# Patient Record
Sex: FEMALE | Race: WHITE | NOT HISPANIC OR LATINO | Employment: FULL TIME | URBAN - METROPOLITAN AREA
[De-identification: names, ages, dates, MRNs, and addresses within clinical notes are randomized per-mention and may not be internally consistent; named-entity substitution may affect disease eponyms.]

---

## 2017-06-03 ENCOUNTER — OFFICE VISIT (OUTPATIENT)
Dept: URGENT CARE | Facility: URGENT CARE | Age: 32
End: 2017-06-03
Payer: COMMERCIAL

## 2017-06-03 VITALS
SYSTOLIC BLOOD PRESSURE: 158 MMHG | DIASTOLIC BLOOD PRESSURE: 103 MMHG | HEART RATE: 69 BPM | WEIGHT: 268 LBS | OXYGEN SATURATION: 96 % | TEMPERATURE: 97.4 F | BODY MASS INDEX: 42.61 KG/M2

## 2017-06-03 DIAGNOSIS — M79.605 LOW BACK PAIN RADIATING TO BOTH LEGS: Primary | ICD-10-CM

## 2017-06-03 DIAGNOSIS — M62.830 BACK MUSCLE SPASM: ICD-10-CM

## 2017-06-03 DIAGNOSIS — M79.604 LOW BACK PAIN RADIATING TO BOTH LEGS: Primary | ICD-10-CM

## 2017-06-03 DIAGNOSIS — M54.50 LOW BACK PAIN RADIATING TO BOTH LEGS: Primary | ICD-10-CM

## 2017-06-03 PROCEDURE — 99213 OFFICE O/P EST LOW 20 MIN: CPT | Performed by: NURSE PRACTITIONER

## 2017-06-03 RX ORDER — CYCLOBENZAPRINE HCL 10 MG
5-10 TABLET ORAL 3 TIMES DAILY PRN
Qty: 30 TABLET | Refills: 0 | Status: SHIPPED | OUTPATIENT
Start: 2017-06-03 | End: 2017-06-06

## 2017-06-03 RX ORDER — HYDROCODONE BITARTRATE AND ACETAMINOPHEN 5; 325 MG/1; MG/1
1 TABLET ORAL EVERY 6 HOURS PRN
Qty: 12 TABLET | Refills: 0 | Status: SHIPPED | OUTPATIENT
Start: 2017-06-03 | End: 2017-06-06

## 2017-06-03 NOTE — MR AVS SNAPSHOT
After Visit Summary   6/3/2017    Malika So    MRN: [id number]           Patient Information     Date Of Birth          1985        Visit Information        Provider Department      6/3/2017 10:00 AM Leana Dent NP [facility]        Today's Diagnoses     Low back pain radiating to both legs    -  1    Back muscle spasm          Care Instructions      Back Care Tips    Caring for your back  These are things you can do to prevent a recurrence of acute back pain and to reduce symptoms from chronic back pain:    Maintain a healthy weight. If you are overweight, losing weight will help most types of back pain.    Exercise is an important part of recovery from most types of back pain. The back is supported by the muscles behind and in front of the spine. This means both the back muscles and the abdominal muscles must be strengthened to provide better support for your spine.     Swimming and brisk walking are good overall exercises to improve your fitness level.    Practice safe lifting methods (below).    Practice good posture when sitting, standing and walking. Avoid prolonged sitting. This puts more stress on the lower back than standing or walking.    Wear quality shoes with sufficient arch support. Foot and ankle alignment can affect back symptoms. Women should avoid high heels.    Therapeutic massage can help  relax the back muscles without stretching them.    During the first 24 to 72 hours after an acute injury or flare-up of chronic back pain, apply an ice pack to the painful area for 20 minutes and then remove it for 20 minutes over a period of 60 to 90 minutes or several times a day. As a safety precaution, do not use a heating pad at bedtime. Sleeping on a heating pad can lead to skin burns or tissue damage.    Ice and heat therapies can be alternated.  Medications  Talk to your doctor before using medications, especially if you have other medical problems  or are taking other medicines.    You may use acetaminophen or ibuprofen to control pain, unless other pain medicine was prescribed. If you have chronic conditions like diabetes, liver or kidney disease, stomach ulcers or gastrointestinal bleeding, or are taking blood thinners, talk with your doctor before taking any meidcations.    Be careful if you are given prescription pain medicines, narcotics, or medication for muscle spasm. They can cause drowsiness, affect your coordination, reflexes and judgment. Do not drive or operate heavy machinery.  Lumbar stretch  Here is a simple stretching exercise that will help relax muscle spasm and keep your back more limber. If exercise makes your back pain worse, don t do it.    Lie on your back with your knees bent and both feet on the ground.    Slowly raise your left knee to your chest as you flatten your lower back against the floor. Hold for 5 seconds.    Relax and repeat the exercise with your right knee.    Do 10 of these exercises for each leg.  Safe lifting method    Don t bend over at the waist to lift an object off the floor.  Instead, bend your knees and hips in a squat.     Keep your back and head upright    Hold the object close to your body, directly in front of you.    Straighten your legs to lift the object.     Lower the object to the floor in the reverse fashion.    If you must slide something across the floor, push it.  Posture tips  Sitting  Sit in chairs with straight backs or low-back support. rKeep your k nees lower than your hip, with your feet flat on the floor.  When driving, sit up straight. Adjust the seat forward so you are not leaning toward the steering wheel.  A small pillow or rolled towel behind your lower back may help if you are driving long distances.   Standing  When standing for long periods, shift most of your weight to one leg at a time. Alternate legs every few minutes.   Sleeping  The best way to sleep is on your side with your knees  bent. Put a low pillow under your head to support your neck in a neutral spine position. Avoid thick pillows that bend your neck to one side. Put a pillow between your legs to further relax your lower back. If you sleep on your back, put pillows under your knees to support your legs in a slightly flexed position. Use a firm mattress. If your mattress sags, replace it, or use a 1/2-inch plywood board under the mattress to add support.  Follow-up care  Follow up with your health care provider or as directed by our staff.  If X-rays, a CT scan or an MRI scan were taken, they will be reviewed by a radiologist. You will be notified of any new findings that may affect your care.  Call 911  Seek emergency medical care if any of the following occur:    Trouble breathing    Confusion    Very drowsy or trouble breathing    Fainting or loss of consciousness    Rapid or very slow heart rate    Loss of  bwel or bladder control  When to seek medical care  Call your health care provider if any of the following occur:    Pain becomes worse or spreads to your arms or legs    Weakness or numbness in one or both arms or legs    Numbness in the groin area    1728-0785 [employer]. [address] [place] [address]. All rights reserved. This information is not intended as a substitute for professional medical care. Always follow your healthcare professional's instructions.                Follow-ups after your visit        Who to contact     If you have questions or need follow up information about today's clinic visit or your schedule please contact [facility] directly at [phone].  Normal or non-critical lab and imaging results will be communicated to you by MyChart, letter or phone within 4 business days after the clinic has received the results. If you do not hear from us within 7 days, please contact the clinic through MyChart or phone. If you have a critical or abnormal lab result, we  will notify you by phone as soon as possible.  Submit refill requests through [employer] or call your pharmacy and they will forward the refill request to us. Please allow 3 business days for your refill to be completed.          Additional Information About Your Visit        [employer] Information     [employer] gives you secure access to your electronic health record. If you see a primary care provider, you can also send messages to your care team and make appointments. If you have questions, please call your primary care clinic.  If you do not have a primary care provider, please call [phone] and they will assist you.        Care EveryWhere ID     This is your Care EveryWhere ID. This could be used by other organizations to access your [place] medical records  [id number]        Your Vitals Were     Pulse Temperature Pulse Oximetry BMI (Body Mass Index)          69 97.4  F (36.3  C) (Oral) 96% 42.61 kg/m2         Blood Pressure from Last 3 Encounters:   06/03/17 (!) 158/103   05/21/16 118/82   05/18/16 124/75    Weight from Last 3 Encounters:   06/03/17 268 lb (121.6 kg)   05/21/16 233 lb (105.7 kg)   05/18/16 233 lb 9.6 oz (106 kg)              Today, you had the following     No orders found for display         Today's Medication Changes          These changes are accurate as of: 6/3/17 11:18 AM.  If you have any questions, ask your nurse or doctor.               Start taking these medicines.        Dose/Directions    cyclobenzaprine 10 MG tablet   Commonly known as:  FLEXERIL   Used for:  Back muscle spasm   Started by:  Leana Dent NP        Dose:  5-10 mg   Take 0.5-1 tablets (5-10 mg) by mouth 3 times daily as needed for muscle spasms   Quantity:  30 tablet   Refills:  0       HYDROcodone-acetaminophen 5-325 MG per tablet   Commonly known as:  NORCO   Used for:  Low back pain radiating to both legs   Started by:  Leana Dent NP        Dose:  1 tablet   Take 1 tablet by mouth every 6 hours as needed for  moderate to severe pain or pain maximum 4 tablet(s) per day   Quantity:  12 tablet   Refills:  0            Where to get your medicines      These [place] [facility] [place] [address]  [address], [facility] [address]     Phone:  [phone]     cyclobenzaprine 10 MG tablet         Some of these will need a paper prescription and others can be bought over the counter.  Ask your nurse if you have questions.     Bring a paper prescription for each of these medications     HYDROcodone-acetaminophen 5-325 MG per tablet                Primary Care Provider Office Phone # Fax #    Kelli Osullivan [phone]       XXX RESIGNED  [address]  [facility] [address]        Thank you!     Thank you for choosing [facility]  for your care. Our goal is always to provide you with excellent care. Hearing back from our patients is one way we can continue to improve our services. Please take a few minutes to complete the written survey that you may receive in the mail after your visit with us. Thank you!             Your Updated Medication List - Protect others around you: Learn how to safely use, store and throw away your medicines at www.disposemymeds.org.          This list is accurate as of: 6/3/17 11:18 AM.  Always use your most recent med list.                   Brand Name Dispense Instructions for use    clonazePAM 1 MG tablet    klonoPIN    30 tablet    Take 1 tablet up to 2 times daily as needed for anxiety - use sparingly       cyclobenzaprine 10 MG tablet    FLEXERIL    30 tablet    Take 0.5-1 tablets (5-10 mg) by mouth 3 times daily as needed for muscle spasms       HYDROcodone-acetaminophen 5-325 MG per tablet    NORCO    12 tablet    Take 1 tablet by mouth every 6 hours as needed for moderate to severe pain or pain maximum 4 tablet(s) per day       levonorgestrel 20 MCG/24HR IUD    MIRENA     1 each by  Intrauterine route once       sertraline 100 MG tablet    ZOLOFT    180 tablet    Take 2 tablets (200 mg) by mouth daily

## 2017-06-03 NOTE — PATIENT INSTRUCTIONS
Back Care Tips    Caring for your back  These are things you can do to prevent a recurrence of acute back pain and to reduce symptoms from chronic back pain:    Maintain a healthy weight. If you are overweight, losing weight will help most types of back pain.    Exercise is an important part of recovery from most types of back pain. The back is supported by the muscles behind and in front of the spine. This means both the back muscles and the abdominal muscles must be strengthened to provide better support for your spine.     Swimming and brisk walking are good overall exercises to improve your fitness level.    Practice safe lifting methods (below).    Practice good posture when sitting, standing and walking. Avoid prolonged sitting. This puts more stress on the lower back than standing or walking.    Wear quality shoes with sufficient arch support. Foot and ankle alignment can affect back symptoms. Women should avoid high heels.    Therapeutic massage can help  relax the back muscles without stretching them.    During the first 24 to 72 hours after an acute injury or flare-up of chronic back pain, apply an ice pack to the painful area for 20 minutes and then remove it for 20 minutes over a period of 60 to 90 minutes or several times a day. As a safety precaution, do not use a heating pad at bedtime. Sleeping on a heating pad can lead to skin burns or tissue damage.    Ice and heat therapies can be alternated.  Medications  Talk to your doctor before using medications, especially if you have other medical problems or are taking other medicines.    You may use acetaminophen or ibuprofen to control pain, unless other pain medicine was prescribed. If you have chronic conditions like diabetes, liver or kidney disease, stomach ulcers or gastrointestinal bleeding, or are taking blood thinners, talk with your doctor before taking any meidcations.    Be careful if you are given prescription pain medicines, narcotics, or  medication for muscle spasm. They can cause drowsiness, affect your coordination, reflexes and judgment. Do not drive or operate heavy machinery.  Lumbar stretch  Here is a simple stretching exercise that will help relax muscle spasm and keep your back more limber. If exercise makes your back pain worse, don t do it.    Lie on your back with your knees bent and both feet on the ground.    Slowly raise your left knee to your chest as you flatten your lower back against the floor. Hold for 5 seconds.    Relax and repeat the exercise with your right knee.    Do 10 of these exercises for each leg.  Safe lifting method    Don t bend over at the waist to lift an object off the floor.  Instead, bend your knees and hips in a squat.     Keep your back and head upright    Hold the object close to your body, directly in front of you.    Straighten your legs to lift the object.     Lower the object to the floor in the reverse fashion.    If you must slide something across the floor, push it.  Posture tips  Sitting  Sit in chairs with straight backs or low-back support. rKeep your k nees lower than your hip, with your feet flat on the floor.  When driving, sit up straight. Adjust the seat forward so you are not leaning toward the steering wheel.  A small pillow or rolled towel behind your lower back may help if you are driving long distances.   Standing  When standing for long periods, shift most of your weight to one leg at a time. Alternate legs every few minutes.   Sleeping  The best way to sleep is on your side with your knees bent. Put a low pillow under your head to support your neck in a neutral spine position. Avoid thick pillows that bend your neck to one side. Put a pillow between your legs to further relax your lower back. If you sleep on your back, put pillows under your knees to support your legs in a slightly flexed position. Use a firm mattress. If your mattress sags, replace it, or use a 1/2-inch plywood board  under the mattress to add support.  Follow-up care  Follow up with your health care provider or as directed by our staff.  If X-rays, a CT scan or an MRI scan were taken, they will be reviewed by a radiologist. You will be notified of any new findings that may affect your care.  Call 911  Seek emergency medical care if any of the following occur:    Trouble breathing    Confusion    Very drowsy or trouble breathing    Fainting or loss of consciousness    Rapid or very slow heart rate    Loss of  bwel or bladder control  When to seek medical care  Call your health care provider if any of the following occur:    Pain becomes worse or spreads to your arms or legs    Weakness or numbness in one or both arms or legs    Numbness in the groin area    [id number] [employer]. [address] [place] [address], [place] [address]. All rights reserved. This information is not intended as a substitute for professional medical care. Always follow your healthcare professional's instructions.

## 2017-06-03 NOTE — PROGRESS NOTES
SUBJECTIVE:                                                    Malika So is a 31 year old female who presents to clinic today for the following health issues:      Back Pain      Duration: yesterday        Specific cause: lifting    Description:   Location of pain: low back bilateral  Character of pain: sharp, stabbing, constant and intermittent  Pain radiation:none and radiates to both thighs and makes legs numb  New numbness or weakness in legs, not attributed to pain:  YES    Intensity: Currently 6/10    History:   Pain interferes with job: YES,   History of back problems: previous herniated disc  Any previous MRI or X-rays: Yes- at [place].  Date last year  Sees a specialist for back pain:  No  Therapies tried without relief:  Physical Therapy    Alleviating factors:   Improved by: chiropractor and massage      Precipitating factors:  Worsened by: Lifting, Bending, Sitting and Lying Flat    Functional and Psychosocial Screen (José Miguel STarT Back):      Not performed today       Accompanying Signs & Symptoms:  Risk of Fracture:  None  Risk of Cauda Equina:  None  Risk of Infection:  None  Risk of Cancer:  None  Risk of Ankylosing Spondylitis:  Onset at age <35, male, AND morning back stiffness. no                      Allergies   Allergen Reactions     Adhesive Tape      Fenugreek [Trigonella Foenum-Graecum] Hives     Ragweeds Hives       Past Medical History:   Diagnosis Date     Anxiety      Hypertension     prior to weight loss     Mild major depression (H)     celexa 100 mg      TB (pulmonary tuberculosis) 2003    neg chest x-ray. treated with INH for 9 moniths         Current Outpatient Prescriptions on File Prior to Visit:  sertraline (ZOLOFT) 100 MG tablet Take 2 tablets (200 mg) by mouth daily   levonorgestrel (MIRENA) 20 MCG/24HR IUD 1 each by Intrauterine route once   clonazePAM (KLONOPIN) 1 MG tablet Take 1 tablet up to 2 times daily as needed for anxiety - use sparingly (Patient not  taking: Reported on 6/3/2017)     No current facility-administered medications on file prior to visit.     Past Surgical History:   Procedure Laterality Date      SECTION N/A 2016    Procedure:  SECTION;  Surgeon: Kelli Osullivan MD;  Location:  [facility]     CHOLECYSTECTOMY, LAPOROSCOPIC       LAP ADJUSTABLE GASTRIC BAND      [facility] lost over 150 lb      LASIK  2012     TONSILLECTOMY  age 10       Social History     Social History     Marital status:      Spouse name: N/A     Number of children: N/A     Years of education: N/A     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[employer]     Social History Main Topics     Smoking status: Never Smoker     Smokeless tobacco: Never Used     Alcohol use No     Drug use: No     Sexual activity: Yes     Partners: Male     Birth control/ protection: Inserts      Comment: nuvaring     Other Topics Concern      Service No     Blood Transfusions No     Caffeine Concern No     Occupational Exposure Yes     Hobby Hazards No     Sleep Concern Yes     takes trazadone for sleep     Stress Concern Yes     Weight Concern Yes     Back Care No     Exercise Yes     3 times per week     Bike Helmet Yes     Seat Belt Yes     Self-Exams Yes     Parent/Sibling W/ Cabg, Mi Or Angioplasty Before 65f 55m? No     Social History Narrative    Caffeine intake/servings daily - 0    Calcium intake/servings daily - 3    Exercise 5 times weekly - describe ; walks    Sunscreen used - Yes    Seatbelts used - Yes    Guns stored in the home - No    Self Breast Exam - Yes    Pap test up to date -  Yes    Eye exam up to date -  Yes    Dental exam up to date -  Yes    DEXA scan up to date -  No    Flex Sig/Colonoscopy up to date -  No    Mammography up to date -  No    Immunizations reviewed and up to date - Yes    Abuse: Current or Past (Physical, Sexual or Emotional) - No    Do you feel safe in your environment - Yes    Do you cope well with stress - No    Do you suffer from  insomnia - No    Last updated by: Cindy James  6/4/2015                   REVIEW OF SYSTEMS  General: negative for fever  Resp: negative for chest pain   CV: negative for chest pain  : negative for dysuria , incontinence, frequency  Musculoskeletal: as above  Neurologic: negative for ataxia, saddle anesthesia, fecal incontinence, one sided weakness,  paresthesias    Physical Exam:  Vitals: BP (!) 158/103 (BP Location: Left arm, Patient Position: Other (Comments), Cuff Size: Adult Regular)  Pulse 69  Temp 97.4  F (36.3  C) (Oral)  Wt 268 lb (121.6 kg)  SpO2 96%  BMI 42.61 kg/m2  BMI= Body mass index is 42.61 kg/(m^2).  Constitutional: healthy, alert and no acute distress   CARDIO: RRR, no MRG  RESP: lungs CTA, NAD  NEURO: Patellar reflexes intact and equal b/l  BACK:  Unable to do straight leg raise due to pain. tender  paraspinal muscle TTP, strength, sensation and pulses  intact and equal b/l lower extremities  GAIT: intact  Psychiatric: mentation appears normal and affect normal/bright      Impression: Back pain, uncomplicated.     ICD-10-CM    1. Low back pain radiating to both legs M54.5 HYDROcodone-acetaminophen (NORCO) 5-325 MG per tablet   2. Back muscle spasm M62.830 cyclobenzaprine (FLEXERIL) 10 MG tablet       Plan:  Advised follow up with primary care, she is worried of worsening the disc bulge.  Instructions for back care and return precautions discussed.        Leana Dent  FNP-BC  Family Nurse Practitoner

## 2017-06-03 NOTE — NURSING NOTE
"Chief Complaint   Patient presents with     Back Pain     yesterday but hx of back issues       Initial BP (!) 158/103 (BP Location: Left arm, Patient Position: Other (Comments), Cuff Size: Adult Regular)  Pulse 69  Temp 97.4  F (36.3  C) (Oral)  Wt 268 lb (121.6 kg)  SpO2 96%  BMI 42.61 kg/m2 Estimated body mass index is 42.61 kg/(m^2) as calculated from the following:    Height as of 5/21/16: 5' 6.5\" (1.689 m).    Weight as of this encounter: 268 lb (121.6 kg).  Medication Reconciliation: complete    "

## 2017-06-05 NOTE — PROGRESS NOTES
SUBJECTIVE:     CC: Malika So is an 31 year old morbidly obese woman  who presents for preventive health visit.     She requests refills of medications for depression and anxiety. She also presents for follow-up of acute recurrent low back pain.     Healthy Habits:    Do you get at least three servings of calcium containing foods daily (dairy, green leafy vegetables, etc.)? yes    Amount of exercise or daily activities, outside of work: no    Problems taking medications regularly No    Medication side effects: No    Have you had an eye exam in the past two years? no    Do you see a dentist twice per year? no    Do you have sleep apnea, excessive snoring or daytime drowsiness?no      Today's PHQ-2 Score:   PHQ-2 (  [employer]) 2016   Q1: Little interest or pleasure in doing things 2 0   Q2: Feeling down, depressed or hopeless 2 0   PHQ-2 Score 4 0       Abuse: Current or Past(Physical, Sexual or Emotional)- No  Do you feel safe in your environment - Yes    Social History   Substance Use Topics     Smoking status: Never Smoker     Smokeless tobacco: Never Used     Alcohol use No     The patient does not drink >3 drinks per day nor >7 drinks per week.    Recent Labs   Lab Test  09   1308  09   1434   CHOL  184  120   HDL  40*  35*   LDL  127  71   TRIG  89  73   CHOLHDLRATIO  4.7  3.5       Reviewed orders with patient.  Reviewed health maintenance and updated orders accordingly - Yes    Mammo Decision Support:  Mammogram not appropriate for this patient based on age.    Pertinent mammograms are reviewed under the imaging tab.  History of abnormal Pap smear: NO - age 30-65 PAP every 5 years with negative HPV co-testing recommended    Reviewed and updated as needed this visit by clinical staff  Tobacco  Allergies  Meds  Problems  Med Hx  Surg Hx  Fam Hx  Soc Hx          Reviewed and updated as needed this visit by Provider  Tobacco  Allergies  Meds  Problems   "Med Hx  Surg Hx  Fam Hx  Soc Hx         Past Medical History:   Diagnosis Date     Anxiety      GERD without esophagitis      Hypertension     prior to weight loss     Latent tuberculosis     neg chest x-ray. treated with INH for 9 moniths     Mild major depression (H)     celexa 100 mg      Morbid obesity (H)       Past Surgical History:   Procedure Laterality Date      SECTION N/A 2016    Procedure:  SECTION;  Surgeon: Kelli Osullivan MD;  Location:  L+D     CHOLECYSTECTOMY, LAPOROSCOPIC       LAP ADJUSTABLE GASTRIC BAND      [facility] lost over 150 lb      LASIK       TONSILLECTOMY         ROS:  CONSTITUTIONAL:POSITIVE  for weight gain  I: NEGATIVE for worrisome rashes, moles or lesions  E: NEGATIVE for vision changes or irritation  ENT: NEGATIVE for ear, mouth and throat problems  R: NEGATIVE for significant cough or SOB  B: NEGATIVE for masses, tenderness or discharge  CV: NEGATIVE for chest pain, palpitations or peripheral edema  GI: GERD; NEGATIVE for nausea, abdominal pain, or change in bowel habits   female: menses: persistent break through bleeding on Mirena IUD   MUSCULOSKELETAL: recurrent low back pain   N: NEGATIVE for weakness, dizziness or paresthesias  E: NEGATIVE for temperature intolerance, skin/hair changes  H: NEGATIVE for bleeding problems  P: NEGATIVE for changes in mood or affect    Problem list, Medication list, Allergies, and Medical/Social/Surgical histories reviewed in [facility] and updated as appropriate.  OBJECTIVE:     /80  Pulse 93  Temp 98.6  F (37  C)  Resp 16  Ht 5' 10\" (1.778 m)  Wt 275 lb (124.7 kg)  SpO2 100%  BMI 39.46 kg/m2  EXAM:  GENERAL: alert, no distress and obese  EYES: Eyes grossly normal to inspection, PERRL and conjunctivae and sclerae normal  HENT: ear canals and TM's normal, nose and mouth without ulcers or lesions  NECK: no adenopathy, no asymmetry, masses, or scars and thyroid normal to palpation  RESP: lungs " clear to auscultation - no rales, rhonchi or wheezes  BREAST: normal without masses, tenderness or nipple discharge and no palpable axillary masses or adenopathy  CV: regular rate and rhythm, normal S1 S2, no S3 or S4, no murmur, click or rub, no peripheral edema and peripheral pulses strong  ABDOMEN: soft, nontender, no hepatosplenomegaly, no masses and bowel sounds normal  MS: no gross musculoskeletal defects noted, no edema  SKIN: no suspicious lesions or rashes  NEURO: Normal strength and tone, mentation intact and speech normal  PSYCH: mentation appears normal, affect normal/bright    ASSESSMENT/PLAN:     (Z00.00) Routine history and physical examination of adult  (primary encounter diagnosis)    (F33.0) Major depressive disorder, recurrent episode, mild (H)  (F41.9) Anxiety  Comment: Well controlled with medications without side effects.   Plan: sertraline (ZOLOFT) 100 MG tablet, clonazePAM         (KLONOPIN) 1 MG tablet        Discussed risks and benefits of this medication. No further refills until follow-up appointment     (E66.01) Morbid obesity due to excess calories (H)  Plan: Lipid panel reflex to direct LDL, TSH with free        T4 reflex, BARIATRIC ADULT REFERRAL          (M54.5) Non-specific low back pain  Plan: HYDROcodone-acetaminophen (NORCO) 5-325 MG per         tablet, cyclobenzaprine (FLEXERIL) 10 MG tablet        Discussed risks and benefits of this medication. No further refills until follow-up appointment. Over the counter pain medication as needed, ice or heat as she finds helpful, avoidance of aggravating activities, and return for persistence for consideration of further imaging or offered physical therapy referral.     (K21.9) GERD without esophagitis  Plan: continue over-the-counter medication as needed     (Z30.9) Encounter for contraceptive management, unspecified contraceptive encounter type  Plan: OB/GYN REFERRAL            COUNSELING:   Reviewed preventive health counseling, as  "reflected in patient instructions  Special attention given to:        Regular exercise       Healthy diet/nutrition       Contraception       Osteoporosis Prevention/Bone Health       The ASCVD Risk score ([place] Jr, et al., 2013) failed to calculate for the following reasons:    The 2013 ASCVD risk score is only valid for ages 40 to 79    BP Screening:   Last 3 BP Readings:    BP Readings from Last 3 Encounters:   06/06/17 136/80   06/03/17 (!) 158/103   05/21/16 118/82       The following was recommended to the patient:  Re-screen BP within a year and recommended lifestyle modifications     reports that she has never smoked. She has never used smokeless tobacco.    Estimated body mass index is 39.46 kg/(m^2) as calculated from the following:    Height as of this encounter: 5' 10\" (1.778 m).    Weight as of this encounter: 275 lb (124.7 kg).   Weight management plan: Patient referred to endocrine and/or weight management specialty    Counseling Resources:  ATP IV Guidelines  Pooled Cohorts Equation Calculator  Breast Cancer Risk Calculator  FRAX Risk Assessment  ICSI Preventive Guidelines  Dietary Guidelines for Americans, 2010  USDA's MyPlate  ASA Prophylaxis  Lung CA Screening    Josefina Welch MD  [facility]  "

## 2017-06-05 NOTE — PATIENT INSTRUCTIONS
Preventive Health Recommendations  Female Ages 26 - 39  Yearly exam:   See your health care provider every year in order to    Review health changes.     Discuss preventive care.      Review your medicines if you your doctor has prescribed any.    Until age 30: Get a Pap test every three years (more often if you have had an abnormal result).    After age 30: Talk to your doctor about whether you should have a Pap test every 3 years or have a Pap test with HPV screening every 5 years.   You do not need a Pap test if your uterus was removed (hysterectomy) and you have not had cancer.  You should be tested each year for STDs (sexually transmitted diseases), if you're at risk.   Talk to your provider about how often to have your cholesterol checked.  If you are at risk for diabetes, you should have a diabetes test (fasting glucose).  Shots: Get a flu shot each year. Get a tetanus shot every 10 years.   Nutrition:     Eat at least 5 servings of fruits and vegetables each day.    Eat whole-grain bread, whole-wheat pasta and brown rice instead of white grains and rice.    Talk to your provider about Calcium and Vitamin D.     Lifestyle    Exercise at least 150 minutes a week (30 minutes a day, 5 days of the week). This will help you control your weight and prevent disease.    Limit alcohol to one drink per day.    No smoking.     Wear sunscreen to prevent skin cancer.    See your dentist every six months for an exam and cleaning.    [facility]    If you have any questions regarding to your visit please contact your care team:       Team Red:   Clinic Hours Telephone Number   Dr. Josefina Steel NP   7am-7pm  Monday - Thursday   7am-5pm  Fridays  [phone]  (Appointment scheduling available 24/7)    Questions about your visit?   Team Line  [phone]   Urgent Care - [place] - 11am-9pm Monday-Friday Saturday-Sunday-  9am-5pm   [place] - 5pm-9pm Monday-Friday Saturday-Sunday- 9am-5pm  [phone] - [place]   [phone] - [place]       What options do I have for visits at the clinic other than the traditional office visit?  To expand how we care for you, many of our providers are utilizing electronic visits (e-visits) and telephone visits, when medically appropriate, for interactions with their patients rather than a visit in the clinic.   We also offer nurse visits for many medical concerns. Just like any other service, we will bill your insurance company for this type of visit based on time spent on the phone with your provider. Not all insurance companies cover these visits. Please check with your medical insurance if this type of visit is covered. You will be responsible for any charges that are not paid by your insurance.      E-visits via [employer]:  generally incur a $35.00 fee.  Telephone visits:  Time spent on the phone: *charged based on time that is spent on the phone in increments of 10 minutes. Estimated cost:   5-10 mins $30.00   11-20 mins. $59.00   21-30 mins. $85.00     Use [employer] (secure email communication and access to your chart) to send your primary care provider a message or make an appointment. Ask someone on your Team how to sign up for [employer].  For a Price Quote for your services, please call our Consumer Price Line at [phone].      As always, Thank you for trusting us with your health care needs!    Discharged by Saritha Avalos MA.

## 2017-06-06 ENCOUNTER — OFFICE VISIT (OUTPATIENT)
Dept: FAMILY MEDICINE | Facility: CLINIC | Age: 32
End: 2017-06-06
Payer: COMMERCIAL

## 2017-06-06 VITALS
SYSTOLIC BLOOD PRESSURE: 136 MMHG | DIASTOLIC BLOOD PRESSURE: 80 MMHG | HEIGHT: 70 IN | WEIGHT: 275 LBS | HEART RATE: 93 BPM | TEMPERATURE: 98.6 F | BODY MASS INDEX: 39.37 KG/M2 | RESPIRATION RATE: 16 BRPM | OXYGEN SATURATION: 100 %

## 2017-06-06 DIAGNOSIS — F33.0 MAJOR DEPRESSIVE DISORDER, RECURRENT EPISODE, MILD (H): ICD-10-CM

## 2017-06-06 DIAGNOSIS — Z00.00 ROUTINE HISTORY AND PHYSICAL EXAMINATION OF ADULT: Primary | ICD-10-CM

## 2017-06-06 DIAGNOSIS — Z30.9 ENCOUNTER FOR CONTRACEPTIVE MANAGEMENT, UNSPECIFIED CONTRACEPTIVE ENCOUNTER TYPE: ICD-10-CM

## 2017-06-06 DIAGNOSIS — F41.9 ANXIETY: ICD-10-CM

## 2017-06-06 DIAGNOSIS — E66.01 MORBID OBESITY DUE TO EXCESS CALORIES (H): ICD-10-CM

## 2017-06-06 DIAGNOSIS — K21.9 GERD WITHOUT ESOPHAGITIS: ICD-10-CM

## 2017-06-06 DIAGNOSIS — M54.50 NON-SPECIFIC LOW BACK PAIN: ICD-10-CM

## 2017-06-06 PROBLEM — Z98.84 BARIATRIC SURGERY STATUS: Status: ACTIVE | Noted: 2017-06-06

## 2017-06-06 PROCEDURE — 99395 PREV VISIT EST AGE 18-39: CPT | Performed by: FAMILY MEDICINE

## 2017-06-06 RX ORDER — CYCLOBENZAPRINE HCL 10 MG
5-10 TABLET ORAL 3 TIMES DAILY PRN
Qty: 30 TABLET | Refills: 5 | Status: SHIPPED | OUTPATIENT
Start: 2017-06-06 | End: 2018-03-23

## 2017-06-06 RX ORDER — HYDROCODONE BITARTRATE AND ACETAMINOPHEN 5; 325 MG/1; MG/1
1 TABLET ORAL EVERY 6 HOURS PRN
Qty: 12 TABLET | Refills: 0 | Status: SHIPPED | OUTPATIENT
Start: 2017-06-06 | End: 2018-03-23

## 2017-06-06 RX ORDER — CLONAZEPAM 1 MG/1
TABLET ORAL
Qty: 30 TABLET | Refills: 1 | Status: SHIPPED | OUTPATIENT
Start: 2017-06-06 | End: 2018-03-23

## 2017-06-06 RX ORDER — SERTRALINE HYDROCHLORIDE 100 MG/1
200 TABLET, FILM COATED ORAL DAILY
Qty: 180 TABLET | Refills: 1 | Status: SHIPPED | OUTPATIENT
Start: 2017-06-06 | End: 2018-05-23

## 2017-06-06 ASSESSMENT — ANXIETY QUESTIONNAIRES
5. BEING SO RESTLESS THAT IT IS HARD TO SIT STILL: NOT AT ALL
GAD7 TOTAL SCORE: 12
7. FEELING AFRAID AS IF SOMETHING AWFUL MIGHT HAPPEN: SEVERAL DAYS
6. BECOMING EASILY ANNOYED OR IRRITABLE: NEARLY EVERY DAY
3. WORRYING TOO MUCH ABOUT DIFFERENT THINGS: MORE THAN HALF THE DAYS
1. FEELING NERVOUS, ANXIOUS, OR ON EDGE: MORE THAN HALF THE DAYS
IF YOU CHECKED OFF ANY PROBLEMS ON THIS QUESTIONNAIRE, HOW DIFFICULT HAVE THESE PROBLEMS MADE IT FOR YOU TO DO YOUR WORK, TAKE CARE OF THINGS AT HOME, OR GET ALONG WITH OTHER PEOPLE: SOMEWHAT DIFFICULT
2. NOT BEING ABLE TO STOP OR CONTROL WORRYING: MORE THAN HALF THE DAYS

## 2017-06-06 ASSESSMENT — PATIENT HEALTH QUESTIONNAIRE - PHQ9: 5. POOR APPETITE OR OVEREATING: MORE THAN HALF THE DAYS

## 2017-06-06 NOTE — NURSING NOTE
"Chief Complaint   Patient presents with     Physical       Initial /80  Pulse 93  Temp 98.6  F (37  C)  Resp 16  Ht 5' 10\" (1.778 m)  Wt 275 lb (124.7 kg)  SpO2 100%  BMI 39.46 kg/m2 Estimated body mass index is 39.46 kg/(m^2) as calculated from the following:    Height as of this encounter: 5' 10\" (1.778 m).    Weight as of this encounter: 275 lb (124.7 kg).  Medication Reconciliation: complete     Estrada Leon. MA      "

## 2017-06-06 NOTE — LETTER
My Depression Action Plan  Name: Malika So   Date of Birth 1985  Date: 6/6/2017    My doctor: Josefina Welch   My clinic: [address]  [place] [address]  [phone]          GREEN    ZONE   Good Control    What it looks like:     Things are going generally well. You have normal up s and down s. You may even feel depressed from time to time, but bad moods usually last less than a day.   What you need to do:  1. Continue to care for yourself (see self care plan)  2. Check your depression survival kit and update it as needed  3. Follow your physician s recommendations including any medication.  4. Do not stop taking medication unless you consult with your physician first.           YELLOW         ZONE Getting Worse    What it looks like:     Depression is starting to interfere with your life.     It may be hard to get out of bed; you may be starting to isolate yourself from others.    Symptoms of depression are starting to last most all day and this has happened for several days.     You may have suicidal thoughts but they are not constant.   What you need to do:     1. Call your care team, your response to treatment will improve if you keep your care team informed of your progress. Yellow periods are signs an adjustment may need to be made.     2. Continue your self-care, even if you have to fake it!    3. Talk to someone in your support network    4. Open up your depression survival kit           RED    ZONE Medical Alert - Get Help    What it looks like:     Depression is seriously interfering with your life.     You may experience these or other symptoms: You can t get out of bed most days, can t work or engage in other necessary activities, you have trouble taking care of basic hygiene, or basic responsibilities, thoughts of suicide or death that will not go away, self-injurious behavior.     What you need to do:  1. Call your care team  and request a same-day appointment. If they are not available (weekends or after hours) call your local crisis line, emergency room or 911.      Electronically signed by: Josefina Welch, June 6, 2017    Depression Self Care Plan / Survival Kit    Self-Care for Depression  Here s the deal. Your body and mind are really not as separate as most people think.  What you do and think affects how you feel and how you feel influences what you do and think. This means if you do things that people who feel good do, it will help you feel better.  Sometimes this is all it takes.  There is also a place for medication and therapy depending on how severe your depression is, so be sure to consult with your medical provider and/ or Behavioral Health Consultant if your symptoms are worsening or not improving.     In order to better manage my stress, I will:    Exercise  Get some form of exercise, every day. This will help reduce pain and release endorphins, the  feel good  chemicals in your brain. This is almost as good as taking antidepressants!  This is not the same as joining a gym and then never going! (they count on that by the way ) It can be as simple as just going for a walk or doing some gardening, anything that will get you moving.      Hygiene   Maintain good hygiene (Get out of bed in the morning, Make your bed, Brush your teeth, Take a shower, and Get dressed like you were going to work, even if you are unemployed).  If your clothes don't fit try to get ones that do.    Diet  I will strive to eat foods that are good for me, drink plenty of water, and avoid excessive sugar, caffeine, alcohol, and other mood-altering substances.  Some foods that are helpful in depression are: complex carbohydrates, B vitamins, flaxseed, fish or fish oil, fresh fruits and vegetables.    Psychotherapy  I agree to participate in Individual Therapy (if recommended).    Medication  If prescribed medications, I agree to take them.  Missing doses  can result in serious side effects.  I understand that drinking alcohol, or other illicit drug use, may cause potential side effects.  I will not stop my medication abruptly without first discussing it with my provider.    Staying Connected With Others  I will stay in touch with my friends, family members, and my primary care provider/team.    Use your imagination  Be creative.  We all have a creative side; it doesn t matter if it s oil painting, sand castles, or mud pies! This will also kick up the endorphins.    Witness Beauty  (AKA stop and smell the roses) Take a look outside, even in mid-winter. Notice colors, textures. Watch the squirrels and birds.     Service to others  Be of service to others.  There is always someone else in need.  By helping others we can  get out of ourselves  and remember the really important things.  This also provides opportunities for practicing all the other parts of the program.    Humor  Laugh and be silly!  Adjust your TV habits for less news and crime-drama and more comedy.    Control your stress  Try breathing deep, massage therapy, biofeedback, and meditation. Find time to relax each day.     My support system    Clinic Contact:  Phone number:    Contact 1:  Phone number:    Contact 2:  Phone number:    [employer]/:  Phone number:    Therapist:  Phone number:    Local crisis center:    Phone number:    Other community support:  Phone number:

## 2017-06-06 NOTE — MR AVS SNAPSHOT
After Visit Summary   6/6/2017    Malika So    MRN: [id number]           Patient Information     Date Of Birth          1985        Visit Information        Provider Department      6/6/2017 6:40 PM Josefina Welch MD [facility]        Today's Diagnoses     Routine history and physical examination of adult    -  1    Major depressive disorder, recurrent episode, mild (H)        Anxiety        Morbid obesity due to excess calories (H)        Non-specific low back pain        GERD without esophagitis        Encounter for contraceptive management, unspecified contraceptive encounter type          Care Instructions    Preventive Health Recommendations  Female Ages 26 - 39  Yearly exam:   See your health care provider every year in order to    Review health changes.     Discuss preventive care.      Review your medicines if you your doctor has prescribed any.    Until age 30: Get a Pap test every three years (more often if you have had an abnormal result).    After age 30: Talk to your doctor about whether you should have a Pap test every 3 years or have a Pap test with HPV screening every 5 years.   You do not need a Pap test if your uterus was removed (hysterectomy) and you have not had cancer.  You should be tested each year for STDs (sexually transmitted diseases), if you're at risk.   Talk to your provider about how often to have your cholesterol checked.  If you are at risk for diabetes, you should have a diabetes test (fasting glucose).  Shots: Get a flu shot each year. Get a tetanus shot every 10 years.   Nutrition:     Eat at least 5 servings of fruits and vegetables each day.    Eat whole-grain bread, whole-wheat pasta and brown rice instead of white grains and rice.    Talk to your provider about Calcium and Vitamin D.     Lifestyle    Exercise at least 150 minutes a week (30 minutes a day, 5 days of the week). This will help you control your weight and prevent  disease.    Limit alcohol to one drink per day.    No smoking.     Wear sunscreen to prevent skin cancer.    See your dentist every six months for an exam and cleaning.    [facility]    If you have any questions regarding to your visit please contact your care team:       Team Red:   Clinic Hours Telephone Number   Dr. Josefina Steel, NP   7am-7pm  Monday - Thursday   7am-5pm  Fridays  [phone]  (Appointment scheduling available 24/7)    Questions about your visit?   Team Line  [phone]   Urgent Care - [place] - 11am-9pm Monday-Friday Saturday-Sunday- 9am-5pm   [place] - 5pm-9pm Monday-Friday Saturday-Sunday- 9am-5pm  [phone] - [place]   [phone] - [place]       What options do I have for visits at the clinic other than the traditional office visit?  To expand how we care for you, many of our providers are utilizing electronic visits (e-visits) and telephone visits, when medically appropriate, for interactions with their patients rather than a visit in the clinic.   We also offer nurse visits for many medical concerns. Just like any other service, we will bill your insurance company for this type of visit based on time spent on the phone with your provider. Not all insurance companies cover these visits. Please check with your medical insurance if this type of visit is covered. You will be responsible for any charges that are not paid by your insurance.      E-visits via [employer]:  generally incur a $35.00 fee.  Telephone visits:  Time spent on the phone: *charged based on time that is spent on the phone in increments of 10 minutes. Estimated cost:   5-10 mins $30.00   11-20 mins. $59.00   21-30 mins. $85.00     Use [employer] (secure email communication and access to your chart) to send your primary care provider a message or make an appointment. Ask someone on your Team how to sign up for  Trevon.  For a Price Quote for your services, please call our Consumer Price Line at [phone].      As always, Thank you for trusting us with your health care needs!    Discharged by Saritha Avalos MA.            Follow-ups after your visit        Additional Services     BARIATRIC ADULT REFERRAL       Your provider has referred you to: [facility]: [facility] [phone]   [email or web address]    Please be aware that coverage of these services is subject to the terms and limitations of your health insurance plan.  Call member services at your health plan with any benefit or coverage questions.      Please bring the following with you to your appointment:      (1) List of current medications   (2) This referral request   (3) Any documents/labs given to you for this referral            OB/GYN REFERRAL       Your provider has referred you to:  [facility]: [facility] [phone]   [email or web address]    Please be aware that coverage of these services is subject to the terms and limitations of your health insurance plan.  Call member services at your health plan with any benefit or coverage questions.      Please bring the following with you to your appointment:    (1) Any X-Rays, CTs or MRIs which have been performed.  Contact the facility where they were done to arrange for  prior to your scheduled appointment.   (2) List of current medications   (3) This referral request   (4) Any documents/labs given to you for this referral                  Follow-up notes from your care team     Return in about 1 year (around 6/6/2018) for physical.      Future tests that were ordered for you today     Open Future Orders        Priority Expected Expires Ordered    Lipid panel reflex to direct LDL Routine 7/18/2017 6/6/2018 6/6/2017    TSH with free T4 reflex Routine  6/6/2018 6/6/2017  "           Who to contact     If you have questions or need follow up information about today's clinic visit or your schedule please contact [facility] directly at [phone].  Normal or non-critical lab and imaging results will be communicated to you by MyChart, letter or phone within 4 business days after the clinic has received the results. If you do not hear from us within 7 days, please contact the clinic through [employer] or phone. If you have a critical or abnormal lab result, we will notify you by phone as soon as possible.  Submit refill requests through [employer] or call your pharmacy and they will forward the refill request to us. Please allow 3 business days for your refill to be completed.          Additional Information About Your Visit        [employer] Information     [employer] gives you secure access to your electronic health record. If you see a primary care provider, you can also send messages to your care team and make appointments. If you have questions, please call your primary care clinic.  If you do not have a primary care provider, please call [phone] and they will assist you.        Care EveryWhere ID     This is your Care EveryWhere ID. This could be used by other organizations to access your [place] medical records  [id number]        Your Vitals Were     Pulse Temperature Respirations Height Pulse Oximetry BMI (Body Mass Index)    93 98.6  F (37  C) 16 5' 10\" (1.778 m) 100% 39.46 kg/m2       Blood Pressure from Last 3 Encounters:   06/06/17 136/80   06/03/17 (!) 158/103   05/21/16 118/82    Weight from Last 3 Encounters:   06/06/17 275 lb (124.7 kg)   06/03/17 268 lb (121.6 kg)   05/21/16 233 lb (105.7 kg)              We Performed the Following     BARIATRIC ADULT REFERRAL     DEPRESSION ACTION PLAN (DAP)     OB/GYN REFERRAL          Today's Medication Changes          These changes are accurate as of: 6/6/17  7:21 PM.  If you have any questions, ask your nurse or doctor. "               These medicines have changed or have updated prescriptions.        Dose/Directions    HYDROcodone-acetaminophen 5-325 MG per tablet   Commonly known as:  NORCO   This may have changed:  additional instructions   Used for:  Non-specific low back pain   Changed by:  Josefina Welch MD        Dose:  1 tablet   Take 1 tablet by mouth every 6 hours as needed for moderate to severe pain or pain . No further refills until follow-up appointment   Quantity:  12 tablet   Refills:  0            Where to get your medicines      These medications were sent to [facility] - [address] [facility] [place]  [address] [facility] [place] [address]     Phone:  [phone]     cyclobenzaprine 10 MG tablet    sertraline 100 MG tablet         Some of these will need a paper prescription and others can be bought over the counter.  Ask your nurse if you have questions.     Bring a paper prescription for each of these medications     clonazePAM 1 MG tablet    HYDROcodone-acetaminophen 5-325 MG per tablet                Primary Care Provider Office Phone # Fax #    Josefina Welch -[phone]       [address] [address]        Thank you!     Thank you for choosing [facility]  for your care. Our goal is always to provide you with excellent care. Hearing back from our patients is one way we can continue to improve our services. Please take a few minutes to complete the written survey that you may receive in the mail after your visit with us. Thank you!             Your Updated Medication List - Protect others around you: Learn how to safely use, store and throw away your medicines at www.disposemymeds.org.          This list is accurate as of: 6/6/17  7:21 PM.  Always use your most recent med list.                   Brand Name Dispense Instructions for use    clonazePAM 1 MG tablet    klonoPIN    30 tablet    Take 1 tablet up to 2  times daily as needed for anxiety - use sparingly       cyclobenzaprine 10 MG tablet    FLEXERIL    30 tablet    Take 0.5-1 tablets (5-10 mg) by mouth 3 times daily as needed for muscle spasms       HYDROcodone-acetaminophen 5-325 MG per tablet    NORCO    12 tablet    Take 1 tablet by mouth every 6 hours as needed for moderate to severe pain or pain . No further refills until follow-up appointment       levonorgestrel 20 MCG/24HR IUD    MIRENA     1 each by Intrauterine route once       ranitidine 150 MG tablet    ZANTAC     Take 1 tablet (150 mg) by mouth At Bedtime       sertraline 100 MG tablet    ZOLOFT    180 tablet    Take 2 tablets (200 mg) by mouth daily

## 2017-06-07 ASSESSMENT — PATIENT HEALTH QUESTIONNAIRE - PHQ9: SUM OF ALL RESPONSES TO PHQ QUESTIONS 1-9: 19

## 2017-06-07 ASSESSMENT — ANXIETY QUESTIONNAIRES: GAD7 TOTAL SCORE: 12

## 2017-11-20 ENCOUNTER — VIRTUAL VISIT (OUTPATIENT)
Dept: FAMILY MEDICINE | Facility: OTHER | Age: 32
End: 2017-11-20

## 2017-11-20 NOTE — PROGRESS NOTES
"Date:   Clinician: Danya Pak  Clinician NPI: [id number]  Patient: Malika So  Patient : 1985  Patient Address: [address] [place] [address]  Patient Phone: [phone]  Visit Protocol: Eye conditions  Patient Summary:  Malika is a 32 year old (: 1985 ) female who initiated a Visit for conjunctivitis.  When asked the question \"Please sign me up to receive news, health information and promotions from [employer].\", Malika responded \"Yes\".   A synchronous visit is necessary because the patient reported the following abnormal symptoms:   Sensitivity to light (requires clarification)   Images of her eye condition were uploaded.   Her symptoms started 1-2 days ago and affect both eyes. The symptoms consist of light sensitivity, itchy eye(s), eyelid swelling, eye pain, drainage coming from the eye(s), and eye redness. Additionally, her vision has become more blurry since her symptoms started, but it's possible the blurry vision is caused by the eyelid swelling and/or drainage coming from the eye(s).   Symptom details     Drainage: The color of the drainage coming out of her eye(s) is green. The drainage is thick and causes her eyelids to be stuck shut in the morning.    Itchiness: Malika does not have seasonal allergies or hay fever.    Eye pain: She is experiencing mild eye pain. She has not taken over-the-counter medication for the pain.     Denied symptoms include bumps on the eyelid. Malika does not have subconjunctival hemorrhage. She does not feel feverish.   In the past 2 weeks, she has had a cold or an ear infection.   Malika has not had a recent diagnosis of conjunctivitis. She also has not had a recent eye injury, foreign body in the eye(s), and eye surgery. It is not known if Malika has recently been exposed to someone with a red eye or an eye infection. She does not wear contact lenses. Malika has not ever been diagnosed with glaucoma.   Malika is not taking medication " to treat her current symptoms.   Malika does not require proof of evaluation of her eye condition before returning to school, work, or .   Malika does not smoke or use smokeless tobacco.   She denies pregnancy and denies breastfeeding. She is currently menstruating.   MEDICATIONS:  No current medications  , ALLERGIES:  NKDA   Clinician Response:  Dear Malika,  I am sorry you are not feeling well. Your health is our priority. To determine the most appropriate care for you, I would like you to be seen in person to further discuss your health history and symptoms.  You will not be charged for this Visit. Thank you for trusting us with your care.   Diagnosis: Refer for additional evaluation  Diagnosis ICD: R69  Triage Notes: spoke with pt. She spelled her last name and verified . She has left eye pain and mod light sensitivity. She needs clinical eye exam. On photos there may be some circumcorneal injection and she needs exam to R/O iritis or other eye problem.  Synchronous Triage: phone, status: completed, duration: 154 seconds

## 2017-11-21 ENCOUNTER — OFFICE VISIT (OUTPATIENT)
Dept: FAMILY MEDICINE | Facility: CLINIC | Age: 32
End: 2017-11-21
Payer: COMMERCIAL

## 2017-11-21 VITALS
DIASTOLIC BLOOD PRESSURE: 83 MMHG | TEMPERATURE: 96 F | OXYGEN SATURATION: 100 % | HEART RATE: 74 BPM | WEIGHT: 284.4 LBS | SYSTOLIC BLOOD PRESSURE: 131 MMHG | BODY MASS INDEX: 40.81 KG/M2

## 2017-11-21 DIAGNOSIS — H10.9 BACTERIAL CONJUNCTIVITIS OF LEFT EYE: ICD-10-CM

## 2017-11-21 DIAGNOSIS — L03.213 PRESEPTAL CELLULITIS OF LEFT EYE: Primary | ICD-10-CM

## 2017-11-21 PROCEDURE — 99213 OFFICE O/P EST LOW 20 MIN: CPT | Performed by: NURSE PRACTITIONER

## 2017-11-21 RX ORDER — POLYMYXIN B SULFATE AND TRIMETHOPRIM 1; 10000 MG/ML; [USP'U]/ML
1 SOLUTION OPHTHALMIC 4 TIMES DAILY
Qty: 2 ML | Refills: 0 | Status: SHIPPED | OUTPATIENT
Start: 2017-11-21 | End: 2017-11-28

## 2017-11-21 RX ORDER — CEPHALEXIN 500 MG/1
500 CAPSULE ORAL 2 TIMES DAILY
Qty: 20 CAPSULE | Refills: 0 | Status: SHIPPED | OUTPATIENT
Start: 2017-11-21 | End: 2018-03-23

## 2017-11-21 NOTE — NURSING NOTE
"Chief Complaint   Patient presents with     Conjunctivitis       Initial /83 (BP Location: Left arm, Patient Position: Sitting, Cuff Size: Adult Large)  Pulse 74  Temp 96  F (35.6  C) (Tympanic)  Wt 284 lb 6.4 oz (129 kg)  SpO2 100%  Breastfeeding? No  BMI 40.81 kg/m2 Estimated body mass index is 40.81 kg/(m^2) as calculated from the following:    Height as of 6/6/17: 5' 10\" (1.778 m).    Weight as of this encounter: 284 lb 6.4 oz (129 kg).  Medication Reconciliation: complete   Kang GARZA      "

## 2017-11-21 NOTE — MR AVS SNAPSHOT
After Visit Summary   11/21/2017    Malika So    MRN: [id number]           Patient Information     Date Of Birth          1985        Visit Information        Provider Department      11/21/2017 9:00 AM Odessa Martinez APRN CNP [facility]        Today's Diagnoses     Allergic conjunctivitis, left    -  1      Care Instructions    At [facility], we strive to deliver an exceptional experience to you, every time we see you.  If you receive a survey in the mail, please send us back your thoughts. We really do value your feedback.    Based on your medical history, these are the current health maintenance/preventive care services that you are due for (some may have been done at this visit.)  Health Maintenance Due   Topic Date Due     LIPID MONITORING Q5 YEARS  11/18/2014     INFLUENZA VACCINE (SYSTEM ASSIGNED)  09/01/2017     PHQ-9 Q6 MONTHS  12/06/2017         Suggested websites for health information:  Www.[employer] : Up to date and easily searchable information on multiple topics.  Www.medlineplus.gov : medication info, interactive tutorials, watch real surgeries online  Www.familydoctor.org : good info from the Academy of Family Physicians  Www.cdc.gov : public health info, travel advisories, epidemics (H1N1)  Www.aap.org : children's health info, normal development, vaccinations  Www.health.state.[place].us : [place] dept of health, public health issues in [place], N1N1    Your care team:                            Family Medicine Internal Medicine   MD Keyon Wolfe MD Shantel Branch-Fleming, MD Katya Georgiev PA-C Nam Ho, MD Pediatrics   ANA Gonzalez CNP Amelia Massimini APRN CNP Shaista Malik, MD Bethany Templen, MD Deborah Mielke, MD Kim Thein, APRN CNP      Clinic hours: Monday - Thursday 7 am-7 pm; Fridays 7 am-5 pm.   Urgent care: Monday - Friday 11 am-9 pm; Saturday and Sunday 9 am-5  "pm.  Pharmacy : Monday -Thursday 8 am-8 pm; Friday 8 am-6 pm; Saturday and  9 am-5 pm.     Clinic: [phone]   Pharmacy: [phone]            Follow-ups after your visit        Who to contact     If you have questions or need follow up information about today's clinic visit or your schedule please contact [facility] directly at [phone].  Normal or non-critical lab and imaging results will be communicated to you by [employer], letter or phone within 4 business days after the clinic has received the results. If you do not hear from us within 7 days, please contact the clinic through [employer] or phone. If you have a critical or abnormal lab result, we will notify you by phone as soon as possible.  Submit refill requests through [employer] or call your pharmacy and they will forward the refill request to us. Please allow 3 business days for your refill to be completed.          Additional Information About Your Visit        [employer] Information     [employer]per lets you send messages to your doctor, view your test results, renew your prescriptions, schedule appointments and more. To sign up, go to [email or web address] . Click on \"Log in\" on the left side of the screen, which will take you to the Welcome page. Then click on \"Sign up Now\" on the right side of the page.     You will be asked to enter the access code listed below, as well as some personal information. Please follow the directions to create your username and password.     Your access code is: [email or web address]  Expires: 2018  9:14 AM     Your access code will  in 90 days. If you need help or a new code, please call your [place] clinic or [phone].        Care EveryWhere ID     This is your Care EveryWhere ID. This could be used by other organizations to access your [place] medical records  [id number]        Your Vitals Were     Pulse Temperature Pulse Oximetry Breastfeeding? BMI (Body Mass Index)       74 96  F " (35.6  C) (Tympanic) 100% No 40.81 kg/m2        Blood Pressure from Last 3 Encounters:   11/21/17 131/83   06/06/17 136/80   06/03/17 (!) 158/103    Weight from Last 3 Encounters:   11/21/17 284 lb 6.4 oz (129 kg)   06/06/17 275 lb (124.7 kg)   06/03/17 268 lb (121.6 kg)              Today, you had the following     No orders found for display         Today's Medication Changes          These changes are accurate as of: 11/21/17  9:14 AM.  If you have any questions, ask your nurse or doctor.               Start taking these medicines.        Dose/Directions    cephALEXin 500 MG capsule   Commonly known as:  KEFLEX   Used for:  Allergic conjunctivitis, left   Started by:  Odessa Martinez APRN CNP        Dose:  500 mg   Take 1 capsule (500 mg) by mouth 2 times daily   Quantity:  20 capsule   Refills:  0       trimethoprim-polymyxin b ophthalmic solution   Commonly known as:  POLYTRIM   Used for:  Allergic conjunctivitis, left   Started by:  Odessa Martinez APRN CNP        Dose:  1 drop   Apply 1 drop to eye 4 times daily for 7 days   Quantity:  2 mL   Refills:  0            Where to get your medicines      These medications were sent to [facility] - [place], [place] - [address]  [address], [facility] [address]     Phone:  [phone]     cephALEXin 500 MG capsule    trimethoprim-polymyxin b ophthalmic solution                Primary Care Provider Office Phone # Fax #    Josefina Welch [phone]       [address] [place] [address]  [facility] [address]        Equal Access to Services     [facility] AH: Hadii lida rivero Soalyssa, waaxda luqadaha, qaybta kaalmada isabel, brigitte preston. So [facility] [phone].    ATENCIÓN: Si habla español, tiene a rowe disposición servicios gratuitos de asistencia lingüística. Cara gary [phone].    We comply with applicable federal civil rights laws and [place] laws. We do not  discriminate on the basis of race, color, national origin, age, disability, sex, sexual orientation, or gender identity.            Thank you!     Thank you for choosing [facility]  for your care. Our goal is always to provide you with excellent care. Hearing back from our patients is one way we can continue to improve our services. Please take a few minutes to complete the written survey that you may receive in the mail after your visit with us. Thank you!             Your Updated Medication List - Protect others around you: Learn how to safely use, store and throw away your medicines at www.disposemymeds.org.          This list is accurate as of: 11/21/17  9:14 AM.  Always use your most recent med list.                   Brand Name Dispense Instructions for use Diagnosis    cephALEXin 500 MG capsule    KEFLEX    20 capsule    Take 1 capsule (500 mg) by mouth 2 times daily    Allergic conjunctivitis, left       clonazePAM 1 MG tablet    klonoPIN    30 tablet    Take 1 tablet up to 2 times daily as needed for anxiety - use sparingly    Anxiety       cyclobenzaprine 10 MG tablet    FLEXERIL    30 tablet    Take 0.5-1 tablets (5-10 mg) by mouth 3 times daily as needed for muscle spasms    Non-specific low back pain       HYDROcodone-acetaminophen 5-325 MG per tablet    NORCO    12 tablet    Take 1 tablet by mouth every 6 hours as needed for moderate to severe pain or pain . No further refills until follow-up appointment    Non-specific low back pain       levonorgestrel 20 MCG/24HR IUD    MIRENA     1 each by Intrauterine route once        ranitidine 150 MG tablet    ZANTAC     Take 1 tablet (150 mg) by mouth At Bedtime        sertraline 100 MG tablet    ZOLOFT    180 tablet    Take 2 tablets (200 mg) by mouth daily    Anxiety       trimethoprim-polymyxin b ophthalmic solution    POLYTRIM    2 mL    Apply 1 drop to eye 4 times daily for 7 days    Allergic conjunctivitis, left

## 2017-11-21 NOTE — PROGRESS NOTES
SUBJECTIVE:   Malika So is a 32 year old female who presents to clinic today for the following health issues:        Eye(s) Problem      Duration:     Description:  Location: left  Pain: YES  Redness: YES  Discharge: YES    Accompanying signs and symptoms: None    History (Trauma, foreign body exposure,): None    Precipitating or alleviating factors (contact use): None    Therapies tried and outcome: None  Photosensitive, some pain with movement of eye but hurts more on eyelid, denies deep pain in eye.  Denies vision changes once mattering is cleared she sees well.  Did have a viral upper respiratory tract infection last week and kids have all had colds.  No one else with pink eye.  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  Additional history: as documented    Patient Active Problem List   Diagnosis     Mild major depression (H)     Anxiety     Non-specific low back pain     Morbid obesity (H)     Bariatric surgery status     GERD without esophagitis     Past Surgical History:   Procedure Laterality Date      SECTION N/A 2016    Procedure:  SECTION;  Surgeon: Kelli Osullivan MD;  Location: [facility] L[facility]     CHOLECYSTECTOMY, LAPOROSCOPIC       LAP ADJUSTABLE GASTRIC BAND      [facility] lost over 150 lb      LASIK       TONSILLECTOMY         Social History   Substance Use Topics     Smoking status: Never Smoker     Smokeless tobacco: Never Used     Alcohol use No     Family History   Problem Relation Age of Onset     C.A.D. Maternal Grandfather      DIABETES Maternal Grandfather      Heart Failure Paternal Grandmother      CHF     Anemia Paternal Grandmother      Obesity Other      Had GI surgery     Hypertension Other      mother's side of family     Arthritis Other      both sides of family         Current Outpatient Prescriptions   Medication Sig Dispense Refill     cephALEXin (KEFLEX) 500 MG capsule Take 1  capsule (500 mg) by mouth 2 times daily 20 capsule 0     trimethoprim-polymyxin b (POLYTRIM) ophthalmic solution Apply 1 drop to eye 4 times daily for 7 days 2 mL 0     HYDROcodone-acetaminophen (NORCO) 5-325 MG per tablet Take 1 tablet by mouth every 6 hours as needed for moderate to severe pain or pain . No further refills until follow-up appointment 12 tablet 0     cyclobenzaprine (FLEXERIL) 10 MG tablet Take 0.5-1 tablets (5-10 mg) by mouth 3 times daily as needed for muscle spasms 30 tablet 5     sertraline (ZOLOFT) 100 MG tablet Take 2 tablets (200 mg) by mouth daily 180 tablet 1     clonazePAM (KLONOPIN) 1 MG tablet Take 1 tablet up to 2 times daily as needed for anxiety - use sparingly 30 tablet 1     ranitidine (ZANTAC) 150 MG tablet Take 1 tablet (150 mg) by mouth At Bedtime       levonorgestrel (MIRENA) 20 MCG/24HR IUD 1 each by Intrauterine route once       Allergies   Allergen Reactions     Adhesive Tape      Fenugreek [Trigonella Foenum-Graecum] Hives     Ragweeds Hives         Reviewed and updated as needed this visit by clinical staff     Reviewed and updated as needed this visit by Provider         ROS:  C: NEGATIVE for fever, chills, change in weight  INTEGUMENTARY/SKIN: NEGATIVE for worrisome rashes, moles or lesions  EYES: POSITIVE for discharge left, eye pain left, mattering left, photophobia, redness left and tearing left and NEGATIVE for blurred vision   E/M: NEGATIVE for ear, mouth and throat problems  R: NEGATIVE for significant cough or SOB  CV: NEGATIVE for chest pain, palpitations or peripheral edema  GI: NEGATIVE for nausea, abdominal pain, heartburn, or change in bowel habits    OBJECTIVE:     /83 (BP Location: Left arm, Patient Position: Sitting, Cuff Size: Adult Large)  Pulse 74  Temp 96  F (35.6  C) (Tympanic)  Wt 284 lb 6.4 oz (129 kg)  SpO2 100%  Breastfeeding? No  BMI 40.81 kg/m2  Body mass index is 40.81 kg/(m^2).  GENERAL: healthy, alert and no distress  EYES: PERRL,  EOMI, visual fields normal, eyelids- left: swelling throughout, redness, conjunctiva/corneas- conjunctival injection left eye and pupils- equal and reactive to light  HENT: ear canals and TM's normal, nose and mouth without ulcers or lesions  NECK: no adenopathy, no asymmetry, masses, or scars and thyroid normal to palpation  RESP: lungs clear to auscultation - no rales, rhonchi or wheezes  CV: regular rate and rhythm, normal S1 S2, no S3 or S4, no murmur, click or rub, no peripheral edema and peripheral pulses strong  MS: no gross musculoskeletal defects noted, no edema    Diagnostic Test Results:  none     ASSESSMENT/PLAN:     1. Preseptal cellulitis of left eye  Will treat with keflex x 10 days.  No suspicion for bony involvement.  Warning signs and return precautions given.    2. Bacterial conjunctivitis of left eye  Polytrim prescribed.        Patient Instructions     At [facility], we strive to deliver an exceptional experience to you, every time we see you.  If you receive a survey in the mail, please send us back your thoughts. We really do value your feedback.    Based on your medical history, these are the current health maintenance/preventive care services that you are due for (some may have been done at this visit.)  Health Maintenance Due   Topic Date Due     LIPID MONITORING Q5 YEARS  11/18/2014     INFLUENZA VACCINE (SYSTEM ASSIGNED)  09/01/2017     PHQ-9 Q6 MONTHS  12/06/2017         Suggested websites for health information:  Www.[employer] : Up to date and easily searchable information on multiple topics.  Www.medlineplus.gov : medication info, interactive tutorials, watch real surgeries online  Www.familydoctor.org : good info from the Academy of Family Physicians  Www.cdc.gov : public health info, travel advisories, epidemics (H1N1)  Www.aap.org : children's health info, normal development, vaccinations  Www.health.[facility].[place].us : [place] dept of health, public health issues in  [place], N1N1    Your care team:                            Family Medicine Internal Medicine   MD Keyon Wolfe MD Shantel Branch-Fleming, MD Katya Georgiev PA-C Nam Ho, MD Pediatrics   ANA Gonzalez, MD Karina Pang CNP, MD Deborah Mielke, MD Kim Thein, APRN CNP      Clinic hours: Monday - Thursday 7 am-7 pm; Fridays 7 am-5 pm.   Urgent care: Monday - Friday 11 am-9 pm; Saturday and Sunday 9 am-5 pm.  Pharmacy : Monday -Thursday 8 am-8 pm; Friday 8 am-6 pm; Saturday and Sunday 9 am-5 pm.     Clinic: [phone]   Pharmacy: [phone]        MATT Gonsales CNP  [facility]

## 2017-11-21 NOTE — PATIENT INSTRUCTIONS
At [facility], we strive to deliver an exceptional experience to you, every time we see you.  If you receive a survey in the mail, please send us back your thoughts. We really do value your feedback.    Based on your medical history, these are the current health maintenance/preventive care services that you are due for (some may have been done at this visit.)  Health Maintenance Due   Topic Date Due     LIPID MONITORING Q5 YEARS  11/18/2014     INFLUENZA VACCINE (SYSTEM ASSIGNED)  09/01/2017     PHQ-9 Q6 MONTHS  12/06/2017         Suggested websites for health information:  Www.[employer] : Up to date and easily searchable information on multiple topics.  Www.[employer].com.gov : medication info, interactive tutorials, watch real surgeries online  Www.familydoctor.org : good info from the Academy of Family Physicians  Www.cdc.gov : public health info, travel advisories, epidemics (H1N1)  Www.aap.org : children's health info, normal development, vaccinations  Www.health.[facility].[place].us : [place] dept of health, public health issues in [place], N1N1    Your care team:                            Family Medicine Internal Medicine   MD Keyon Wolfe MD Shantel Branch-Fleming, MD Katya Georgiev PA-C Nam Ho, MD Pediatrics   ANA Gonzalez, MD Karina Pang CNP, MD Deborah Mielke, MD Kim Thein, APRN CNP      Clinic hours: Monday - Thursday 7 am-7 pm; Fridays 7 am-5 pm.   Urgent care: Monday - Friday 11 am-9 pm; Saturday and Sunday 9 am-5 pm.  Pharmacy : Monday -Thursday 8 am-8 pm; Friday 8 am-6 pm; Saturday and Sunday 9 am-5 pm.     Clinic: [phone]   Pharmacy: [phone]

## 2017-11-24 ENCOUNTER — TELEPHONE (OUTPATIENT)
Dept: OTHER | Facility: CLINIC | Age: 32
End: 2017-11-24

## 2017-11-24 DIAGNOSIS — L03.213 PRESEPTAL CELLULITIS OF LEFT EYE: Primary | ICD-10-CM

## 2017-11-24 RX ORDER — SULFAMETHOXAZOLE/TRIMETHOPRIM 800-160 MG
1 TABLET ORAL 2 TIMES DAILY
Qty: 20 TABLET | Refills: 0 | Status: SHIPPED | OUTPATIENT
Start: 2017-11-24 | End: 2017-11-28

## 2017-11-28 DIAGNOSIS — B37.31 CANDIDIASIS OF VAGINA: Primary | ICD-10-CM

## 2017-11-28 RX ORDER — FLUCONAZOLE 150 MG/1
150 TABLET ORAL ONCE
Qty: 3 TABLET | Refills: 0 | Status: SHIPPED | OUTPATIENT
Start: 2017-11-28 | End: 2017-11-28

## 2017-11-30 ENCOUNTER — TELEPHONE (OUTPATIENT)
Dept: FAMILY MEDICINE | Facility: CLINIC | Age: 32
End: 2017-11-30

## 2018-03-08 ENCOUNTER — CARE COORDINATION (OUTPATIENT)
Dept: SURGERY | Facility: CLINIC | Age: 33
End: 2018-03-08

## 2018-03-08 DIAGNOSIS — Z98.84 H/O BARIATRIC SURGERY: Primary | ICD-10-CM

## 2018-03-16 ENCOUNTER — ALLIED HEALTH/NURSE VISIT (OUTPATIENT)
Dept: SURGERY | Facility: CLINIC | Age: 33
End: 2018-03-16
Payer: COMMERCIAL

## 2018-03-16 VITALS — WEIGHT: 251.3 LBS | BODY MASS INDEX: 36.06 KG/M2

## 2018-03-16 DIAGNOSIS — E66.01 MORBID OBESITY DUE TO EXCESS CALORIES (H): ICD-10-CM

## 2018-03-16 DIAGNOSIS — Z98.84 H/O BARIATRIC SURGERY: ICD-10-CM

## 2018-03-16 LAB
ALBUMIN SERPL-MCNC: 4.1 G/DL (ref 3.4–5)
ALP SERPL-CCNC: 105 U/L (ref 40–150)
ALT SERPL W P-5'-P-CCNC: 71 U/L (ref 0–50)
ANION GAP SERPL CALCULATED.3IONS-SCNC: 7 MMOL/L (ref 3–14)
AST SERPL W P-5'-P-CCNC: 38 U/L (ref 0–45)
BILIRUB SERPL-MCNC: 0.4 MG/DL (ref 0.2–1.3)
BUN SERPL-MCNC: 16 MG/DL (ref 7–30)
CALCIUM SERPL-MCNC: 9.3 MG/DL (ref 8.5–10.1)
CHLORIDE SERPL-SCNC: 107 MMOL/L (ref 94–109)
CO2 SERPL-SCNC: 26 MMOL/L (ref 20–32)
CREAT SERPL-MCNC: 0.68 MG/DL (ref 0.52–1.04)
DEPRECATED CALCIDIOL+CALCIFEROL SERPL-MC: 41 UG/L (ref 20–75)
ERYTHROCYTE [DISTWIDTH] IN BLOOD BY AUTOMATED COUNT: 14.2 % (ref 10–15)
FERRITIN SERPL-MCNC: 74 NG/ML (ref 12–150)
GFR SERPL CREATININE-BSD FRML MDRD: >90 ML/MIN/1.7M2
GLUCOSE SERPL-MCNC: 82 MG/DL (ref 70–99)
HCT VFR BLD AUTO: 44 % (ref 35–47)
HGB BLD-MCNC: 14.6 G/DL (ref 11.7–15.7)
MCH RBC QN AUTO: 29.3 PG (ref 26.5–33)
MCHC RBC AUTO-ENTMCNC: 33.2 G/DL (ref 31.5–36.5)
MCV RBC AUTO: 88 FL (ref 78–100)
PLATELET # BLD AUTO: 261 10E9/L (ref 150–450)
POTASSIUM SERPL-SCNC: 4.2 MMOL/L (ref 3.4–5.3)
PROT SERPL-MCNC: 8 G/DL (ref 6.8–8.8)
PTH-INTACT SERPL-MCNC: 57 PG/ML (ref 18–80)
RBC # BLD AUTO: 4.98 10E12/L (ref 3.8–5.2)
SODIUM SERPL-SCNC: 140 MMOL/L (ref 133–144)
TSH SERPL DL<=0.005 MIU/L-ACNC: 1.29 MU/L (ref 0.4–4)
WBC # BLD AUTO: 10.8 10E9/L (ref 4–11)

## 2018-03-16 NOTE — PATIENT INSTRUCTIONS
"GOALS:  Relating To Eating:  - Eat slowly (20-30 minutes per meal), chewing foods well (25 chews per bite/applesauce consistency).  - 9\" Plate method (1/2 plate non-starchy vegetables/fruit, 1/4 plate lean protein, 1/4 plate whole grain starch - no more than 1/2 cup carb/meal).  - Eat 3 meals per day.  - Consume 60-90 g protein per day.  - Continue Weight Watcher Program.    Relating to beverages:  -Reduce caffeine/carbonation/calorie containing beverages.  -Separate fluids from meals by 30 minutes before, during, and after eating.  -Drink 48-64 ounces of fluid per day.    Relating to dietary supplements:  -Take the following after a Laparoscopic Adjustable Band Surgery:    Multivitamin/minerals: adult dose once daily (containing 18 mg of iron)    Calcium containing vitamin D: 500 mg 3 times daily or 600 mg 2 times daily    B-50 Complex once daily    Pham Osborne RD, LD  Voicemail: [phone]  Appointments: [phone]    "

## 2018-03-16 NOTE — PROGRESS NOTES
"New Re-establish Bariatric Nutrition Consultation Note    Reason For Visit: Nutrition Assessment    Malika So is a 32 year old presenting today for new re-establish bariatric nutrition consult. Pt had a LAGB placed in  with Dr. Tinajero. Pt referred by Rubi Morris PA-C.     ANTHROPOMETRICS:  Initial Estimated body mass index is 36.06 kg/(m^2) as calculated from the following:    Height as of 17: 1.778 m (5' 10\").    Initial Weight as of this encounter: 251.3 lbs    Highest weight before surgery: 290 lbs  Lowest weight since surgery: 130 lbs    *Weight regain began when pt was recommended to gain weight to help become pregnant.     SUPPLEMENT INFORMATION:  Multivitamin from [employer]    NUTRITION HISTORY:    Pt was 287 lbs in 2018 and started Weight Watchers. She has since lost 35.7 lbs.     Pt feels her weight has graduly increased since before her first child was born when she weighed 130 lbs but was told she needed to gain weight to get pregnant. She then gained to 170lbs and was able to get pregnant but she had a difficult pregnancy and only tolerated milk shakes. By the second pregnancy (child now 2 years old) she was 220 lbs and was told not to gain any more weight.     *Since she had a  she has had intermittent port site pain. The pain is severe and only happens 1x/month and lasts 2-3 minutes.    NUTRITION DIAGNOSIS:  Obesity r/t long history of self-monitoring deficit and excessive energy intake aeb BMI >30.    INTERVENTION:  Intervention Provided/Education Provided: Praised pt for weight loss. Discussed maintenance diet guidelines after weight loss surgery, vitamin/mineral recommendations after LAGB, protein intake, mindful eating, and exercise. Discussed swimming or increasing steps as a possible way to increase exercise. Gave encouragement and support. Provided pt with \"Maintenance Diet Guidelines after Bariatric Surgery\" handout, \"After Gastric Band: Vitamin and Mineral " "Recommendations\" handout, \"Sources of Protein\" handout, \"The Plate Method\" handout, list of goals, and RD contact information.     Patient Understanding: Good  Expected Compliance: Good    GOALS:  Relating To Eating:  - Eat slowly (20-30 minutes per meal), chewing foods well (25 chews per bite/applesauce consistency).  - 9\" Plate method (1/2 plate non-starchy vegetables/fruit, 1/4 plate lean protein, 1/4 plate whole grain starch - no more than 1/2 cup carb/meal).  - Eat 3 meals per day.  - Consume 60-90 g protein per day.  - Continue Weight Watcher Program.    Relating to beverages:  -Reduce caffeine/carbonation/calorie containing beverages.  -Separate fluids from meals by 30 minutes before, during, and after eating.  -Drink 48-64 ounces of fluid per day.    Relating to dietary supplements:  -Take the following after a Laparoscopic Adjustable Band Surgery:    Multivitamin/minerals: adult dose once daily (containing 18 mg of iron)    Calcium containing vitamin D: 500 mg 3 times daily or 600 mg 2 times daily    B-50 Complex once daily    Follow-Up:   1 month or PRN    Time spent with patient: 60 minutes    Steffany Osborne RD, LD  Pager: [phone]        "

## 2018-03-16 NOTE — MR AVS SNAPSHOT
MRN:[id number]                      After Visit Summary   3/16/2018    Malika So    MRN: [id number]           Visit Information        Provider Department      3/16/2018 9:00 AM Steffany Osborne RD [facility] Surgical Weight Management        Your next 10 appointments already scheduled     Mar 19, 2018  2:00 PM CDT   XR UPPER GI with UCXR2, UC BRODIEU RAD   [facility] Center Xray ([facility])    [address]  1st Floor  [place] [id number] [phone]           Please bring a list of your current medicines to your exam. (Include vitamins, minerals and over-the-counter medicines.) Leave your valuables at home.  Tell the doctor if there is a chance you could be pregnant.  If you had a barium enema within two days of the exam, you will need to take 3 bisacodyl (Dulcolax) tablets the day before your exam.  Do not eat, chew gum, or smoke for 8 hours before your exam. Keep drinking clear liquids until 2 hours before the exam. Clear liquids include water, clear juice, black coffee or clear tea without milk, Gatorade, or clear soda.  Take your usual medicines unless your doctor tells you not to. Take them with small sips of water.  Please call the Imaging Department at your exam site with any questions.            Mar 23, 2018  8:00 AM CDT   (Arrive by 7:45 AM)   New Bariatric Revision with ANA Wall [facility] Surgical Weight Management ([facility])    [address]  4th Floor  [place] [address] [phone]           Do not wear perfume.            Apr 18, 2018 11:00 AM CDT   New Patient Visit with Celeste Mai MD   [facility] ([facility])    [facility] [place] [address] [facility] [address]  [address]  [address]  [place] [id number] [phone]              Care Instructions    GOALS:  Relating To Eating:  - Eat slowly (20-30 minutes per meal),  "chewing foods well (25 chews per bite/applesauce consistency).  - 9\" Plate method (1/2 plate non-starchy vegetables/fruit, 1/4 plate lean protein, 1/4 plate whole grain starch - no more than 1/2 cup carb/meal).  - Eat 3 meals per day.  - Consume 60-90 g protein per day.  - Continue Weight Watcher Program.    Relating to beverages:  -Reduce caffeine/carbonation/calorie containing beverages.  -Separate fluids from meals by 30 minutes before, during, and after eating.  -Drink 48-64 ounces of fluid per day.    Relating to dietary supplements:  -Take the following after a Laparoscopic Adjustable Band Surgery:    Multivitamin/minerals: adult dose once daily (containing 18 mg of iron)    Calcium containing vitamin D: 500 mg 3 times daily or 600 mg 2 times daily    B-50 Complex once daily    Pham Osborne RD, LD  Voicemail: [phone]  Appointments: [phone]           MyChart Information     [employer] is an electronic gateway that provides easy, online access to your medical records. With [employer], you can request a clinic appointment, read your test results, renew a prescription or communicate with your care team.     To sign up for [employer] visit the website at [email or web address]   You will be asked to enter the access code listed below, as well as some personal information. Please follow the directions to create your username and password.     Your access code is: [email or web address]  Expires: 2018  7:30 AM     Your access code will  in 90 days. If you need help or a new code, please contact your [facility] or call [phone] for assistance.        Care EveryWhere ID     This is your Care EveryWhere ID. This could be used by other organizations to access your [place] medical records  [id number]        Equal Access to Services     FABIO HARDY AH: Aly Claudio, arnoldo worthy, brigitte lobato. So corry " [phone].    ATENCIÓN: Si habla español, tiene a rowe disposición servicios gratuitos de asistencia lingüística. Llame al [phone].    We comply with applicable federal civil rights laws and [place] laws. We do not discriminate on the basis of race, color, national origin, age, disability, sex, sexual orientation, or gender identity.

## 2018-03-19 ENCOUNTER — RADIANT APPOINTMENT (OUTPATIENT)
Dept: GENERAL RADIOLOGY | Facility: CLINIC | Age: 33
End: 2018-03-19
Attending: PHYSICIAN ASSISTANT
Payer: COMMERCIAL

## 2018-03-19 DIAGNOSIS — Z98.84 H/O BARIATRIC SURGERY: ICD-10-CM

## 2018-03-23 ENCOUNTER — OFFICE VISIT (OUTPATIENT)
Dept: SURGERY | Facility: CLINIC | Age: 33
End: 2018-03-23
Payer: COMMERCIAL

## 2018-03-23 VITALS
OXYGEN SATURATION: 100 % | WEIGHT: 246.1 LBS | HEIGHT: 70 IN | SYSTOLIC BLOOD PRESSURE: 124 MMHG | DIASTOLIC BLOOD PRESSURE: 77 MMHG | HEART RATE: 76 BPM | BODY MASS INDEX: 35.23 KG/M2

## 2018-03-23 DIAGNOSIS — E66.812 CLASS 2 OBESITY WITH BODY MASS INDEX (BMI) OF 36.0 TO 36.9 IN ADULT, UNSPECIFIED OBESITY TYPE, UNSPECIFIED WHETHER SERIOUS COMORBIDITY PRESENT: ICD-10-CM

## 2018-03-23 DIAGNOSIS — Z98.84 STATUS POST GASTRIC BANDING: Primary | ICD-10-CM

## 2018-03-23 RX ORDER — PHENTERMINE HYDROCHLORIDE 15 MG/1
15 CAPSULE ORAL EVERY MORNING
Qty: 30 CAPSULE | Refills: 3 | Status: SHIPPED | OUTPATIENT
Start: 2018-03-23 | End: 2019-04-24

## 2018-03-23 RX ORDER — TOPIRAMATE 25 MG/1
TABLET, FILM COATED ORAL
Qty: 90 TABLET | Refills: 3 | Status: SHIPPED | OUTPATIENT
Start: 2018-03-23 | End: 2019-04-24

## 2018-03-23 ASSESSMENT — PAIN SCALES - GENERAL: PAINLEVEL: NO PAIN (0)

## 2018-03-23 NOTE — MR AVS SNAPSHOT
After Visit Summary   3/23/2018    Malika So    MRN: [id number]           Patient Information     Date Of Birth          1985        Visit Information        Provider Department      3/23/2018 8:00 AM Rubi Morris PA-C [facility] Surgical Weight Management        Today's Diagnoses     Status post gastric banding    -  1    Class 2 obesity with body mass index (BMI) of 36.0 to 36.9 in adult, unspecified obesity type, unspecified whether serious comorbidity present          Care Instructions    See Rubi in 1 month RBS    See dietitian in 1 month     Upper GI in 1 month    Start topiramate ramp tp 50mg    Start phentermine 15mg    MEDICATION STARTED AT THIS APPOINTMENT    We are starting Phentermine. Take one tablet in the morning.  Call the nurse at [phone] if you have any questions or concerns. (Do not stop taking it if you don't think it's working. For some people it works without them knowing it.)    Phentermine is being prescribed because you identified hunger as one of the main causes for your extra weight.      Our patients on Phentermine find that they:    >feel less hunger    >find it easier to push the plate away   >have an easier time eating less    For some of our patients, these feelings are very real and immediate. For other patients, the feelings are less obvious. They don't feel much of a change but find they've lost weight. Like all weight loss medications, Phentermine  works best when you help it work. This means:  1. Having less tempting high calorie (fattening) food around the house or office. (For people with strong cravings this is very important.)   2. Staying away from situations or people that may trigger your cravings .   3. Eating out only one time or less each week.  4. Eating your meals at a table with the TV or computer off.    Side-effects. Phentermine is generally well tolerated. The main side-effects we see are feelings of racing pulse  or rapid heart beat. Some people can get an elevated blood pressure. Because of this we may have you come back within a week or so of starting the medication for a blood pressure check.         In order to get refills of this or any medication we prescribe you must be seen in the medical weight mgmt clinic every 2-3 months. Please have your pharmacy fax a refill request to [phone].      MEDICATION STARTED AT THIS APPOINTMENT  We are starting topiramate at bedtime.  Start one tab, 25 mg, for a week. Go up to 50 mg (2 tabs) for the next week. Stay at 2 tabs until you are seen again. Call the nurse at [phone] if you have any questions or concerns. (Do not stop taking it if you don't think it's working. For some people it works even though they do not feel much different.)    Topiramate (Topamax) is a medication that is used most often to treat migraine headaches or for seizures. It has also been found to help with weight loss. Although it's not currently FDA approved for weight loss, it has been used safely for a number of years to help people who are carrying extra weight.     Just how topiramate helps with weight loss has not been exactly determined. However it seems to work on areas of the brain to quiet down signals related to eating.      Topiramate may make you:    >feel less interest in eating in between meals   >think less about food and eating   >find it easier to push the plate away   >find giving up pop easier    >have an easier time eating less    For some of our patients, the pills work right away. They feel and think quite differently about food. Other patients don't feel much of a change but find in fact they have lost weight! Like all weight loss medications, topiramate works best when you help it work.  This means:    1) Have less tempting high calorie (fattening) food around the house or office    2) Have lower calorie food (fruits, vegetables,low fat meats and dairy) for snacks    3) Eat  out only one time or less each week.   4) Eat your meals at a table with the TV or computer off.    Side-effects. Topiramate is generally well tolerated. The main side-effects we see are:   Tingling in hands,feet, or face (usually not very troublesome)   Mental confusion and word finding trouble (about 10% of patients have this.)     Feeling sleepy or a bit dopey- this goes away very soon after starting.    One of the dangers of topiramate is the possibility of birth defects--if you get pregnant when you are on it, there is the risk that your baby will be born with a cleft lip or palate.  If you are on topiramate and of child bearing age, you need to be on a reliable form of birth control or refrain from sexual intercourse.     Please refer to the pharmacy insert for more information on side-effects. Since many pharmacists are not familiar with the use of topiramate in weight loss, calling the clinic will get you the most accurate information on the use of this medication for weight loss.     In order to get refills of this or any medication we prescribe you must be seen in the medical weight mgmt clinic every 2-3 months. Please have your pharmacy fax a refill request to [phone].            Follow-ups after your visit        Follow-up notes from your care team     Return in 3 months (on 6/23/2018).      Your next 10 appointments already scheduled     Apr 18, 2018 11:00 AM CDT   New Patient Visit with Celeste Mai MD   [facility] ([facility])    [place] [facility] [address]  [address] [facility] [id number] [phone]              Future tests that were ordered for you today     Open Future Orders        Priority Expected Expires Ordered    X-ray UGI with Adjustable Band Protocol Routine 3/23/2018 3/23/2019 3/23/2018            Who to contact     Please call your clinic at [phone] to:    Ask questions about your health    Make or  "cancel appointments    Discuss your medicines    Learn about your test results    Speak to your doctor            Additional Information About Your Visit        MyChart Information     [employer] is an electronic gateway that provides easy, online access to your medical records. With [employer], you can request a clinic appointment, read your test results, renew a prescription or communicate with your care team.     To sign up for [employer] visit the website at [email or web address]   You will be asked to enter the access code listed below, as well as some personal information. Please follow the directions to create your username and password.     Your access code is: [email or web address]  Expires: 2018  7:30 AM     Your access code will  in 90 days. If you need help or a new code, please contact your [facility] or call [phone] for assistance.        Care EveryWhere ID     This is your Care EveryWhere ID. This could be used by other organizations to access your [place] medical records  [id number]        Your Vitals Were     Pulse Height Pulse Oximetry BMI (Body Mass Index)          76 5' 10\" 100% 35.31 kg/m2         Blood Pressure from Last 3 Encounters:   18 124/77   17 131/83   17 136/80    Weight from Last 3 Encounters:   18 246 lb 1.6 oz   18 251 lb 4.8 oz   17 284 lb 6.4 oz                 Today's Medication Changes          These changes are accurate as of 3/23/18  8:51 AM.  If you have any questions, ask your nurse or doctor.               Start taking these medicines.        Dose/Directions    phentermine 15 MG capsule   Used for:  Class 2 obesity with body mass index (BMI) of 36.0 to 36.9 in adult, unspecified obesity type, unspecified whether serious comorbidity present   Started by:  Rubi Morris PA-C        Dose:  15 mg   Take 1 capsule (15 mg) by mouth every morning   Quantity:  30 capsule   Refills:  3       " topiramate 25 MG tablet   Commonly known as:  TOPAMAX   Used for:  Class 2 obesity with body mass index (BMI) of 36.0 to 36.9 in adult, unspecified obesity type, unspecified whether serious comorbidity present   Started by:  Rubi Morris PA-C        25mg at bedtime for week 1, 50mg at bedtime for week 2 and thereafter   Quantity:  90 tablet   Refills:  3         Stop taking these medicines if you haven't already. Please contact your care team if you have questions.     cephALEXin 500 MG capsule   Commonly known as:  KEFLEX   Stopped by:  Rubi Morris PA-C           clonazePAM 1 MG tablet   Commonly known as:  klonoPIN   Stopped by:  Rubi Morris PA-C           cyclobenzaprine 10 MG tablet   Commonly known as:  FLEXERIL   Stopped by:  Rubi Morris PA-C           HYDROcodone-acetaminophen 5-325 MG per tablet   Commonly known as:  NORCO   Stopped by:  Rubi Morris PA-C                Where to get your medicines      These medications were sent to [facility] - [address] [facility]  [address] [facility] [place] [address]     Phone:  [phone]     topiramate 25 MG tablet         Some of these will need a paper prescription and others can be bought over the counter.  Ask your nurse if you have questions.     Bring a paper prescription for each of these medications     phentermine 15 MG capsule                Primary Care Provider Office Phone # Fax #    Josefina Welch [phone]       [address] [place] [address]  [facility] [address]        Equal Access to Services     [facility] AH: Hadii aad ku hadasho Soomaali, waaxda luqadaha, qaybta kaalmada adeegyada, brigitte avery hayalena preston. So [facility] [phone].    ATENCIÓN: Si habla español, tiene a rowe disposición servicios gratuitos de asistencia lingüística. Llame al [phone].    We comply with applicable federal civil rights laws and [place] laws. We  do not discriminate on the basis of race, color, national origin, age, disability, sex, sexual orientation, or gender identity.            Thank you!     Thank you for choosing [facility]NAGEMENT  for your care. Our goal is always to provide you with excellent care. Hearing back from our patients is one way we can continue to improve our services. Please take a few minutes to complete the written survey that you may receive in the mail after your visit with us. Thank you!             Your Updated Medication List - Protect others around you: Learn how to safely use, store and throw away your medicines at www.disposemymeds.org.          This list is accurate as of 3/23/18  8:51 AM.  Always use your most recent med list.                   Brand Name Dispense Instructions for use Diagnosis    levonorgestrel 20 MCG/24HR IUD    MIRENA     1 each by Intrauterine route once        phentermine 15 MG capsule     30 capsule    Take 1 capsule (15 mg) by mouth every morning    Class 2 obesity with body mass index (BMI) of 36.0 to 36.9 in adult, unspecified obesity type, unspecified whether serious comorbidity present       ranitidine 150 MG tablet    ZANTAC     Take 1 tablet (150 mg) by mouth At Bedtime        sertraline 100 MG tablet    ZOLOFT    180 tablet    Take 2 tablets (200 mg) by mouth daily    Anxiety       topiramate 25 MG tablet    TOPAMAX    90 tablet    25mg at bedtime for week 1, 50mg at bedtime for week 2 and thereafter    Class 2 obesity with body mass index (BMI) of 36.0 to 36.9 in adult, unspecified obesity type, unspecified whether serious comorbidity present

## 2018-03-23 NOTE — LETTER
"3/23/2018       RE: Malika So  [address] [place] [address]     Dear Colleague,    Thank you for referring your patient, Malika So, to the [facility] at [facility]. Please see a copy of my visit note below.        New Re-establish Bariatric Surgery Consultation Note    RE: Malika So  MR#: [id number]  : 1985      Referring provider:     Chief Complaint/Reason for visit: evaluation for possible weight loss surgery    Dear Josefina Welch MD (General),    I had the pleasure of seeing your patient, Malika So, to evaluate her obesity and consider her for possible weight loss surgery. As you know, Malika So is 32 year old.  She has a height of 5' 10\", a weight of 246 lbs 1.6 oz, and calculated Body mass index is 35.31 kg/(m^2).    HISTORY OF PRESENT ILLNESS:  Weight Loss History Reviewed with Patient 3/23/2018   How long have you been overweight? Since early childhood   What is the most that you have ever weighed? 295   What is the most weight you have lost? 150   I have tried the following methods to lose weight Watching portions or calories, Exercise, Weight Watchers, Atkins type diet (low carb/high protein), Weight Loss Surgery   I have tried the following weight loss medications? (Check all that apply) None   Have you ever had weight loss surgery? Yes   Please select the type of weight loss surgery you had (select all that apply): adjustable gastric band / LapBand or Realize Band       Lap band     Last clinic visit also 2009 lap gasper with Dr Tinajero    Unsure how much fluid in band    UGI today shows very tight band    Starting weight 295 lbs    Lowest after the band 130 lbs     she was having difficulty getting pregnant at 130 lbs so she tried to gain weight and gained to 170 lbs.    2nd pregnancy 2016 she weight 220 lbs and then 230-240 lbs after her " 2nd pregnancy.    For the last couple years she has been pushing food through her band with fluid because food is getting stuck.    +GERD if she eats in the evening    Occasional vomiting    She tolerated chicken and vegetables.  She can't tolerate eating bread.      She has lost 42 lbs since January on Weight Watchers    CO-MORBIDITIES OF OBESITY INCLUDE:     3/23/2018   I have the following co-morbidities associated with obesity: High Blood Pressure, GERD (Reflux), Weight Bearing Joint Pain, Stress Incontinence   Are you taking daily medication for heartburn, acid reflux, or GERD (acid reflux disease)? Yes       PAST MEDICAL HISTORY:  Past Medical History:   Diagnosis Date     Anxiety      GERD without esophagitis      Hypertension     prior to weight loss     Latent tuberculosis     neg chest x-ray. treated with INH for 9 moniths     Mild major depression (H)     celexa 100 mg      Morbid obesity (H)        PAST SURGICAL HISTORY:  Past Surgical History:   Procedure Laterality Date      SECTION N/A 2016    Procedure:  SECTION;  Surgeon: Kelli Osullivan MD;  Location: [facility]     CHOLECYSTECTOMY, LAPOROSCOPIC       LAP ADJUSTABLE GASTRIC BAND      [facility] lost over 150 lb      LASIK       TONSILLECTOMY         FAMILY HISTORY:   Family History   Problem Relation Age of Onset     C.A.D. 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 Yes   Who do you have in your support  network that can be available to help you for the first 2 weeks after surgery? ,parents,brother   Who can you count on for support throughout your weight loss surgery journey? family n friends   Can you afford 3 meals a day?  Yes   Can you afford 50-60 dollars a month for vitamins? Yes       HABITS:     3/23/2018   How often do you drink alcohol? Monthly or less   If you do drink alcohol, how many drinks might you have in a day? (one drink = 5 oz. wine, 1 can/bottle of beer, 1 shot liquor) 1 or 2   Have you ever used any of the following nicotine products? No   Have you or are you currently using street drugs or prescription strength medication for which you do not have a prescription for? No   Do you have a history of chemical dependency (alcohol or drug abuse)? No       PSYCHOLOGICAL HISTORY:   Psychological History Reviewed With Patient 3/23/2018   Have you ever attempted suicide? Never.   Have you had thoughts of suicide in the past year? No   Have you ever been hospitalized for mental illness or a suicide attempt? Never.   Do you have a history of chronic pain? No   Have you ever been diagnosed with fibromyalgia? No   Are you currently being treated for any of the following? (select all that apply) Depression, Anxiety   Are you currently seeing a therapist or counselor?  No   Are you currently seeing a psychiatrist? No       ROS:     3/23/2018   Skin:  Skin fold rashes (groin or other folds), Leg swelling, Varicose veins   HEENT: Headaches   Musculoskeletal: Joint Pain, Back pain, Limited mobility, Swelling of legs   Cardiovascular: Shortness of breath with activity   Pulmonary: Shortness of breath with activity, Snoring   Gastrointestinal: Heartburn, Reflux   Genitourinary: Stress incontinence (losing urine when coughing, sneezing, etc.)   Hematological: None of the above   Neurological: None of the above   Female only: Excessive menstrual bleeding, Irregular menstrual cycles, Birth control       EATING  "BEHAVIORS:     3/23/2018   Have you or anyone else thought that you had an eating disorder? No   Do you currently binge eat (eat a large amount of food in a short time)? No   Are you an emotional eater? Yes   Do you get up to eat after falling asleep? No       EXERCISE:     3/23/2018   How often do you exercise? Never   What keeps you from being more active?  Pain, Lack of Time       MEDICATIONS:  Current Outpatient Prescriptions   Medication     sertraline (ZOLOFT) 100 MG tablet     ranitidine (ZANTAC) 150 MG tablet     levonorgestrel (MIRENA) 20 MCG/24HR IUD     No current facility-administered medications for this visit.        ALLERGIES:  Allergies   Allergen Reactions     Adhesive Tape      Fenugreek [Trigonella Foenum-Graecum] Hives     Ragweeds Hives       LABS/IMAGING/MEDICAL RECORDS REVIEW:     Impression:   During 5 minutes of observation, two swallows of thin liquid barium  did not traverse the gastric lap band and remained within the proximal  gastric pouch. This suggests a gastric lap band which is too tight.  A  stomal diameter could not be measured as there was no transit through  the lap band.    PHYSICAL EXAM:  /77  Pulse 76  Ht 5' 10\"  Wt 246 lb 1.6 oz  SpO2 100%  BMI 35.31 kg/m2  General: No apparent distress  Neuro: A & O x 3  Head: Atraumatic, normocephalic  Eyes: PERRL, EOMI  Skin: warm and dry, no rashes on exposed skin  Respiratory: respirations unlabored  Abdomen: soft NT ND  Extremities: No LE swelling    A/P:     32 y.o. Female with hx of laparoscopic gastric band in 2009.  Recent UGI shows band that is too tight and also correlates with her symptoms.  Plan is to remove all fluid today from her band.  We also discussed anti obesity medications and agreed to start phentermine and topiramate.  I will see her again in 1 month to follow up on lap band and if UGI looks good we can start to add fluid back to her band and I will also f/u with her medications.     Start phentermine 15 " mg    Start topiramate ramp to 50 mg     Removal all fluid from band due to tight band and concentric dilatation.    See Rubi Morris in 1 months    Upper GI in 4 weeks    See YOLI in 1 month      Procedure details: Patient was positioned in the supine position on the procedure table and port was palpated under the skin. Skin was prepped with an alcohol pad and a 20 gauge non coring del toro needle was used to access port.  5cc was removed from the band and needle was removed. Patient was able to drink a glass of water after the adjustment without difficulties.        MEDICATION STARTED AT THIS APPOINTMENT    We are starting Phentermine. Take one tablet in the morning.  Call the nurse at [phone] if you have any questions or concerns. (Do not stop taking it if you don't think it's working. For some people it works without them knowing it.)    Phentermine is being prescribed because you identified hunger as one of the main causes for your extra weight.      Our patients on Phentermine find that they:    >feel less hunger    >find it easier to push the plate away   >have an easier time eating less    For some of our patients, these feelings are very real and immediate. For other patients, the feelings are less obvious. They don't feel much of a change but find they've lost weight. Like all weight loss medications, Phentermine  works best when you help it work. This means:  1. Having less tempting high calorie (fattening) food around the house or office. (For people with strong cravings this is very important.)   2. Staying away from situations or people that may trigger your cravings .   3. Eating out only one time or less each week.  4. Eating your meals at a table with the TV or computer off.    Side-effects. Phentermine is generally well tolerated. The main side-effects we see are feelings of racing pulse or rapid heart beat. Some people can get an elevated blood pressure. Because of this we may have you come back  within a week or so of starting the medication for a blood pressure check.         In order to get refills of this or any medication we prescribe you must be seen in the medical weight mgmt clinic every 2-3 months. Please have your pharmacy fax a refill request to [phone].      MEDICATION STARTED AT THIS APPOINTMENT  We are starting topiramate at bedtime.  Start one tab, 25 mg, for a week. Go up to 50 mg (2 tabs) for the next week. At the third week, take   3 tabs (75 mg).  Stay at 3 tabs until you are seen again. Call the nurse at [phone] if you have any questions or concerns. (Do not stop taking it if you don't think it's working. For some people it works even though they do not feel much different.)    Topiramate (Topamax) is a medication that is used most often to treat migraine headaches or for seizures. It has also been found to help with weight loss. Although it's not currently FDA approved for weight loss, it has been used safely for a number of years to help people who are carrying extra weight.     Just how topiramate helps with weight loss has not been exactly determined. However it seems to work on areas of the brain to quiet down signals related to eating.      Topiramate may make you:    >feel less interest in eating in between meals   >think less about food and eating   >find it easier to push the plate away   >find giving up pop easier    >have an easier time eating less    For some of our patients, the pills work right away. They feel and think quite differently about food. Other patients don't feel much of a change but find in fact they have lost weight! Like all weight loss medications, topiramate works best when you help it work.  This means:    1) Have less tempting high calorie (fattening) food around the house or office    2) Have lower calorie food (fruits, vegetables,low fat meats and dairy) for snacks    3) Eat out only one time or less each week.   4) Eat your meals at a table  with the TV or computer off.    Side-effects. Topiramate is generally well tolerated. The main side-effects we see are:   Tingling in hands,feet, or face (usually not very troublesome)   Mental confusion and word finding trouble (about 10% of patients have this.)     Feeling sleepy or a bit dopey- this goes away very soon after starting.    One of the dangers of topiramate is the possibility of birth defects--if you get pregnant when you are on it, there is the risk that your baby will be born with a cleft lip or palate.  If you are on topiramate and of child bearing age, you need to be on a reliable form of birth control or refrain from sexual intercourse.     Please refer to the pharmacy insert for more information on side-effects. Since many pharmacists are not familiar with the use of topiramate in weight loss, calling the clinic will get you the most accurate information on the use of this medication for weight loss.     In order to get refills of this or any medication we prescribe you must be seen in the medical weight mgmt clinic every 2-3 months. Please have your pharmacy fax a refill request to [phone].    I spent a total of 40 minutes face to face with Malika during today's office visit. Over 50% of this time was spent counseling the patient and/or coordinating care.    Sincerely,    Rubi Morris PA-C

## 2018-03-23 NOTE — NURSING NOTE
"(   Chief Complaint   Patient presents with     Consult     Needs band adjustment     )    ( Weight: 246 lb 1.6 oz )  ( Height: 5' 10\" )  ( BMI (Calculated): 35.39 )  (   )  (   )  (   )  (   )  (   )  (   )    ( BP: 124/77 )  (   )  (   )  (   )  ( Pulse: 76 )  (   )  ( SpO2: 100 % )    (   Patient Active Problem List   Diagnosis     Mild major depression (H)     Anxiety     Non-specific low back pain     Morbid obesity (H)     Bariatric surgery status     GERD without esophagitis    )  (   Current Outpatient Prescriptions   Medication Sig Dispense Refill     cephALEXin (KEFLEX) 500 MG capsule Take 1 capsule (500 mg) by mouth 2 times daily 20 capsule 0     HYDROcodone-acetaminophen (NORCO) 5-325 MG per tablet Take 1 tablet by mouth every 6 hours as needed for moderate to severe pain or pain . No further refills until follow-up appointment 12 tablet 0     cyclobenzaprine (FLEXERIL) 10 MG tablet Take 0.5-1 tablets (5-10 mg) by mouth 3 times daily as needed for muscle spasms 30 tablet 5     sertraline (ZOLOFT) 100 MG tablet Take 2 tablets (200 mg) by mouth daily 180 tablet 1     clonazePAM (KLONOPIN) 1 MG tablet Take 1 tablet up to 2 times daily as needed for anxiety - use sparingly 30 tablet 1     ranitidine (ZANTAC) 150 MG tablet Take 1 tablet (150 mg) by mouth At Bedtime       levonorgestrel (MIRENA) 20 MCG/24HR IUD 1 each by Intrauterine route once      )  ( Diabetes Eval:    )    ( Pain Eval:  No Pain (0) )    ( Wound Eval:       )    (   History   Smoking Status     Never Smoker   Smokeless Tobacco     Never Used    )    ( Signed By:  Brandon Ortiz; March 23, 2018; 8:20 AM )                    "

## 2018-03-23 NOTE — PATIENT INSTRUCTIONS
See Rubi in 1 month [facility]    See dietitian in 1 month     Upper GI in 1 month    Start topiramate ramp tp 50mg    Start phentermine 15mg    MEDICATION STARTED AT THIS APPOINTMENT    We are starting Phentermine. Take one tablet in the morning.  Call the nurse at [phone] if you have any questions or concerns. (Do not stop taking it if you don't think it's working. For some people it works without them knowing it.)    Phentermine is being prescribed because you identified hunger as one of the main causes for your extra weight.      Our patients on Phentermine find that they:    >feel less hunger    >find it easier to push the plate away   >have an easier time eating less    For some of our patients, these feelings are very real and immediate. For other patients, the feelings are less obvious. They don't feel much of a change but find they've lost weight. Like all weight loss medications, Phentermine  works best when you help it work. This means:  1. Having less tempting high calorie (fattening) food around the house or office. (For people with strong cravings this is very important.)   2. Staying away from situations or people that may trigger your cravings .   3. Eating out only one time or less each week.  4. Eating your meals at a table with the TV or computer off.    Side-effects. Phentermine is generally well tolerated. The main side-effects we see are feelings of racing pulse or rapid heart beat. Some people can get an elevated blood pressure. Because of this we may have you come back within a week or so of starting the medication for a blood pressure check.         In order to get refills of this or any medication we prescribe you must be seen in the medical weight mgmt clinic every 2-3 months. Please have your pharmacy fax a refill request to [phone].      MEDICATION STARTED AT THIS APPOINTMENT  We are starting topiramate at bedtime.  Start one tab, 25 mg, for a week. Go up to 50 mg (2 tabs) for the  next week. Stay at 2 tabs until you are seen again. Call the nurse at [phone] if you have any questions or concerns. (Do not stop taking it if you don't think it's working. For some people it works even though they do not feel much different.)    Topiramate (Topamax) is a medication that is used most often to treat migraine headaches or for seizures. It has also been found to help with weight loss. Although it's not currently FDA approved for weight loss, it has been used safely for a number of years to help people who are carrying extra weight.     Just how topiramate helps with weight loss has not been exactly determined. However it seems to work on areas of the brain to quiet down signals related to eating.      Topiramate may make you:    >feel less interest in eating in between meals   >think less about food and eating   >find it easier to push the plate away   >find giving up pop easier    >have an easier time eating less    For some of our patients, the pills work right away. They feel and think quite differently about food. Other patients don't feel much of a change but find in fact they have lost weight! Like all weight loss medications, topiramate works best when you help it work.  This means:    1) Have less tempting high calorie (fattening) food around the house or office    2) Have lower calorie food (fruits, vegetables,low fat meats and dairy) for snacks    3) Eat out only one time or less each week.   4) Eat your meals at a table with the TV or computer off.    Side-effects. Topiramate is generally well tolerated. The main side-effects we see are:   Tingling in hands,feet, or face (usually not very troublesome)   Mental confusion and word finding trouble (about 10% of patients have this.)     Feeling sleepy or a bit dopey- this goes away very soon after starting.    One of the dangers of topiramate is the possibility of birth defects--if you get pregnant when you are on it, there is the risk that  your baby will be born with a cleft lip or palate.  If you are on topiramate and of child bearing age, you need to be on a reliable form of birth control or refrain from sexual intercourse.     Please refer to the pharmacy insert for more information on side-effects. Since many pharmacists are not familiar with the use of topiramate in weight loss, calling the clinic will get you the most accurate information on the use of this medication for weight loss.     In order to get refills of this or any medication we prescribe you must be seen in the medical weight mgmt clinic every 2-3 months. Please have your pharmacy fax a refill request to [phone].

## 2018-03-23 NOTE — PROGRESS NOTES
"    New Re-establish Bariatric Surgery Consultation Note    RE: Malika So  MR#: [id number]  : 1985      Referring provider:     Chief Complaint/Reason for visit: evaluation for possible weight loss surgery    Dear Josefina Welch MD (General),    I had the pleasure of seeing your patient, Malika So, to evaluate her obesity and consider her for possible weight loss surgery. As you know, Malika So is 32 year old.  She has a height of 5' 10\", a weight of 246 lbs 1.6 oz, and calculated Body mass index is 35.31 kg/(m^2).    HISTORY OF PRESENT ILLNESS:  Weight Loss History Reviewed with Patient 3/23/2018   How long have you been overweight? Since early childhood   What is the most that you have ever weighed? 295   What is the most weight you have lost? 150   I have tried the following methods to lose weight Watching portions or calories, Exercise, Weight Watchers, Atkins type diet (low carb/high protein), Weight Loss Surgery   I have tried the following weight loss medications? (Check all that apply) None   Have you ever had weight loss surgery? Yes   Please select the type of weight loss surgery you had (select all that apply): adjustable gastric band / LapBand or Realize Band       Lap band     Last clinic visit also 2009 lap gasper with Dr Tinajero    Unsure how much fluid in band    UGI today shows very tight band    Starting weight 295 lbs    Lowest after the band 130 lbs     she was having difficulty getting pregnant at 130 lbs so she tried to gain weight and gained to 170 lbs.    2nd pregnancy  she weight 220 lbs and then 230-240 lbs after her 2nd pregnancy.    For the last couple years she has been pushing food through her band with fluid because food is getting stuck.    +GERD if she eats in the evening    Occasional vomiting    She tolerated chicken and vegetables.  She can't tolerate eating bread.      She has lost 42 lbs since January on Weight " Watchers    CO-MORBIDITIES OF OBESITY INCLUDE:     3/23/2018   I have the following co-morbidities associated with obesity: High Blood Pressure, GERD (Reflux), Weight Bearing Joint Pain, Stress Incontinence   Are you taking daily medication for heartburn, acid reflux, or GERD (acid reflux disease)? Yes       PAST MEDICAL HISTORY:  Past Medical History:   Diagnosis Date     Anxiety      GERD without esophagitis      Hypertension     prior to weight loss     Latent tuberculosis     neg chest x-ray. treated with INH for 9 moniths     Mild major depression (H)     celexa 100 mg      Morbid obesity (H)        PAST SURGICAL HISTORY:  Past Surgical History:   Procedure Laterality Date      SECTION N/A 2016    Procedure:  SECTION;  Surgeon: Kelli Osullivan MD;  Location: [facility]     CHOLECYSTECTOMY, LAPOROSCOPIC       LAP ADJUSTABLE GASTRIC BAND      [facility] lost over 150 lb      LASIK       TONSILLECTOMY         FAMILY HISTORY:   Family History   Problem Relation Age of Onset     C.A.D. 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 Yes   Who do you have in your support network that can be available to help you for the first 2 weeks after surgery? ,parents,brother   Who can you count on for support throughout your weight loss surgery journey? family n friends   Can you afford 3 meals a day?  Yes   Can you afford 50-60 dollars a month for vitamins? Yes       HABITS:      3/23/2018   How often do you drink alcohol? Monthly or less   If you do drink alcohol, how many drinks might you have in a day? (one drink = 5 oz. wine, 1 can/bottle of beer, 1 shot liquor) 1 or 2   Have you ever used any of the following nicotine products? No   Have you or are you currently using street drugs or prescription strength medication for which you do not have a prescription for? No   Do you have a history of chemical dependency (alcohol or drug abuse)? No       PSYCHOLOGICAL HISTORY:   Psychological History Reviewed With Patient 3/23/2018   Have you ever attempted suicide? Never.   Have you had thoughts of suicide in the past year? No   Have you ever been hospitalized for mental illness or a suicide attempt? Never.   Do you have a history of chronic pain? No   Have you ever been diagnosed with fibromyalgia? No   Are you currently being treated for any of the following? (select all that apply) Depression, Anxiety   Are you currently seeing a therapist or counselor?  No   Are you currently seeing a psychiatrist? No       ROS:     3/23/2018   Skin:  Skin fold rashes (groin or other folds), Leg swelling, Varicose veins   HEENT: Headaches   Musculoskeletal: Joint Pain, Back pain, Limited mobility, Swelling of legs   Cardiovascular: Shortness of breath with activity   Pulmonary: Shortness of breath with activity, Snoring   Gastrointestinal: Heartburn, Reflux   Genitourinary: Stress incontinence (losing urine when coughing, sneezing, etc.)   Hematological: None of the above   Neurological: None of the above   Female only: Excessive menstrual bleeding, Irregular menstrual cycles, Birth control       EATING BEHAVIORS:     3/23/2018   Have you or anyone else thought that you had an eating disorder? No   Do you currently binge eat (eat a large amount of food in a short time)? No   Are you an emotional eater? Yes   Do you get up to eat after falling asleep? No       EXERCISE:     3/23/2018   How often do you  "exercise? Never   What keeps you from being more active?  Pain, Lack of Time       MEDICATIONS:  Current Outpatient Prescriptions   Medication     sertraline (ZOLOFT) 100 MG tablet     ranitidine (ZANTAC) 150 MG tablet     levonorgestrel (MIRENA) 20 MCG/24HR IUD     No current facility-administered medications for this visit.        ALLERGIES:  Allergies   Allergen Reactions     Adhesive Tape      Fenugreek [Trigonella Foenum-Graecum] Hives     Ragweeds Hives       LABS/IMAGING/MEDICAL RECORDS REVIEW:     Impression:   During 5 minutes of observation, two swallows of thin liquid barium  did not traverse the gastric lap band and remained within the proximal  gastric pouch. This suggests a gastric lap band which is too tight.  A  stomal diameter could not be measured as there was no transit through  the lap band.    PHYSICAL EXAM:  /77  Pulse 76  Ht 5' 10\"  Wt 246 lb 1.6 oz  SpO2 100%  BMI 35.31 kg/m2  General: No apparent distress  Neuro: A & O x 3  Head: Atraumatic, normocephalic  Eyes: PERRL, EOMI  Skin: warm and dry, no rashes on exposed skin  Respiratory: respirations unlabored  Abdomen: soft NT ND  Extremities: No LE swelling    A/P:     32 y.o. Female with hx of laparoscopic gastric band in 2009.  Recent UGI shows band that is too tight and also correlates with her symptoms.  Plan is to remove all fluid today from her band.  We also discussed anti obesity medications and agreed to start phentermine and topiramate.  I will see her again in 1 month to follow up on lap band and if UGI looks good we can start to add fluid back to her band and I will also f/u with her medications.     Start phentermine 15 mg    Start topiramate ramp to 50 mg     Removal all fluid from band due to tight band and concentric dilatation.    See Rubi Morris in 1 months    Upper GI in 4 weeks    See RD in 1 month      Procedure details: Patient was positioned in the supine position on the procedure table and port was palpated " under the skin. Skin was prepped with an alcohol pad and a 20 gauge non coring del toro needle was used to access port.  5cc was removed from the band and needle was removed. Patient was able to drink a glass of water after the adjustment without difficulties.        MEDICATION STARTED AT THIS APPOINTMENT    We are starting Phentermine. Take one tablet in the morning.  Call the nurse at [phone] if you have any questions or concerns. (Do not stop taking it if you don't think it's working. For some people it works without them knowing it.)    Phentermine is being prescribed because you identified hunger as one of the main causes for your extra weight.      Our patients on Phentermine find that they:    >feel less hunger    >find it easier to push the plate away   >have an easier time eating less    For some of our patients, these feelings are very real and immediate. For other patients, the feelings are less obvious. They don't feel much of a change but find they've lost weight. Like all weight loss medications, Phentermine  works best when you help it work. This means:  1. Having less tempting high calorie (fattening) food around the house or office. (For people with strong cravings this is very important.)   2. Staying away from situations or people that may trigger your cravings .   3. Eating out only one time or less each week.  4. Eating your meals at a table with the TV or computer off.    Side-effects. Phentermine is generally well tolerated. The main side-effects we see are feelings of racing pulse or rapid heart beat. Some people can get an elevated blood pressure. Because of this we may have you come back within a week or so of starting the medication for a blood pressure check.         In order to get refills of this or any medication we prescribe you must be seen in the medical weight mgmt clinic every 2-3 months. Please have your pharmacy fax a refill request to [phone].      MEDICATION STARTED AT  THIS APPOINTMENT  We are starting topiramate at bedtime.  Start one tab, 25 mg, for a week. Go up to 50 mg (2 tabs) for the next week. At the third week, take   3 tabs (75 mg).  Stay at 3 tabs until you are seen again. Call the nurse at [phone] if you have any questions or concerns. (Do not stop taking it if you don't think it's working. For some people it works even though they do not feel much different.)    Topiramate (Topamax) is a medication that is used most often to treat migraine headaches or for seizures. It has also been found to help with weight loss. Although it's not currently FDA approved for weight loss, it has been used safely for a number of years to help people who are carrying extra weight.     Just how topiramate helps with weight loss has not been exactly determined. However it seems to work on areas of the brain to quiet down signals related to eating.      Topiramate may make you:    >feel less interest in eating in between meals   >think less about food and eating   >find it easier to push the plate away   >find giving up pop easier    >have an easier time eating less    For some of our patients, the pills work right away. They feel and think quite differently about food. Other patients don't feel much of a change but find in fact they have lost weight! Like all weight loss medications, topiramate works best when you help it work.  This means:    1) Have less tempting high calorie (fattening) food around the house or office    2) Have lower calorie food (fruits, vegetables,low fat meats and dairy) for snacks    3) Eat out only one time or less each week.   4) Eat your meals at a table with the TV or computer off.    Side-effects. Topiramate is generally well tolerated. The main side-effects we see are:   Tingling in hands,feet, or face (usually not very troublesome)   Mental confusion and word finding trouble (about 10% of patients have this.)     Feeling sleepy or a bit dopey- this goes  away very soon after starting.    One of the dangers of topiramate is the possibility of birth defects--if you get pregnant when you are on it, there is the risk that your baby will be born with a cleft lip or palate.  If you are on topiramate and of child bearing age, you need to be on a reliable form of birth control or refrain from sexual intercourse.     Please refer to the pharmacy insert for more information on side-effects. Since many pharmacists are not familiar with the use of topiramate in weight loss, calling the clinic will get you the most accurate information on the use of this medication for weight loss.     In order to get refills of this or any medication we prescribe you must be seen in the medical weight mgmt clinic every 2-3 months. Please have your pharmacy fax a refill request to [phone].          Sincerely,    Rubi Morris PA-C    I spent a total of 40 minutes face to face with Malika during today's office visit. Over 50% of this time was spent counseling the patient and/or coordinating care.

## 2018-03-26 ENCOUNTER — DOCUMENTATION ONLY (OUTPATIENT)
Dept: ENDOCRINOLOGY | Facility: CLINIC | Age: 33
End: 2018-03-26

## 2018-03-26 NOTE — PROGRESS NOTES
Received fax from [employer] stating that PA for Phentermine is APPROVED from 3/23/18-9/19/18.

## 2018-04-16 ENCOUNTER — TELEPHONE (OUTPATIENT)
Dept: ENDOCRINOLOGY | Facility: CLINIC | Age: 33
End: 2018-04-16

## 2018-04-16 ENCOUNTER — TELEPHONE (OUTPATIENT)
Dept: SURGERY | Facility: CLINIC | Age: 33
End: 2018-04-16

## 2018-04-16 NOTE — TELEPHONE ENCOUNTER
I called pt back.  Orders have been in EMR since clinic appointment. Has number to call to schedule appt.

## 2018-04-16 NOTE — TELEPHONE ENCOUNTER
Health Call Center    Phone Message    May a detailed message be left on voicemail: yes    Reason for Call: Medication Refill Request    Has the patient contacted the pharmacy for the refill? Yes   Name of medication being requested: Both phentemine and topiramate  Provider who prescribed the medication: Rubi Morris  Pharmacy: [facility], [id number] [place] [address] Phone: [phone] Fax: [phone]  Date medication is needed: Before end of week         Action Taken: Message routed to:  [facility] - Bariatric Surgery

## 2018-04-16 NOTE — TELEPHONE ENCOUNTER
"[facility] Call Center    Phone Message    May a detailed message be left on voicemail: yes    Reason for Call: Order(s): Other:   Reason for requested: Pt called in to request to schedule an \"upper GI endoscopy\". She states her provider in bariatrics, Dr. Morris, had requested this. Spoke with endoscopy scheduling, they are needing orders for the procedure. Please advise when available; pt requests a call when orders are placed.  Date needed: ASAP  Provider name: Dr. Rubi Morris      Action Taken: Message routed to:  [facility]    "

## 2018-04-17 ENCOUNTER — RADIANT APPOINTMENT (OUTPATIENT)
Dept: GENERAL RADIOLOGY | Facility: CLINIC | Age: 33
End: 2018-04-17
Attending: PHYSICIAN ASSISTANT
Payer: COMMERCIAL

## 2018-04-17 DIAGNOSIS — Z98.84 STATUS POST GASTRIC BANDING: ICD-10-CM

## 2018-04-17 NOTE — TELEPHONE ENCOUNTER
Left message for patient to inform her that she has refills available on her Topiramate and Phentermine. Called pharmacy to verify this. She can  Phentermine after 4/19.

## 2018-04-18 ENCOUNTER — OFFICE VISIT (OUTPATIENT)
Dept: OBGYN | Facility: CLINIC | Age: 33
End: 2018-04-18
Attending: OBSTETRICS & GYNECOLOGY
Payer: COMMERCIAL

## 2018-04-18 VITALS
HEART RATE: 77 BPM | WEIGHT: 236 LBS | SYSTOLIC BLOOD PRESSURE: 123 MMHG | DIASTOLIC BLOOD PRESSURE: 80 MMHG | BODY MASS INDEX: 33.79 KG/M2 | HEIGHT: 70 IN

## 2018-04-18 DIAGNOSIS — Z30.2 ENCOUNTER FOR STERILIZATION: Primary | ICD-10-CM

## 2018-04-18 PROCEDURE — G0463 HOSPITAL OUTPT CLINIC VISIT: HCPCS | Mod: ZF

## 2018-04-18 NOTE — MR AVS SNAPSHOT
After Visit Summary   4/18/2018    Malika So    MRN: [id number]           Patient Information     Date Of Birth          1985        Visit Information        Provider Department      4/18/2018 11:00 AM Celeste Mai MD [facility]        Today's Diagnoses     Encounter for sterilization    -  1       Follow-ups after your visit        Your next 10 appointments already scheduled     Apr 26, 2018  2:15 PM CDT   (Arrive by 2:00 PM)   RETURN BARIATRIC SURGERY with Rubi Morris PA-C   [facility] ([facility])    [address] [place] [id number] [phone]              Who to contact     Please call your clinic at [phone] to:    Ask questions about your health    Make or cancel appointments    Discuss your medicines    Learn about your test results    Speak to your doctor            Additional Information About Your Visit        [employer] Information     [employer] gives you secure access to your electronic health record. If you see a primary care provider, you can also send messages to your care team and make appointments. If you have questions, please call your primary care clinic.  If you do not have a primary care provider, please call [phone] and they will assist you.      [employer] is an electronic gateway that provides easy, online access to your medical records. With [employer], you can request a clinic appointment, read your test results, renew a prescription or communicate with your care team.     To access your existing account, please contact your [facility] or call [phone] for assistance.        Care EveryWhere ID     This is your Care EveryWhere ID. This could be used by other organizations to access your [facility] medical records  [id number]        Your Vitals Were     Pulse Height Breastfeeding? BMI (Body Mass Index)  "         77 1.778 m (5' 10\") No 33.86 kg/m2         Blood Pressure from Last 3 Encounters:   04/18/18 123/80   03/23/18 124/77   11/21/17 131/83    Weight from Last 3 Encounters:   04/18/18 107 kg (236 lb)   03/23/18 111.6 kg (246 lb 1.6 oz)   03/16/18 114 kg (251 lb 4.8 oz)              We Performed the Following     Amelia-Operative Worksheet (Laparoscopic Tubal Ligation)        Primary Care Provider Office Phone # Fax #    Josefina Welch [phone]       [address] [facility] [address]        Equal Access to Services     [facility]: Hadii lida pleitezo Soalyssa, waaxda luqadaha, qaybta kaalmada adeegyada, brigitte morton . So [facility] [phone].    ATENCIÓN: Si habla español, tiene a rowe disposición servicios gratuitos de asistencia lingüística. Llame al [phone].    We comply with applicable federal civil rights laws and [place] laws. We do not discriminate on the basis of race, color, national origin, age, disability, sex, sexual orientation, or gender identity.            Thank you!     Thank you for choosing [facility]  for your care. Our goal is always to provide you with excellent care. Hearing back from our patients is one way we can continue to improve our services. Please take a few minutes to complete the written survey that you may receive in the mail after your visit with us. Thank you!             Your Updated Medication List - Protect others around you: Learn how to safely use, store and throw away your medicines at www.disposemymeds.org.          This list is accurate as of 4/18/18  4:07 PM.  Always use your most recent med list.                   Brand Name Dispense Instructions for use Diagnosis    levonorgestrel 20 MCG/24HR IUD    MIRENA     1 each by Intrauterine route once        phentermine 15 MG capsule     30 capsule    Take 1 capsule (15 mg) by mouth every morning    Class 2 obesity with body mass index (BMI) " of 36.0 to 36.9 in adult, unspecified obesity type, unspecified whether serious comorbidity present       ranitidine 150 MG tablet    ZANTAC     Take 1 tablet (150 mg) by mouth At Bedtime        sertraline 100 MG tablet    ZOLOFT    180 tablet    Take 2 tablets (200 mg) by mouth daily    Anxiety       topiramate 25 MG tablet    TOPAMAX    90 tablet    25mg at bedtime for week 1, 50mg at bedtime for week 2 and thereafter    Class 2 obesity with body mass index (BMI) of 36.0 to 36.9 in adult, unspecified obesity type, unspecified whether serious comorbidity present

## 2018-04-18 NOTE — LETTER
"2018       RE: Malika Duarte  [address]  [place] [address]     Dear Colleague,    Thank you for referring your patient, Malika Duarte, to the [facility] at [facility]. Please see a copy of my visit note below.    L&D History and Physical   2018  Malika Duarte  [id number]      HPI: Malika Duarte is a 32 year old  who presents today to discuss permanent sterilization, specifically with bilateral salpingectomy.    Very sure that she wants a salpingectomy/sterilization. 1st pregnancy early, 2nd pregnancy \"from hell\" that included a low transverse c/s and a regaining of the weight she had lost after her lap-band procedure in . Wants no chance of accidental pregnancy. Doesn't like Mirena- gets abdominal pains occasionally, irregular/unpredictable periods, spotting in between periods. Has heard stories of friends with unplanned pregnancies and does not want one.     Menarche at 10 y/o. Periods were regular, \"heavy\" (3-4 tampons/day). Took OCP's from age [age] and had lighter periods. Nuvaring from [age] y/o worked well, light regular periods. Had Mirena since .     Denies: fever, chills, SOB, chest pain, palpitations, N/V, headache, vision changes.    OBHX:   Obstetric History       T1      L2     SAB0   TAB0   Ectopic0   Multiple0   Live Births2       # Outcome Date GA Lbr Zafar/2nd Weight Sex Delivery Anes PTL Lv   2 Term 16 39w0d  3.09 kg (6 lb 13 oz) F CS-LTranv Spinal N TIM      Apgar1:  9                Apgar5: 9   1  13 36w1d 08:30 / 01:13 2.977 kg (6 lb 9 oz) M Vag-Spont EPI N TIM      Name: JAILYN DUARTE      Apgar1:  9                Apgar5: 9          MedicalHX:   Past Medical History:   Diagnosis Date     Anxiety      GERD without esophagitis      Hypertension     prior to weight loss     Latent tuberculosis     neg chest x-ray. " treated with INH for 9 moniths     Mild major depression (H)     celexa 100 mg      Morbid obesity (H)        SurgicalHX:   Past Surgical History:   Procedure Laterality Date      SECTION N/A 2016    Procedure:  SECTION;  Surgeon: Kelli Osullivan MD;  Location:  L+D     CHOLECYSTECTOMY, LAPOROSCOPIC       LAP ADJUSTABLE GASTRIC BAND      [facility] lost over 150 lb      LASIK       TONSILLECTOMY         Medications:     Current Outpatient Prescriptions on File Prior to Visit:  levonorgestrel (MIRENA) 20 MCG/24HR IUD 1 each by Intrauterine route once   phentermine 15 MG capsule Take 1 capsule (15 mg) by mouth every morning   ranitidine (ZANTAC) 150 MG tablet Take 1 tablet (150 mg) by mouth At Bedtime   sertraline (ZOLOFT) 100 MG tablet Take 2 tablets (200 mg) by mouth daily   topiramate (TOPAMAX) 25 MG tablet 25mg at bedtime for week 1, 50mg at bedtime for week 2 and thereafter     Current Facility-Administered Medications on File Prior to Visit:  [COMPLETED] barium sulfate (EZ PAQUE) oral suspension 96% 25 mL     Daily Multivitamin    Allergies:  Allergies   Allergen Reactions     Adhesive Tape      Fenugreek [Trigonella Foenum-Graecum] Hives     Ragweeds Hives       FamilyHX:  Family History   Problem Relation Age of Onset     C.A.D. 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[employer]     Social History Main Topics     Smoking status: Never Smoker     Smokeless tobacco: Never Used     Alcohol use No     Drug use: No     Sexual activity: Yes     Partners: Male  "    Birth control/ protection: IUD      Comment: Mirena     Other Topics Concern      Service No     Blood Transfusions No     Caffeine Concern No     Occupational Exposure Yes     Hobby Hazards No     Sleep Concern Yes     takes trazadone for sleep     Stress Concern Yes     Weight Concern Yes     Back Care No     Exercise Yes     3 times per week     Bike Helmet Yes     Seat Belt Yes     Self-Exams Yes     Parent/Sibling W/ Cabg, Mi Or Angioplasty Before 65f 55m? No     Social History Narrative    Caffeine intake/servings daily - 0    Calcium intake/servings daily - 3    Exercise 5 times weekly - describe ; walks    Sunscreen used - Yes    Seatbelts used - Yes    Guns stored in the home - No    Self Breast Exam - Yes    Pap test up to date -  Yes    Eye exam up to date -  Yes    Dental exam up to date -  Yes    DEXA scan up to date -  No    Flex Sig/Colonoscopy up to date -  No    Mammography up to date -  No    Immunizations reviewed and up to date - Yes    Abuse: Current or Past (Physical, Sexual or Emotional) - No    Do you feel safe in your environment - Yes    Do you cope well with stress - No    Do you suffer from insomnia - No    Last updated by: Cindy James  6/4/2015                 ROS:   10-point ROS negative except as per HPI above.       Discussed bilateral salpingectomy vs. Hysterectomy, including complications, recovery time, incision sites, and risks/benefits considering hx of morbid obesity and lab-band procedure. Also congratulated her on her weight loss achievements (~150lb in past year). Discussed leaving Mirena in for menstrual bleeding control and endometrial protection as well.      Physical Exam:  Patient Vitals for the past 24 hrs:   BP Pulse Height Weight   04/18/18 1102 123/80 77 1.778 m (5' 10\") 107 kg (236 lb)     General: NAD, appears generally well  Abdomen: soft, obese, good mobility, non-tender, small light scar on LLQ, some light striae bilaterally on lateral " abdomen      Labs:     Lab Results   Component Value Date    PAP NIL 2016       Assessment: 32 year old  at here for discussing strong desire for bilateral salpingectomy for sterilization. Currently on Mirena which works well as a contraceptive but does cause some irregular uterine bleeding. Wants to have the procedure done as soon as provider is available.     Plan:  # Desire for permanent contraception  - Pt to talk with  re: bilateral salpingectomy in the next few months  - Keep Mirena IUD for now    Pt was seen and evaluated with Dr. Celeste Mai.    Pruvi Kulkarni, MS3- who acted as medical scribe  2018  11:13 AM      I agree with the PFSH and ROS as completed by the MS, except for changes made by me. The remainder of the encounter was performed by me and scribed by the MS. The scribed note accurately reflects my personal services and the decisions made by me.      Again, thank you for allowing me to participate in the care of your patient.      Sincerely,    Celeste Mai MD

## 2018-04-18 NOTE — PROGRESS NOTES
"L&D History and Physical   2018  Malika Duarte  [id number]      HPI: Malika Duarte is a 32 year old  who presents today to discuss permanent sterilization, specifically with bilateral salpingectomy.    Very sure that she wants a salpingectomy/sterilization. 1st pregnancy early, 2nd pregnancy \"from hell\" that included a low transverse c/s and a regaining of the weight she had lost after her lap-band procedure in . Wants no chance of accidental pregnancy. Doesn't like Mirena- gets abdominal pains occasionally, irregular/unpredictable periods, spotting in between periods. Has heard stories of friends with unplanned pregnancies and does not want one.     Menarche at 10 y/o. Periods were regular, \"heavy\" (3-4 tampons/day). Took OCP's from age [age] and had lighter periods. Nuvaring from [age] y/o worked well, light regular periods. Had Mirena since .     Denies: fever, chills, SOB, chest pain, palpitations, N/V, headache, vision changes.    OBHX:   Obstetric History       T1      L2     SAB0   TAB0   Ectopic0   Multiple0   Live Births2       # Outcome Date GA Lbr Zafar/2nd Weight Sex Delivery Anes PTL Lv   2 Term 16 39w0d  3.09 kg (6 lb 13 oz) F CS-LTranv Spinal N TIM      Apgar1:  9                Apgar5: 9   1  14/ 36w1d 08:30 / 01:13 2.977 kg (6 lb 9 oz) M Vag-Spont EPI N TIM      Name: JAILYN DUARTE      Apgar1:  9                Apgar5: 9          MedicalHX:   Past Medical History:   Diagnosis Date     Anxiety      GERD without esophagitis      Hypertension     prior to weight loss     Latent tuberculosis     neg chest x-ray. treated with INH for 9 moniths     Mild major depression (H)     celexa 100 mg      Morbid obesity (H)        SurgicalHX:   Past Surgical History:   Procedure Laterality Date      SECTION N/A 2016    Procedure:  SECTION;  Surgeon: Kelli Osullivan MD;  Location: [facility] L+D     " CHOLECYSTECTOMY, LAPOROSCOPIC  2010     LAP ADJUSTABLE GASTRIC BAND  2009    [facility] lost over 150 lb      LASIK  2012     TONSILLECTOMY  1995       Medications:     Current Outpatient Prescriptions on File Prior to Visit:  levonorgestrel (MIRENA) 20 MCG/24HR IUD 1 each by Intrauterine route once   phentermine 15 MG capsule Take 1 capsule (15 mg) by mouth every morning   ranitidine (ZANTAC) 150 MG tablet Take 1 tablet (150 mg) by mouth At Bedtime   sertraline (ZOLOFT) 100 MG tablet Take 2 tablets (200 mg) by mouth daily   topiramate (TOPAMAX) 25 MG tablet 25mg at bedtime for week 1, 50mg at bedtime for week 2 and thereafter     Current Facility-Administered Medications on File Prior to Visit:  [COMPLETED] barium sulfate (EZ PAQUE) oral suspension 96% 25 mL     Daily Multivitamin    Allergies:  Allergies   Allergen Reactions     Adhesive Tape      Fenugreek [Trigonella Foenum-Graecum] Hives     Ragweeds Hives       FamilyHX:  Family History   Problem Relation Age of Onset     C.A.D. Maternal Grandfather      DIABETES Maternal Grandfather      Heart Failure Paternal Grandmother      CHF     Anemia Paternal Grandmother      Obesity Other      Had GI surgery     Hypertension Other      mother's side of family     Arthritis Other      both sides of family       SocialHX:   Social History     Social History     Marital status:      Spouse name: N/A     Number of children: 2     Years of education: N/A     Occupational History     [employer]     Social History Main Topics     Smoking status: Never Smoker     Smokeless tobacco: Never Used     Alcohol use No     Drug use: No     Sexual activity: Yes     Partners: Male     Birth control/ protection: IUD      Comment: Mirena     Other Topics Concern      Service No     Blood Transfusions No     Caffeine Concern No     Occupational Exposure Yes     Hobby Hazards No     Sleep Concern Yes     takes trazadone for sleep     Stress Concern Yes     Weight  "Concern Yes     Back Care No     Exercise Yes     3 times per week     Bike Helmet Yes     Seat Belt Yes     Self-Exams Yes     Parent/Sibling W/ Cabg, Mi Or Angioplasty Before 65f 55m? No     Social History Narrative    Caffeine intake/servings daily - 0    Calcium intake/servings daily - 3    Exercise 5 times weekly - describe ; walks    Sunscreen used - Yes    Seatbelts used - Yes    Guns stored in the home - No    Self Breast Exam - Yes    Pap test up to date -  Yes    Eye exam up to date -  Yes    Dental exam up to date -  Yes    DEXA scan up to date -  No    Flex Sig/Colonoscopy up to date -  No    Mammography up to date -  No    Immunizations reviewed and up to date - Yes    Abuse: Current or Past (Physical, Sexual or Emotional) - No    Do you feel safe in your environment - Yes    Do you cope well with stress - No    Do you suffer from insomnia - No    Last updated by: Cindy James  2015                 ROS:   10-point ROS negative except as per HPI above.       Discussed bilateral salpingectomy vs. Hysterectomy, including complications, recovery time, incision sites, and risks/benefits considering hx of morbid obesity and lab-band procedure. Also congratulated her on her weight loss achievements (~150lb in past year). Discussed leaving Mirena in for menstrual bleeding control and endometrial protection as well.      Physical Exam:  Patient Vitals for the past 24 hrs:   BP Pulse Height Weight   18 1102 123/80 77 1.778 m (5' 10\") 107 kg (236 lb)     General: NAD, appears generally well  Abdomen: soft, obese, good mobility, non-tender, small light scar on LLQ, some light striae bilaterally on lateral abdomen      Labs:     Lab Results   Component Value Date    PAP NIL 2016       Assessment: 32 year old  at here for discussing strong desire for bilateral salpingectomy for sterilization. Currently on Mirena which works well as a contraceptive but does cause some irregular " uterine bleeding. Wants to have the procedure done as soon as provider is available.     Plan:  # Desire for permanent contraception  - Pt to talk with  re: bilateral salpingectomy in the next few months  - Keep Mirena IUD for now    Pt was seen and evaluated with Dr. Celeste aMi.    Purvi Kulkarni, MS3- who acted as medical scribe  4/18/2018  11:13 AM      I agree with the PFSH and ROS as completed by the MS, except for changes made by me. The remainder of the encounter was performed by me and scribed by the MS. The scribed note accurately reflects my personal services and the decisions made by me.    Celeste Mai MD, FACOG  [facility]  OB/GYN    4/18/2018  4:03 PM

## 2018-04-18 NOTE — NURSING NOTE
Chief Complaint   Patient presents with     Consult     Would like to discuss permanent birth control       See JOSE DANIEL Salinas 4/18/2018

## 2018-04-19 ENCOUNTER — TELEPHONE (OUTPATIENT)
Dept: OBGYN | Facility: CLINIC | Age: 33
End: 2018-04-19

## 2018-04-26 ENCOUNTER — OFFICE VISIT (OUTPATIENT)
Dept: SURGERY | Facility: CLINIC | Age: 33
End: 2018-04-26
Payer: COMMERCIAL

## 2018-04-26 VITALS
BODY MASS INDEX: 33.53 KG/M2 | SYSTOLIC BLOOD PRESSURE: 125 MMHG | HEIGHT: 70 IN | OXYGEN SATURATION: 97 % | TEMPERATURE: 98.4 F | HEART RATE: 77 BPM | DIASTOLIC BLOOD PRESSURE: 77 MMHG | WEIGHT: 234.2 LBS

## 2018-04-26 DIAGNOSIS — Z98.84 STATUS POST GASTRIC BANDING: Primary | ICD-10-CM

## 2018-04-26 NOTE — NURSING NOTE
"(   Chief Complaint   Patient presents with     RECHECK     RBS, discuss Upper GI results    )    ( Weight: 234 lb 3.2 oz )  ( Height: 5' 10\" )  ( BMI (Calculated): 33.67 )  ( Initial Weight: 246 lb 1.6 oz )  ( Cumulative weight loss (lbs): 11.9 )  ( Last Visits Weight: 246 lb 1.6 oz )  ( Wt change since last visit (lbs): -11.9 )  (   )  (   )    ( BP: 125/77 )  (   )  ( Temp: 98.4  F (36.9  C) )  ( Temp src: Oral )  ( Pulse: 77 )  (   )  ( SpO2: 97 % )    (   Patient Active Problem List   Diagnosis     Mild major depression (H)     Anxiety     Non-specific low back pain     Morbid obesity (H)     Bariatric surgery status     GERD without esophagitis    )  (   Current Outpatient Prescriptions   Medication Sig Dispense Refill     levonorgestrel (MIRENA) 20 MCG/24HR IUD 1 each by Intrauterine route once       phentermine 15 MG capsule Take 1 capsule (15 mg) by mouth every morning 30 capsule 3     ranitidine (ZANTAC) 150 MG tablet Take 1 tablet (150 mg) by mouth At Bedtime       sertraline (ZOLOFT) 100 MG tablet Take 2 tablets (200 mg) by mouth daily 180 tablet 1     topiramate (TOPAMAX) 25 MG tablet 25mg at bedtime for week 1, 50mg at bedtime for week 2 and thereafter 90 tablet 3    )  ( Diabetes Eval:    )    ( Pain Eval:  Data Unavailable )    ( Wound Eval:       )    (   History   Smoking Status     Never Smoker   Smokeless Tobacco     Never Used    )    ( Signed By:  Ben Clarke; April 26, 2018; 2:14 PM )    "

## 2018-04-26 NOTE — PROGRESS NOTES
Return Bariatric Surgery Note    RE: Malika So  MR#: [id number]  : 1985  VISIT DATE: 2018    Dear Josefina Welch,    I had the pleasure of seeing your patient, Malika So, in my post-bariatric surgery assessment clinic.    CHIEF COMPLAINT: Post-bariatric surgery follow-up. Lap band follow up.     HISTORY OF PRESENT ILLNESS:  Questions Regarding Prior Weight Loss Surgery Reviewed With Patient 2018   I had the following weight loss procedure: Laparoscopic Adjustable Gastric Band   What year was your surgery? 2009   How has your weight changed since your last visit? I have lost weight   Are you currently taking any weight loss medications? Yes   Do you currently have any of the following: None of the above   Have you been to the Emergency room since your last visit with us? No   Were you in the hospital since your last visit with us? No   Do you have any concerns today? no     Last visit I removed all fluid from her band due to symptoms of tight band and tight band seen on UGI. (5cc removed)  She had repeat UGI 18  She also started phentermine and topiramate 3/23/18 with me.   She has lost 12 lbs since last visit  She is nervous about adding fluid back into her band.     Weight History:     2018   What is your highest lifetime weight? 298   What is your lowest weight since surgery? (In pounds) 130     Initial Weight: 246 lb 1.6 oz  Current Weight: Weight: 234 lb 3.2 oz  Cumulative weight loss (lbs): 11.9  Last Visits Weight: 246 lb 1.6 oz    Questions Regarding Co-Morbidities and Health Concerns Reviewed With Patient 2018   Pre-diabetes: Never   Diabetes II: Never   High Blood Pressure: Stayed the same   High cholesterol: Never   Heartburn/Reflux: Gone away   Are you taking daily medication for heartburn, acid reflux, or GERD (acid reflux disease)? -   Sleep apnea: Never   PCOS: Never   Back pain: Improved   Joint pain: Improved   Lower leg swelling: Improved  "      Eating Habits 4/26/2018   How many meals do you eat per day? 3   Do you snack between meals? Sometimes   How much food are you eating at each meal? 1/2 cup to 1 cup   Are you able to separate your meals and liquids by at least 30 minutes? Yes   Are you able to avoid liquid calories? Yes       Exercise Questions Reviewed With Patient 4/26/2018   How often do you exercise? Never   What keeps you from being more active?  Pain, Lack of Time, Too tired       Social History:      4/26/2018   Are you smoking? No   Are you drinking alcohol? No       Medications:  Current Outpatient Prescriptions   Medication     levonorgestrel (MIRENA) 20 MCG/24HR IUD     phentermine 15 MG capsule     ranitidine (ZANTAC) 150 MG tablet     sertraline (ZOLOFT) 100 MG tablet     topiramate (TOPAMAX) 25 MG tablet     No current facility-administered medications for this visit.          4/26/2018   Do you avoid NSAIDs such as (Ibuprofen, Aleve, Naproxen, Advil)?   Yes       ROS:  GI:      4/26/2018   Vomiting: No   Diarrhea: Yes   Constipation: Yes   Swallowing trouble: No   Abdominal pain: Yes   Heartburn: No   Rash in skin folds: Yes   Depression: Yes   Stress urinary incontinence Yes     Skin:   BAR RBS ROS - SKIN 4/26/2018   Rash in skin folds: Yes     Psych:      4/26/2018   Depression: Yes   Anxiety: Yes     Female Only:   BAR RBS ROS - FEMALE ONLY 4/26/2018   Female only: Irregular menstrual cycles, Birth control       LABS/IMAGING/MEDICAL RECORDS REVIEW:     UGI 4/17/18    Impression: Compared with 3/19/2018 there is improved passage through  the gastric band. Upstream distal esophageal dilatation has resolved.     PHYSICAL EXAMINATION:  /77  Pulse 77  Temp 98.4  F (36.9  C) (Oral)  Ht 5' 10\"  Wt 234 lb 3.2 oz  SpO2 97%  BMI 33.6 kg/m2   General: No apparent distress  Neuro: A & O x 3  Head: Atraumatic, normocephalic  Eyes: PERRL, EOMI  Skin: warm and dry, no rashes on exposed skin  Respiratory: respirations " unlabored  Abdomen: soft NT ND  Extremities: No LE swelling      ASSESSMENT AND PLAN:      1. 9 years status laparoscopic adjustable gastric band here for MWM follow up. She has no fluid in her band.  UGI repeat looks good.  Doing well and has lost 12 more lbs on topiramate and phentermine.  She would like a small amount of fluid added to her band.  2. Morbid Obesity current BMI: Body mass index is 33.6 kg/(m^2).  3. Post surgical malabsorption:   Labs ordered per protocol.   Follow food plan per dietitian recommendations.   Continue taking recommended post-op vitamins.  4. Return to clinic in July with Rubi Morris   5. Continue phentermine and topiramate  6. Stop phentermine 2 days before tubal ligation surgery  7. Add 2 cc today to band.      Procedure details: Patient was positioned in the supine position on the procedure table and port was palpated under the skin. Skin was prepped with an alcohol pad and a 20 gauge non coring del toro needle was used to access port.  2cc was added to band and needle was removed. Patient was able to drink a glass of water after the adjustment without difficulties.    Sincerely,    Rubi Morris PA-C    I spent a total of 15 minutes face to face with Malika during today's office visit. Over 50% of this time was spent counseling the patient and/or coordinating care.

## 2018-04-26 NOTE — MR AVS SNAPSHOT
"              After Visit Summary   4/26/2018    Malika So    MRN: [id number]           Patient Information     Date Of Birth          1985        Visit Information        Provider Department      4/26/2018 2:15 PM Rubi Morris PA-C [facility] Surgical [facility]        Today's Diagnoses     Status post gastric banding    -  1       Follow-ups after your visit        Who to contact     Please call your clinic at [phone] to:    Ask questions about your health    Make or cancel appointments    Discuss your medicines    Learn about your test results    Speak to your doctor            Additional Information About Your Visit        MyChart Information     [facility] [employer] gives you secure access to your electronic health record. If you see a primary care provider, you can also send messages to your care team and make appointments. If you have questions, please call your primary care clinic.  If you do not have a primary care provider, please call [phone] and they will assist you.      [facility] [employer] is an electronic gateway that provides easy, online access to your medical records. With [facility], you can request a clinic appointment, read your test results, renew a prescription or communicate with your care team.     To access your existing account, please contact your [facility] or call [phone] for assistance.        Care EveryWhere ID     This is your Care EveryWhere ID. This could be used by other organizations to access your [facility] medical records  [id number]        Your Vitals Were     Pulse Temperature Height Pulse Oximetry BMI (Body Mass Index)       77 98.4  F (36.9  C) (Oral) 5' 10\" 97% 33.6 kg/m2        Blood Pressure from Last 3 Encounters:   04/26/18 125/77   04/18/18 123/80   03/23/18 124/77    Weight from Last 3 Encounters:   04/26/18 234 lb 3.2 oz   04/18/18 236 lb   03/23/18 246 lb 1.6 oz              Today, you had the following     No " orders found for display       Primary Care Provider Office Phone # Fax #    Josefina Welch [phone]       [address] [facility]  [facility] [address]        Equal Access to Services     FABIO HARDY : Hadronald lida tenorio pricillao Sosylvieali, waaxda luqadaha, qaybta kaalmada adeegyada, brigitte rodriguez laWillaalena preston. So [facility] [phone].    ATENCIÓN: Si habla español, tiene a rowe disposición servicios gratuitos de asistencia lingüística. Llame al [phone].    We comply with applicable federal civil rights laws and [place] laws. We do not discriminate on the basis of race, color, national origin, age, disability, sex, sexual orientation, or gender identity.            Thank you!     Thank you for choosing [facility] SURGICAL WE[facility] MANAGEMENT  for your care. Our goal is always to provide you with excellent care. Hearing back from our patients is one way we can continue to improve our services. Please take a few minutes to complete the written survey that you may receive in the mail after your visit with us. Thank you!             Your Updated Medication List - Protect others around you: Learn how to safely use, store and throw away your medicines at www.disposemymeds.org.          This list is accurate as of 4/26/18  2:38 PM.  Always use your most recent med list.                   Brand Name Dispense Instructions for use Diagnosis    levonorgestrel 20 MCG/24HR IUD    MIRENA     1 each by Intrauterine route once        phentermine 15 MG capsule     30 capsule    Take 1 capsule (15 mg) by mouth every morning    Class 2 obesity with body mass index (BMI) of 36.0 to 36.9 in adult, unspecified obesity type, unspecified whether serious comorbidity present       ranitidine 150 MG tablet    ZANTAC     Take 1 tablet (150 mg) by mouth At Bedtime        sertraline 100 MG tablet    ZOLOFT    180 tablet    Take 2 tablets (200 mg) by mouth daily    Anxiety       topiramate 25 MG tablet    TOPAMAX    90  tablet    25mg at bedtime for week 1, 50mg at bedtime for week 2 and thereafter    Class 2 obesity with body mass index (BMI) of 36.0 to 36.9 in adult, unspecified obesity type, unspecified whether serious comorbidity present

## 2018-04-27 ENCOUNTER — TELEPHONE (OUTPATIENT)
Dept: OBGYN | Facility: CLINIC | Age: 33
End: 2018-04-27

## 2018-04-27 NOTE — TELEPHONE ENCOUNTER
Confirmed surgery date with patient 5/9/18 with arrival time at 6:00a.m with nothing to eat eight hours before scheduled surgery time and clear liquids up to two hours before scheduled surgery time, informed patient that she will need to sched a pre op physical and that a map and letter will be mailed out.     to complete the following fields:            CHECKLIST     Google Calendar : Yes     Resident notified: Not Applicable     Clinic schedule blocked:  Not Applicable    Patient notified:Yes      Pre op information sent: Yes     Given to patient over the phone.Yes    Comments:

## 2018-05-01 NOTE — PROGRESS NOTES
[facility]  [address] [place] [id number]  [phone]  Dept: [phone]    PRE-OP EVALUATION:  Today's date: 2018    Malika So (: 1985) presents for pre-operative evaluation assessment as requested by Dr. Mai.  She requires evaluation and anesthesia risk assessment prior to undergoing surgery/procedure for treatment of Bilateral Laparoscopic Salpingectomy  .    Fax number for surgical facility: [facility] FAX# :[phone]     Primary Physician: Josefina Welch      Patient has a Health Care Directive or Living Will:  NO    Preop Questions 2018   Who is doing your surgery? Dr. Mai   What are you having done? Fallopean tubes removed   Date of Surgery/Procedure: 18   Facility or Hospital where procedure/surgery will be performed: [facility]   1.  Do you have a history of Heart attack, stroke, stent, coronary bypass surgery, or other heart surgery? No   2.  Do you ever have any pain or discomfort in your chest? No   3.  Do you have a history of  Heart Failure? No   4.   Are you troubled by shortness of breath when:  walking on a level surface, or up a slight hill, or at night? No   5.  Do you currently have a cold, bronchitis or other respiratory infection? No   6.  Do you have a cough, shortness of breath, or wheezing? No   7.  Do you sometimes get pains in the calves of your legs when you walk? No   8. Do you or anyone in your family have previous history of blood clots? No   9.  Do you or does anyone in your family have a serious bleeding problem such as prolonged bleeding following surgeries or cuts? No   10. Have you ever had problems with anemia or been told to take iron pills? No   11. Have you had any abnormal blood loss such as black, tarry or bloody stools, or abnormal vaginal bleeding? No   12. Have you ever had a blood transfusion? No   13. Have you or any of your relatives ever had problems with  anesthesia? No   14. Do you have sleep apnea, excessive snoring or daytime drowsiness? No   15. Do you have any prosthetic heart valves? No   16. Do you have prosthetic joints? No   17. Is there any chance that you may be pregnant? No         HPI:     HPI related to upcoming procedure: Pt scheduled for above surgery for Permanent contraception.  Pt is           MEDICAL HISTORY:     Patient Active Problem List    Diagnosis Date Noted     Bariatric surgery status 2017     Priority: Medium     Morbid obesity (H)      Priority: Medium     GERD without esophagitis      Priority: Medium     Non-specific low back pain 2015     Priority: Medium     Mild major depression (H) 2011     Priority: Medium     Anxiety 2011     Priority: Medium      Past Medical History:   Diagnosis Date     Anxiety      Depressive disorder      Esophageal reflux      GERD without esophagitis      Hypertension     prior to weight loss     Latent tuberculosis     neg chest x-ray. treated with INH for 9 moniths     Mild major depression (H)     celexa 100 mg      Morbid obesity (H)      Urinary incontinence      Past Surgical History:   Procedure Laterality Date     C EACH ADD TOOTH EXTRACTION      multiple tooth extractions      SECTION N/A 2016    Procedure:  SECTION;  Surgeon: Kelli Osullivan MD;  Location: [facility] L+D     CHOLECYSTECTOMY, LAPOROSCOPIC       LAP ADJUSTABLE GASTRIC BAND      [facility] lost over 150 lb      LASIK       TONSILLECTOMY       Current Outpatient Prescriptions   Medication Sig Dispense Refill     levonorgestrel (MIRENA) 20 MCG/24HR IUD 1 each by Intrauterine route once       phentermine 15 MG capsule Take 1 capsule (15 mg) by mouth every morning 30 capsule 3     sertraline (ZOLOFT) 100 MG tablet Take 2 tablets (200 mg) by mouth daily 180 tablet 1     topiramate (TOPAMAX) 25 MG tablet 25mg at bedtime for week 1, 50mg at bedtime for week 2 and thereafter 90 tablet  3     ranitidine (ZANTAC) 150 MG tablet Take 1 tablet (150 mg) by mouth At Bedtime       OTC products: None, except as noted above    Allergies   Allergen Reactions     Adhesive Tape      blisters     Fenugreek [Trigonella Foenum-Graecum] Hives     Ragweeds Hives      Latex Allergy: NO    Social History   Substance Use Topics     Smoking status: Never Smoker     Smokeless tobacco: Never Used     Alcohol use No     History   Drug Use No     Social History     Social History     Marital status:      Spouse name: N/A     Number of children: 2     Years of education: N/A     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[employer]     Social History Main Topics     Smoking status: Never Smoker     Smokeless tobacco: Never Used     Alcohol use No     Drug use: No     Sexual activity: Yes     Partners: Male     Birth control/ protection: IUD      Comment: Mirena     Other Topics Concern      Service No     Blood Transfusions No     Caffeine Concern No     Occupational Exposure Yes     Hobby Hazards No     Sleep Concern Yes     takes trazadone for sleep     Stress Concern Yes     Weight Concern Yes     Back Care No     Exercise Yes     3 times per week     Bike Helmet Yes     Seat Belt Yes     Self-Exams Yes     Parent/Sibling W/ Cabg, Mi Or Angioplasty Before 65f 55m? No     Social History Narrative    Caffeine intake/servings daily - 0    Calcium intake/servings daily - 3    Exercise 5 times weekly - describe ; walks    Sunscreen used - Yes    Seatbelts used - Yes    Guns stored in the home - No    Self Breast Exam - Yes    Pap test up to date -  Yes    Eye exam up to date -  Yes    Dental exam up to date -  Yes    DEXA scan up to date -  No    Flex Sig/Colonoscopy up to date -  No    Mammography up to date -  No    Immunizations reviewed and up to date - Yes    Abuse: Current or Past (Physical, Sexual or Emotional) - No    Do you feel safe in your environment - Yes    Do you cope well with stress - No    Do you  "suffer from insomnia - No    Last updated by: Cindy James  6/4/2015                 Family History   Problem Relation Age of Onset     C.A.D. Maternal Grandfather      DIABETES Maternal Grandfather      Hypertension Maternal Grandfather      Obesity Maternal Grandfather      Hyperlipidemia Maternal Grandfather      Coronary Artery Disease Maternal Grandfather      Colon Polyps Maternal Grandfather      Heart Failure Paternal Grandmother      CHF     Anemia Paternal Grandmother      Obesity Maternal Grandmother      Other Cancer Maternal Grandmother      Obesity Other      Had GI surgery     Hypertension Other      mother's side of family     Arthritis Other      both sides of family     Hypertension Other      Obesity Other          REVIEW OF SYSTEMS:   CONSTITUTIONAL: NEGATIVE for fever, chills, change in weight  INTEGUMENTARY/SKIN: NEGATIVE for worrisome rashes, moles or lesions  EYES: NEGATIVE for vision changes or irritation  ENT/MOUTH: NEGATIVE for ear, mouth and throat problems  RESP: NEGATIVE for significant cough or SOB  BREAST: NEGATIVE for masses, tenderness or discharge  CV: NEGATIVE for chest pain, palpitations or peripheral edema  GI: NEGATIVE for nausea, abdominal pain, heartburn, or change in bowel habits  : NEGATIVE for frequency, dysuria, or hematuria  MUSCULOSKELETAL: NEGATIVE for significant arthralgias or myalgia  NEURO: NEGATIVE for weakness, dizziness or paresthesias  ENDOCRINE: NEGATIVE for temperature intolerance, skin/hair changes  HEME: NEGATIVE for bleeding problems  PSYCHIATRIC: NEGATIVE for changes in mood or affect    EXAM:   /78  Pulse 73  Temp 96.9  F (36.1  C) (Oral)  Resp 18  Ht 5' 10\" (1.778 m)  Wt 229 lb 9.6 oz (104.1 kg)  SpO2 98%  BMI 32.94 kg/m2    GENERAL APPEARANCE: healthy, alert and no distress     EYES: EOMI, PERRL     HENT: ear canals and TM's normal and nose and mouth without ulcers or lesions     NECK: no adenopathy, no asymmetry, masses, " or scars and thyroid normal to palpation     RESP: lungs clear to auscultation - no rales, rhonchi or wheezes     CV: regular rates and rhythm, normal S1 S2, no S3 or S4 and no murmur, click or rub     ABDOMEN:  soft, nontender, no HSM or masses and bowel sounds normal     MS: extremities normal- no gross deformities noted, no evidence of inflammation in joints, FROM in all extremities.     SKIN: no suspicious lesions or rashes     NEURO: Normal strength and tone, sensory exam grossly normal, mentation intact and speech normal     PSYCH: mentation appears normal. and affect normal/bright     LYMPHATICS: No cervical adenopathy    DIAGNOSTICS:     Labs Drawn and in Process:   Unresulted Labs Ordered in the Past 30 Days of this Admission     No orders found from 3/5/2018 to 5/5/2018.          Recent Labs   Lab Test  03/16/18   0904  01/06/16   0704  01/04/16   1028   07/20/10   1153   HGB  14.6  10.5*  13.0   < >  13.1   PLT  261   --   264   < >  233   INR   --    --    --    --   1.04   NA  140   --    --    --   143   POTASSIUM  4.2   --    --    --   4.7   CR  0.68   --    --    --   0.74    < > = values in this interval not displayed.        IMPRESSION:   Reason for surgery/procedure: as above  Diagnosis/reason for consult: Preop    The proposed surgical procedure is considered LOW risk.    REVISED CARDIAC RISK INDEX  The patient has the following serious cardiovascular risks for perioperative complications such as (MI, PE, VFib and 3  AV Block):  No serious cardiac risks  INTERPRETATION: 0 risks: Class I (very low risk - 0.4% complication rate)    The patient has the following additional risks for perioperative complications:  No identified additional risks      ICD-10-CM    1. Preop general physical exam Z01.818 Hemoglobin     Beta HCG Qual, Urine - [facility] [place] ([id number])     CANCELED: HCG Qual, Urine - [facility],  [place]  ([id number])   2. Mild major depression (H) F32.0    3. Anxiety F41.9    4.  Bariatric surgery status Z98.84    5. Morbid obesity due to excess calories (H) E66.01        RECOMMENDATIONS:       APPROVAL GIVEN to proceed with proposed procedure, without further diagnostic evaluation       Signed Electronically by: Benita Kennedy MD    Copy of this evaluation report is provided to requesting physician.    [facility] Preop Guidelines    Revised Cardiac Risk Index

## 2018-05-01 NOTE — PATIENT INSTRUCTIONS
Before Your Surgery      Call your surgeon if there is any change in your health. This includes signs of a cold or flu (such as a sore throat, runny nose, cough, rash or fever).    Do not smoke, drink alcohol or take over the counter medicine (unless your surgeon or primary care doctor tells you to) for the 24 hours before and after surgery.    If you take prescribed drugs: Follow your doctor s orders about which medicines to take and which to stop until after surgery.    Eating and drinking prior to surgery: follow the instructions from your surgeon    Take a shower or bath the night before surgery. Use the soap your surgeon gave you to gently clean your skin. If you do not have soap from your surgeon, use your regular soap. Do not shave or scrub the surgery site.  Wear clean pajamas and have clean sheets on your bed.     [facility]    If you have any questions regarding to your visit please contact your care team:       Team Red:   Clinic Hours Telephone Number   Dr. Josefina Steel, NP   7am-7pm  Monday - Thursday   7am-5pm  Fridays  [phone]  (Appointment scheduling available 24/7)    Questions about your recent visit?   Team Line  [phone]   Urgent Care - [place] and [facility] - 11am-9pm Monday-Friday Saturday-Sunday- 9am-5pm   [place] - 5pm-9pm Monday-Friday Saturday-Sunday- 9am-5pm  [phone] - [place]  [phone] - [place]       What options do I have for a visit other than an office visit? We offer electronic visits (e-visits) and telephone visits, when medically appropriate.  Please check with your medical insurance to see if these types of visits are covered, as you will be responsible for any charges that are not paid by your insurance.      You can use [email or web address] (secure electronic communication) to access to your chart, send your primary care provider a message, or make an appointment. Ask a team  member how to get started.     For a price quote for your services, please call our Consumer Price Line at [phone] or our Imaging Cost estimation line at [phone] (for imaging tests).      Discharged by: Cydney.

## 2018-05-04 ENCOUNTER — OFFICE VISIT (OUTPATIENT)
Dept: FAMILY MEDICINE | Facility: CLINIC | Age: 33
End: 2018-05-04
Payer: COMMERCIAL

## 2018-05-04 VITALS
WEIGHT: 229.6 LBS | SYSTOLIC BLOOD PRESSURE: 116 MMHG | DIASTOLIC BLOOD PRESSURE: 78 MMHG | TEMPERATURE: 96.9 F | BODY MASS INDEX: 32.87 KG/M2 | RESPIRATION RATE: 18 BRPM | HEIGHT: 70 IN | OXYGEN SATURATION: 98 % | HEART RATE: 73 BPM

## 2018-05-04 DIAGNOSIS — Z01.818 PREOP GENERAL PHYSICAL EXAM: Primary | ICD-10-CM

## 2018-05-04 DIAGNOSIS — F32.0 MILD MAJOR DEPRESSION (H): ICD-10-CM

## 2018-05-04 DIAGNOSIS — E66.01 MORBID OBESITY DUE TO EXCESS CALORIES (H): ICD-10-CM

## 2018-05-04 DIAGNOSIS — F41.9 ANXIETY: ICD-10-CM

## 2018-05-04 DIAGNOSIS — Z98.84 BARIATRIC SURGERY STATUS: ICD-10-CM

## 2018-05-04 LAB
BETA HCG QUAL IFA URINE: NEGATIVE
HGB BLD-MCNC: 14.2 G/DL (ref 11.7–15.7)

## 2018-05-04 PROCEDURE — 84703 CHORIONIC GONADOTROPIN ASSAY: CPT | Performed by: FAMILY MEDICINE

## 2018-05-04 PROCEDURE — 99214 OFFICE O/P EST MOD 30 MIN: CPT | Performed by: FAMILY MEDICINE

## 2018-05-04 PROCEDURE — 85018 HEMOGLOBIN: CPT | Performed by: FAMILY MEDICINE

## 2018-05-04 PROCEDURE — 36415 COLL VENOUS BLD VENIPUNCTURE: CPT | Performed by: FAMILY MEDICINE

## 2018-05-05 ASSESSMENT — PATIENT HEALTH QUESTIONNAIRE - PHQ9: SUM OF ALL RESPONSES TO PHQ QUESTIONS 1-9: 12

## 2018-05-08 ENCOUNTER — ANESTHESIA EVENT (OUTPATIENT)
Dept: SURGERY | Facility: CLINIC | Age: 33
End: 2018-05-08
Payer: COMMERCIAL

## 2018-05-09 ENCOUNTER — HOSPITAL ENCOUNTER (OUTPATIENT)
Facility: CLINIC | Age: 33
Discharge: HOME | End: 2018-05-09
Attending: OBSTETRICS & GYNECOLOGY | Admitting: OBSTETRICS & GYNECOLOGY
Payer: COMMERCIAL

## 2018-05-09 ENCOUNTER — SURGERY (OUTPATIENT)
Age: 33
End: 2018-05-09

## 2018-05-09 ENCOUNTER — ANESTHESIA (OUTPATIENT)
Dept: SURGERY | Facility: CLINIC | Age: 33
End: 2018-05-09
Payer: COMMERCIAL

## 2018-05-09 VITALS
WEIGHT: 225.53 LBS | SYSTOLIC BLOOD PRESSURE: 105 MMHG | HEART RATE: 64 BPM | OXYGEN SATURATION: 96 % | TEMPERATURE: 98.2 F | HEIGHT: 70 IN | RESPIRATION RATE: 16 BRPM | DIASTOLIC BLOOD PRESSURE: 63 MMHG | BODY MASS INDEX: 32.29 KG/M2

## 2018-05-09 DIAGNOSIS — Z90.79 STATUS POST BILATERAL SALPINGECTOMY: Primary | ICD-10-CM

## 2018-05-09 LAB
ABO + RH BLD: NORMAL
ABO + RH BLD: NORMAL
BLD GP AB SCN SERPL QL: NORMAL
BLOOD BANK CMNT PATIENT-IMP: NORMAL
ERYTHROCYTE [DISTWIDTH] IN BLOOD BY AUTOMATED COUNT: 14.7 % (ref 10–15)
GLUCOSE SERPL-MCNC: 81 MG/DL (ref 70–99)
HCG SERPL QL: NEGATIVE
HCT VFR BLD AUTO: 40.4 % (ref 35–47)
HGB BLD-MCNC: 13.7 G/DL (ref 11.7–15.7)
MCH RBC QN AUTO: 29.8 PG (ref 26.5–33)
MCHC RBC AUTO-ENTMCNC: 33.9 G/DL (ref 31.5–36.5)
MCV RBC AUTO: 88 FL (ref 78–100)
PLATELET # BLD AUTO: 235 10E9/L (ref 150–450)
RBC # BLD AUTO: 4.6 10E12/L (ref 3.8–5.2)
SPECIMEN EXP DATE BLD: NORMAL
WBC # BLD AUTO: 6.5 10E9/L (ref 4–11)

## 2018-05-09 PROCEDURE — 85027 COMPLETE CBC AUTOMATED: CPT | Performed by: OBSTETRICS & GYNECOLOGY

## 2018-05-09 PROCEDURE — C9290 INJ, BUPIVACAINE LIPOSOME: HCPCS

## 2018-05-09 PROCEDURE — 82947 ASSAY GLUCOSE BLOOD QUANT: CPT | Performed by: OBSTETRICS & GYNECOLOGY

## 2018-05-09 PROCEDURE — 71000027 ZZH RECOVERY PHASE 2 EACH 15 MINS: Performed by: OBSTETRICS & GYNECOLOGY

## 2018-05-09 PROCEDURE — 25000128 H RX IP 250 OP 636

## 2018-05-09 PROCEDURE — 25000128 H RX IP 250 OP 636: Performed by: ANESTHESIOLOGY

## 2018-05-09 PROCEDURE — 84703 CHORIONIC GONADOTROPIN ASSAY: CPT | Performed by: OBSTETRICS & GYNECOLOGY

## 2018-05-09 PROCEDURE — 25000132 ZZH RX MED GY IP 250 OP 250 PS 637: Performed by: OBSTETRICS & GYNECOLOGY

## 2018-05-09 PROCEDURE — 27210794 ZZH OR GENERAL SUPPLY STERILE: Performed by: OBSTETRICS & GYNECOLOGY

## 2018-05-09 PROCEDURE — 37000009 ZZH ANESTHESIA TECHNICAL FEE, EACH ADDTL 15 MIN: Performed by: OBSTETRICS & GYNECOLOGY

## 2018-05-09 PROCEDURE — 86850 RBC ANTIBODY SCREEN: CPT | Performed by: OBSTETRICS & GYNECOLOGY

## 2018-05-09 PROCEDURE — 86901 BLOOD TYPING SEROLOGIC RH(D): CPT | Performed by: OBSTETRICS & GYNECOLOGY

## 2018-05-09 PROCEDURE — C9399 UNCLASSIFIED DRUGS OR BIOLOG: HCPCS | Performed by: NURSE ANESTHETIST, CERTIFIED REGISTERED

## 2018-05-09 PROCEDURE — 88302 TISSUE EXAM BY PATHOLOGIST: CPT | Mod: 26 | Performed by: OBSTETRICS & GYNECOLOGY

## 2018-05-09 PROCEDURE — 36415 COLL VENOUS BLD VENIPUNCTURE: CPT | Performed by: OBSTETRICS & GYNECOLOGY

## 2018-05-09 PROCEDURE — 36000057 ZZH SURGERY LEVEL 3 1ST 30 MIN - UMMC: Performed by: OBSTETRICS & GYNECOLOGY

## 2018-05-09 PROCEDURE — 37000008 ZZH ANESTHESIA TECHNICAL FEE, 1ST 30 MIN: Performed by: OBSTETRICS & GYNECOLOGY

## 2018-05-09 PROCEDURE — 71000014 ZZH RECOVERY PHASE 1 LEVEL 2 FIRST HR: Performed by: OBSTETRICS & GYNECOLOGY

## 2018-05-09 PROCEDURE — 71000015 ZZH RECOVERY PHASE 1 LEVEL 2 EA ADDTL HR: Performed by: OBSTETRICS & GYNECOLOGY

## 2018-05-09 PROCEDURE — 25000566 ZZH SEVOFLURANE, EA 15 MIN: Performed by: OBSTETRICS & GYNECOLOGY

## 2018-05-09 PROCEDURE — 25000128 H RX IP 250 OP 636: Performed by: NURSE ANESTHETIST, CERTIFIED REGISTERED

## 2018-05-09 PROCEDURE — 25000125 ZZHC RX 250: Performed by: NURSE ANESTHETIST, CERTIFIED REGISTERED

## 2018-05-09 PROCEDURE — 86900 BLOOD TYPING SEROLOGIC ABO: CPT | Performed by: OBSTETRICS & GYNECOLOGY

## 2018-05-09 PROCEDURE — 88302 TISSUE EXAM BY PATHOLOGIST: CPT | Performed by: OBSTETRICS & GYNECOLOGY

## 2018-05-09 PROCEDURE — 36000059 ZZH SURGERY LEVEL 3 EA 15 ADDTL MIN UMMC: Performed by: OBSTETRICS & GYNECOLOGY

## 2018-05-09 PROCEDURE — 40000170 ZZH STATISTIC PRE-PROCEDURE ASSESSMENT II: Performed by: OBSTETRICS & GYNECOLOGY

## 2018-05-09 PROCEDURE — C9290 INJ, BUPIVACAINE LIPOSOME: HCPCS | Performed by: ANESTHESIOLOGY

## 2018-05-09 RX ORDER — FENTANYL CITRATE 50 UG/ML
25-50 INJECTION, SOLUTION INTRAMUSCULAR; INTRAVENOUS
Status: DISCONTINUED | OUTPATIENT
Start: 2018-05-09 | End: 2018-05-09 | Stop reason: HOSPADM

## 2018-05-09 RX ORDER — IBUPROFEN 600 MG/1
600 TABLET, FILM COATED ORAL EVERY 6 HOURS PRN
Qty: 30 TABLET | Refills: 0 | Status: SHIPPED | OUTPATIENT
Start: 2018-05-09 | End: 2019-04-24

## 2018-05-09 RX ORDER — ONDANSETRON 2 MG/ML
4 INJECTION INTRAMUSCULAR; INTRAVENOUS EVERY 30 MIN PRN
Status: DISCONTINUED | OUTPATIENT
Start: 2018-05-09 | End: 2018-05-09 | Stop reason: HOSPADM

## 2018-05-09 RX ORDER — PROPOFOL 10 MG/ML
INJECTION, EMULSION INTRAVENOUS CONTINUOUS PRN
Status: DISCONTINUED | OUTPATIENT
Start: 2018-05-09 | End: 2018-05-09

## 2018-05-09 RX ORDER — SODIUM CHLORIDE, SODIUM LACTATE, POTASSIUM CHLORIDE, CALCIUM CHLORIDE 600; 310; 30; 20 MG/100ML; MG/100ML; MG/100ML; MG/100ML
INJECTION, SOLUTION INTRAVENOUS CONTINUOUS
Status: DISCONTINUED | OUTPATIENT
Start: 2018-05-09 | End: 2018-05-09 | Stop reason: HOSPADM

## 2018-05-09 RX ORDER — HYDROMORPHONE HYDROCHLORIDE 1 MG/ML
.3-.5 INJECTION, SOLUTION INTRAMUSCULAR; INTRAVENOUS; SUBCUTANEOUS EVERY 10 MIN PRN
Status: DISCONTINUED | OUTPATIENT
Start: 2018-05-09 | End: 2018-05-09 | Stop reason: HOSPADM

## 2018-05-09 RX ORDER — ONDANSETRON 4 MG/1
4 TABLET, ORALLY DISINTEGRATING ORAL EVERY 30 MIN PRN
Status: DISCONTINUED | OUTPATIENT
Start: 2018-05-09 | End: 2018-05-09 | Stop reason: HOSPADM

## 2018-05-09 RX ORDER — OXYCODONE AND ACETAMINOPHEN 5; 325 MG/1; MG/1
1-2 TABLET ORAL EVERY 4 HOURS PRN
Status: COMPLETED | OUTPATIENT
Start: 2018-05-09 | End: 2018-05-09

## 2018-05-09 RX ORDER — NALOXONE HYDROCHLORIDE 0.4 MG/ML
.1-.4 INJECTION, SOLUTION INTRAMUSCULAR; INTRAVENOUS; SUBCUTANEOUS
Status: DISCONTINUED | OUTPATIENT
Start: 2018-05-09 | End: 2018-05-09 | Stop reason: HOSPADM

## 2018-05-09 RX ORDER — ONDANSETRON 2 MG/ML
INJECTION INTRAMUSCULAR; INTRAVENOUS PRN
Status: DISCONTINUED | OUTPATIENT
Start: 2018-05-09 | End: 2018-05-09

## 2018-05-09 RX ORDER — GINSENG 100 MG
CAPSULE ORAL 2 TIMES DAILY
Status: DISCONTINUED | OUTPATIENT
Start: 2018-05-09 | End: 2018-05-09 | Stop reason: HOSPADM

## 2018-05-09 RX ORDER — OXYCODONE AND ACETAMINOPHEN 5; 325 MG/1; MG/1
1-2 TABLET ORAL EVERY 4 HOURS PRN
Qty: 10 TABLET | Refills: 0 | Status: SHIPPED | OUTPATIENT
Start: 2018-05-09 | End: 2019-04-24

## 2018-05-09 RX ORDER — PROPOFOL 10 MG/ML
INJECTION, EMULSION INTRAVENOUS PRN
Status: DISCONTINUED | OUTPATIENT
Start: 2018-05-09 | End: 2018-05-09

## 2018-05-09 RX ORDER — SODIUM CHLORIDE, SODIUM LACTATE, POTASSIUM CHLORIDE, CALCIUM CHLORIDE 600; 310; 30; 20 MG/100ML; MG/100ML; MG/100ML; MG/100ML
INJECTION, SOLUTION INTRAVENOUS CONTINUOUS PRN
Status: DISCONTINUED | OUTPATIENT
Start: 2018-05-09 | End: 2018-05-09

## 2018-05-09 RX ORDER — FENTANYL CITRATE 50 UG/ML
INJECTION, SOLUTION INTRAMUSCULAR; INTRAVENOUS PRN
Status: DISCONTINUED | OUTPATIENT
Start: 2018-05-09 | End: 2018-05-09

## 2018-05-09 RX ORDER — DEXAMETHASONE SODIUM PHOSPHATE 4 MG/ML
INJECTION, SOLUTION INTRA-ARTICULAR; INTRALESIONAL; INTRAMUSCULAR; INTRAVENOUS; SOFT TISSUE PRN
Status: DISCONTINUED | OUTPATIENT
Start: 2018-05-09 | End: 2018-05-09

## 2018-05-09 RX ORDER — ONDANSETRON 4 MG/1
4 TABLET, ORALLY DISINTEGRATING ORAL
Status: DISCONTINUED | OUTPATIENT
Start: 2018-05-09 | End: 2018-05-09 | Stop reason: HOSPADM

## 2018-05-09 RX ORDER — AMOXICILLIN 250 MG
1-2 CAPSULE ORAL 2 TIMES DAILY
Qty: 30 TABLET | Refills: 0 | Status: SHIPPED | OUTPATIENT
Start: 2018-05-09 | End: 2019-04-24

## 2018-05-09 RX ORDER — IBUPROFEN 600 MG/1
600 TABLET, FILM COATED ORAL
Status: DISCONTINUED | OUTPATIENT
Start: 2018-05-09 | End: 2018-05-09 | Stop reason: HOSPADM

## 2018-05-09 RX ORDER — MEPERIDINE HYDROCHLORIDE 25 MG/ML
12.5 INJECTION INTRAMUSCULAR; INTRAVENOUS; SUBCUTANEOUS
Status: DISCONTINUED | OUTPATIENT
Start: 2018-05-09 | End: 2018-05-09 | Stop reason: HOSPADM

## 2018-05-09 RX ORDER — KETOROLAC TROMETHAMINE 30 MG/ML
INJECTION, SOLUTION INTRAMUSCULAR; INTRAVENOUS PRN
Status: DISCONTINUED | OUTPATIENT
Start: 2018-05-09 | End: 2018-05-09

## 2018-05-09 RX ORDER — FLUMAZENIL 0.1 MG/ML
0.2 INJECTION, SOLUTION INTRAVENOUS
Status: DISCONTINUED | OUTPATIENT
Start: 2018-05-09 | End: 2018-05-09 | Stop reason: HOSPADM

## 2018-05-09 RX ADMIN — PROPOFOL 50 MCG/KG/MIN: 10 INJECTION, EMULSION INTRAVENOUS at 08:25

## 2018-05-09 RX ADMIN — KETOROLAC TROMETHAMINE 30 MG: 30 INJECTION, SOLUTION INTRAMUSCULAR at 09:13

## 2018-05-09 RX ADMIN — FENTANYL CITRATE 50 MCG: 50 INJECTION INTRAMUSCULAR; INTRAVENOUS at 07:51

## 2018-05-09 RX ADMIN — FENTANYL CITRATE 50 MCG: 50 INJECTION, SOLUTION INTRAMUSCULAR; INTRAVENOUS at 09:16

## 2018-05-09 RX ADMIN — BUPIVACAINE 20 ML: 13.3 INJECTION, SUSPENSION, LIPOSOMAL INFILTRATION at 09:28

## 2018-05-09 RX ADMIN — PROPOFOL 200 MG: 10 INJECTION, EMULSION INTRAVENOUS at 08:11

## 2018-05-09 RX ADMIN — MIDAZOLAM 2 MG: 1 INJECTION INTRAMUSCULAR; INTRAVENOUS at 07:51

## 2018-05-09 RX ADMIN — FENTANYL CITRATE 100 MCG: 50 INJECTION, SOLUTION INTRAMUSCULAR; INTRAVENOUS at 08:11

## 2018-05-09 RX ADMIN — SODIUM CHLORIDE, POTASSIUM CHLORIDE, SODIUM LACTATE AND CALCIUM CHLORIDE: 600; 310; 30; 20 INJECTION, SOLUTION INTRAVENOUS at 09:25

## 2018-05-09 RX ADMIN — ONDANSETRON 4 MG: 2 INJECTION INTRAMUSCULAR; INTRAVENOUS at 09:13

## 2018-05-09 RX ADMIN — FENTANYL CITRATE 50 MCG: 50 INJECTION INTRAMUSCULAR; INTRAVENOUS at 07:54

## 2018-05-09 RX ADMIN — OXYCODONE HYDROCHLORIDE AND ACETAMINOPHEN 1 TABLET: 5; 325 TABLET ORAL at 10:51

## 2018-05-09 RX ADMIN — FENTANYL CITRATE 50 MCG: 50 INJECTION, SOLUTION INTRAMUSCULAR; INTRAVENOUS at 09:29

## 2018-05-09 RX ADMIN — ROCURONIUM BROMIDE 50 MG: 10 INJECTION INTRAVENOUS at 08:11

## 2018-05-09 RX ADMIN — DEXAMETHASONE SODIUM PHOSPHATE 6 MG: 4 INJECTION, SOLUTION INTRAMUSCULAR; INTRAVENOUS at 08:25

## 2018-05-09 RX ADMIN — SUGAMMADEX 200 MG: 100 INJECTION, SOLUTION INTRAVENOUS at 09:22

## 2018-05-09 RX ADMIN — PHENYLEPHRINE HYDROCHLORIDE 50 MCG: 10 INJECTION, SOLUTION INTRAMUSCULAR; INTRAVENOUS; SUBCUTANEOUS at 08:45

## 2018-05-09 RX ADMIN — SODIUM CHLORIDE, POTASSIUM CHLORIDE, SODIUM LACTATE AND CALCIUM CHLORIDE: 600; 310; 30; 20 INJECTION, SOLUTION INTRAVENOUS at 08:03

## 2018-05-09 NOTE — ANESTHESIA PROCEDURE NOTES
Peripheral Nerve Block Procedure Note    Staff:     Anesthesiologist:  EDUIN PAYNE  Location: Pre-op  Procedure Start/Stop TImes:     patient identified, IV checked, site marked, risks and benefits discussed, informed consent, monitors and equipment checked, pre-op evaluation, at physician/surgeon's request and post-op pain management      Correct Patient: Yes      Correct Position: Yes      Correct Site: Yes      Correct Procedure: Yes      Correct Laterality:  Yes    Site Marked:  Yes  Procedure details:     Procedure:  TAP    ASA:  2    Laterality:  Bilateral    Position:  Supine    Sterile Prep: chloraprep      Needle:  Short bevel    Needle gauge:  20    Needle length (inches):  4    Ultrasound: Yes      Ultrasound used to identify targeted nerve, plexus, or vascular structure and placed a needle adjacent to it      Permanent Image entered into patiient's record      Abnormal pain on injection: No      Blood Aspirated: No      Paresthesias:  No    Bleeding at site: No      Bolus via:  Needle    Infusion Method:  Single Shot    Complications:  None  Assessment/Narrative:     Injection made incrementally with aspirations every (mL):  5

## 2018-05-09 NOTE — ANESTHESIA PREPROCEDURE EVALUATION
Anesthesia Evaluation     . Pt has had prior anesthetic.     No history of anesthetic complications          ROS/MED HX    ENT/Pulmonary:  - neg pulmonary ROS     Neurologic:  - neg neurologic ROS     Cardiovascular:     (+) hypertension----. : . . . :. .       METS/Exercise Tolerance:  >4 METS   Hematologic:         Musculoskeletal:   (+) , , other musculoskeletal- LBP      GI/Hepatic:     (+) GERD       Renal/Genitourinary:  - ROS Renal section negative       Endo:     (+) Obesity, .      Psychiatric:     (+) psychiatric history anxiety and depression      Infectious Disease:  - neg infectious disease ROS       Malignancy:      - no malignancy   Other: Comment: Hx/o motion sickness   (+) No chance of pregnancy                    Physical Exam  Normal systems: cardiovascular, pulmonary and dental    Airway   Mallampati: I  TM distance: >3 FB  Neck ROM: full    Dental     Cardiovascular   Rhythm and rate: regular and normal      Pulmonary    breath sounds clear to auscultation                    Anesthesia Plan      History & Physical Review  History and physical reviewed and following examination; no interval change.    ASA Status:  2 .    NPO Status:  > 6 hours    Plan for General, ETT and Periph. Nerve Block for postop pain with Intravenous induction. Maintenance will be Balanced.    PONV prophylaxis:  Ondansetron (or other 5HT-3) and Dexamethasone or Solumedrol  GETA, Standard ASA monitoring  PNB per RAPS  All available and pertinent medical records and test results reviewed.  Risks, including but not limited to airway injury, bronchospasm,  hypoxemia, PONV, need for blood transfusion d/w patient      Postoperative Care  Postoperative pain management:  IV analgesics, Peripheral nerve block (Single Shot) and Oral pain medications.      Consents  Anesthetic plan, risks, benefits and alternatives discussed with:  Patient and Spouse.  Use of blood products discussed: No .   .                          .

## 2018-05-09 NOTE — OR NURSING
Tattoo on both ankles. Has mild burning and redness in both groin folds after using Kostas for abdominal scrub

## 2018-05-09 NOTE — OR NURSING
Reviewed discharge instructions and meds with patient and . Up to the bathroom to void tolerated well. Taking fluids without nausea. Lap sites dry and intact..

## 2018-05-09 NOTE — ANESTHESIA POSTPROCEDURE EVALUATION
Patient: Malika So    Procedure(s):  Bilateral Laparoscopic Salpingectomy  - Wound Class: II-Clean Contaminated    Diagnosis:Desires Permanent Sterilization  Diagnosis Additional Information: No value filed.    Anesthesia Type:  General, ETT, Periph. Nerve Block for postop pain    Note:  Anesthesia Post Evaluation    Patient location during evaluation: PACU  Patient participation: Able to fully participate in evaluation  Level of consciousness: awake and alert  Pain management: adequate  Airway patency: patent  Cardiovascular status: hemodynamically stable  Respiratory status: room air and spontaneous ventilation  Hydration status: euvolemic  PONV: none             Last vitals:  Vitals:    05/09/18 1000 05/09/18 1015 05/09/18 1030   BP: 110/62 105/61 111/68   Pulse:      Resp: 14 13 12   Temp:      SpO2: 100% 100% 99%         Electronically Signed By: Albert Dey MD  May 9, 2018  10:46 AM

## 2018-05-09 NOTE — OP NOTE
OPERATIVE NOTE    Procedure date: 2018    Pre-procedure Dx:   Desires Permanent Sterilization    Post- Procedure Dx:  Same s/p procedure    Procedure:   Laparoscopic bilateral salpingectomy    Surgeon: Dr. Mai  Assistant(s):   Mikayla Loera, PGY-4  Fay Cabezas, MS4    Anesthesia: General Endotracheal Anesthesia  EBL: 5 mL  UOP: 400 mL  IVF: 1000 mL    Complications: None   Findings: On EUA, small uterus palpated with IUD strings felt going through cervical os. Exam limited by body habitus. On laparoscopy, entry point free of injury. Bilateral ureters seen vermiculating into the pelvis well away from surgical site. Midline adhesions from omentum to abdominal wall inferior the umbilicus not taken down as could operate around the adhesions and see into the pelvis clearly. Normal appearing uterus, bilateral ovaries, and bilateral fallopian tubes. Hemostatic at end of case.    Specimens: Bilateral fallopian tubes    Indications: Malika So is a 32 year old  who desires permanent sterilization. She has been counseled about the risks of tubal ligation, including the risk of regret. The risks, benefits and alternatives of surgical management were discussed in detail with the patient and she signed an informed consent on the morning of the procedure. Mirena IUD will remain in place.    Procedure: The patient was taken to the operating room where general anesthesia was obtained without difficulty. The patient was then examined under anesthesia and found to have a small, mobile uterus although exam limited due to body habitus. She was then placed in the dorsal lithotomy position and prepped and draped in the usual sterile fashion. A large graves speculum was placed in the patient's vagina and the anterior lip of the cervix was grasped with the single toothed tenaculum. A acorn uterine manipulator was then advanced into the uterus to provide a means to manipulate the uterus and the single toothed  tenaculum was removed. The Mirena IUD strings were viewed prior to placement of the acorn manipulator placement. The speculum was removed from the vagina.    Attention was then turned to the patient's abdomen where a 5 mm vertical skin incision was made in the umbilical fold. The Veres needle and Step sleeve were carefully introduced into the peritoneal cavity at a 45 degree angle while tenting the abdominal wall. Intraperitoneal placement was confirmed by use of a water-filled syringe and an intraabdominal pressure of 0 mm on insuflation with CO2 gas. The trocar was then advanced without difficulty into the abdomen where intraabdominal placement was confirmed by the laparoscope. 2 cm superior and medial to the ASIS away from major vessels, a 5mm skin incision was made in the right lower quadrant and the Step sleeve advanced under direct visualization. The Step trocahr was then placed under direct visualization. In a similar fashion the left lower quadrant had a 5 mm incision made and the Step sleeve inserted under direct visualization. The 5 mm trochar was then placed under direct visualization.     A survey of the patient's pelvis and abdomen revealed normal uterus, fallopian tubes, ovaries, and liver surface. Patient's lap band seen also.  Patient placed in Trendelenburg. An atraumatic grasper was used to get the bowel out of the pelvis. The Ligasure was advanced through the right lower quadrant port and patient's right fallopian tube identified and followed out to the fimbriated end with help from a grasper holding tension on the tube coming from the left lower quadrant port. The Ligasure was used to cauterize and then cut along the full length of the fallopian tube from the fimbriated edge to the uterus and then along the fallopian tube itself for full removal. Good hemostasis was noted. The right ureter was seen vermiculating out of the surgical field and the surgical site noted to be hemostatic. Attention was  then turned to the patient's left fallopian tube. The Ligasure was advanced through the left lower quadrant port and used to grasp the patient's left fallopian tube. The Ligasure and left fallopian tube were manipulated in a similar fashion with cauterization and cutting along the full length of the fallopian tube from the fimbriated to the uterus with full removal of the tube. The left ureter was seen vermiculating out of the surgical field and the surgical site noted to be hemostatic. The left lower quadrant site was extended to 10mm with the Step device. The bilateral fallopian tubes were removed from this without incident. Good hemostasis noted.    The right and left lower quadrant ports were then removed under direct visualization after de-insufflation of the abdomen. The infraumbilical port was also removed. 4-0 monocryl was used to close the skin on all port sites. Dermabond substitute was placed over the skin incision.    The uterine manipulator was then removed from the vagina and pressure was held at the cervix at the tenaculum site. Hemostatic with pressure. At this point, her Mirena IUD strings appeared to be long and so want to have an ultrasound post-op to ensure proper placement of the Mirena IUD in the uterus so that it was not displaced by the acorn manipulator at time of surgery. The patient tolerated the procedure well. Sponge, lap, and needle counts were correct times two. The patient was taken to the recovery room in stable condition.    Dr. Mai was present and scrubbed for the entire procedure.    Mikayla Loera MD   OBGYN, PGY-4  5/9/2018 9:40 AM    Staff:  I was scrubbed and present for entire case and agree w/ above note.    Celeste Mai MD

## 2018-05-09 NOTE — ANESTHESIA CARE TRANSFER NOTE
Patient: Malika So    Procedure(s):  Bilateral Laparoscopic Salpingectomy  - Wound Class: II-Clean Contaminated    Diagnosis: Desires Permanent Sterilization  Diagnosis Additional Information: No value filed.    Anesthesia Type:   General, ETT, Periph. Nerve Block for postop pain     Note:  Airway :Face Mask  Patient transferred to:PACU  Comments: Arrived in PACU, report to RN, vitals stable, temp 36.5, PIV patent, patient comfortable.Handoff Report: Identifed the Patient, Identified the Reponsible Provider, Reviewed the pertinent medical history, Discussed the surgical course, Reviewed Intra-OP anesthesia mangement and issues during anesthesia, Set expectations for post-procedure period and Allowed opportunity for questions and acknowledgement of understanding      Vitals: (Last set prior to Anesthesia Care Transfer)    CRNA VITALS  5/9/2018 0859 - 5/9/2018 0936      5/9/2018             Pulse: 92    SpO2: 99 %    Resp Rate (observed): 20                Electronically Signed By: MATT Naranjo CRNA  May 9, 2018  9:36 AM

## 2018-05-09 NOTE — DISCHARGE INSTRUCTIONS
[facility]   Adult Discharge Orders & Instructions     For 24 hours after surgery    1. Get plenty of rest.  A responsible adult must stay with you for at least 24 hours after you leave the hospital.   2. Do not drive or use heavy equipment.  If you have weakness or tingling, don't drive or use heavy equipment until this feeling goes away.  3. Do not drink alcohol.  4. Avoid strenuous or risky activities.  Ask for help when climbing stairs.   5. You may feel lightheaded.  IF so, sit for a few minutes before standing.  Have someone help you get up.   6. If you have nausea (feel sick to your stomach): Drink only clear liquids such as apple juice, ginger ale, broth or 7-Up.  Rest may also help.  Be sure to drink enough fluids.  Move to a regular diet as you feel able.  7. You may have a slight fever. Call the doctor if your fever is over 100 F (37.7 C) (taken under the tongue) or lasts longer than 24 hours.  8. You may have a dry mouth, a sore throat, muscle aches or trouble sleeping.  These should go away after 24 hours.  9. Do not make important or legal decisions.   Call your doctor for any of the followin.  Signs of infection (fever, growing tenderness at the surgery site, a large amount of drainage or bleeding, severe pain, foul-smelling drainage, redness, swelling).    2. It has been over 8 to 10 hours since surgery and you are still not able to urinate (pass water).    3.  Headache for over 24 hours.    4.  Numbness, tingling or weakness the day after surgery (if you had spinal anesthesia).  To contact a doctor, call ________________________________________ Discharge Instructions:   Following a Laparoscopy    Comfort:    The amount of discomfort you can expect is very unpredictable.     If you have pain that cannot be controlled with non aspirin medication or with the prescription medication you may have received, you should notify your physician.     You May Experience:    Abdominal  "tenderness; abdominal cramps (like menstrual cramps).    Low back ache or discomfort radiating to your shoulders, chest, back or neck. This is a result of the gas used to inflate your abdomen during surgery. This gas is absorbed in 24 to 36 hours. The \"knee chest\" position will help relieve this discomfort.    Sore throat for a day or two resulting from the anesthesia tube used during surgery. You may use throat lozenges to help relieve this discomfort.    Black and blue marks on your abdomen.    Drainage:    You may expect a small amount of drainage from the incision on your abdomen and you may change the bandage when necessary.    You may also have a small amount of vaginal drainage for 3 to 4 days; this is normal and no cause for concern. If excessive bleeding occurs, notify your physician.    Do not douche, and use a pad rather than tampons. Do not resume intercourse for at least one week or until bleeding has ceased.    Home Activity:    The day of surgery spend a quiet day at home.    Increase activity as tolerated.    You may bathe or shower, do not soak in bath tub or scrub incisions.    You have no restrictions on your diet. Following surgery, drink plenty of fluids and eat a light meal.    The anesthesia may produce some nausea. If you feel nauseated, stay in bed, keep your head down and try drinking fluids such as Seven-Up, tea or soup.    Notify Physician at Once IF:    You have a fever over 100 degrees. A low grade fever (under 100 degrees) is usual after surgery.    You have severe pain.    You have a large amount of bleeding or drainage.    Rev. 4/2014  "

## 2018-05-09 NOTE — BRIEF OP NOTE
BRIEF OPERATIVE NOTE    Procedure date: 5/9/2018    Pre-procedure Dx:   Desires Permanent Sterilization    Post- Procedure Dx:  Same s/p procedure    Procedure:   Laparoscopic bilateral salpingectomy    Surgeon: Dr. Mai  Assistant(s): Mikayla Loera, PGY-4    Anesthesia: General Endotracheal Anesthesia  EBL: 5 mL  UOP: 400 mL  IVF: 1000 mL    Complications: None   Findings: On EUA, small uterus palpated with IUD strings felt going through cervical os. Exam limited by body habitus. On laparoscopy, entry point free of injury. Bilateral ureters seen vermiculating into the pelvis well away from surgical site. Hemostatic at end of case.    Specimens: Bilateral fallopian tubes    Attending was scrubbed and present for the entire procedure.    Mikayla Loera MD   OBGYN, PGY-4  5/9/2018 9:23 AM

## 2018-05-09 NOTE — IP AVS SNAPSHOT
[facility]    [address] [place] [address]    Phone:  [phone]                                       After Visit Summary   5/9/2018    Malika So    MRN: [id number]           After Visit Summary Signature Page     I have received my discharge instructions, and my questions have been answered. I have discussed any challenges I see with this plan with the nurse or doctor.    ..........................................................................................................................................  Patient/Patient Representative Signature      ..........................................................................................................................................  Patient Representative Print Name and Relationship to Patient    ..................................................               ................................................  Date                                            Time    ..........................................................................................................................................  Reviewed by Signature/Title    ...................................................              ..............................................  Date                                                            Time

## 2018-05-09 NOTE — BRIEF OP NOTE
OPERATIVE NOTE    Procedure date: 2018    Pre-procedure Dx:   Desires Permanent Sterilization    Post- Procedure Dx:  Same s/p procedure    Procedure:   Laparoscopic bilateral salpingectomy    Surgeon: Dr. Mai  Assistant(s):   Mikayla Loera, PGY-4  Fay Cabezas, MS4    Anesthesia: General Endotracheal Anesthesia  EBL: 5 mL  UOP: 400 mL  IVF: 1000 mL    Complications: None   Findings: On EUA, small uterus palpated with IUD strings felt going through cervical os. Exam limited by body habitus. On laparoscopy, entry point free of injury. Bilateral ureters seen vermiculating into the pelvis well away from surgical site. Midline adhesions from omentum to abdominal wall inferior the umbilicus not taken down as could operate around the adhesions and see into the pelvis clearly. Normal appearing uterus, bilateral ovaries, and bilateral fallopian tubes. Hemostatic at end of case.    Specimens: Bilateral fallopian tubes    Indications: Malika So is a 32 year old  who desires permanent sterilization. She has been counseled about the risks of tubal ligation, including the risk of regret. The risks, benefits and alternatives of surgical management were discussed in detail with the patient and she signed an informed consent on the morning of the procedure. Mirena IUD will remain in place.    Procedure: The patient was taken to the operating room where general anesthesia was obtained without difficulty. The patient was then examined under anesthesia and found to have a small, mobile uterus although exam limited due to body habitus. She was then placed in the dorsal lithotomy position and prepped and draped in the usual sterile fashion. A large graves speculum was placed in the patient's vagina and the anterior lip of the cervix was grasped with the single toothed tenaculum. A acorn uterine manipulator was then advanced into the uterus to provide a means to manipulate the uterus and the single toothed  tenaculum was removed. The Mirena IUD strings were viewed prior to placement of the acorn manipulator placement. The speculum was removed from the vagina.    Attention was then turned to the patient's abdomen where a 5 mm vertical skin incision was made in the umbilical fold. The Veres needle and Step sleeve were carefully introduced into the peritoneal cavity at a 45 degree angle while tenting the abdominal wall. Intraperitoneal placement was confirmed by use of a water-filled syringe and an intraabdominal pressure of 0 mm on insuflation with CO2 gas. The trocar was then advanced without difficulty into the abdomen where intraabdominal placement was confirmed by the laparoscope. 2 cm superior and medial to the ASIS away from major vessels, a 5mm skin incision was made in the right lower quadrant and the Step sleeve advanced under direct visualization. The Step trocahr was then placed under direct visualization. In a similar fashion the left lower quadrant had a 5 mm incision made and the Step sleeve inserted under direct visualization. The 5 mm trochar was then placed under direct visualization.     A survey of the patient's pelvis and abdomen revealed normal uterus, fallopian tubes, ovaries, and liver surface. Patient's lap band seen also.  Patient placed in Trendelenburg. An atraumatic grasper was used to get the bowel out of the pelvis. The Ligasure was advanced through the right lower quadrant port and patient's right fallopian tube identified and followed out to the fimbriated end with help from a grasper holding tension on the tube coming from the left lower quadrant port. The Ligasure was used to cauterize and then cut along the full length of the fallopian tube from the fimbriated edge to the uterus and then along the fallopian tube itself for full removal. Good hemostasis was noted. The right ureter was seen vermiculating out of the surgical field and the surgical site noted to be hemostatic. Attention was  then turned to the patient's left fallopian tube. The Ligasure was advanced through the left lower quadrant port and used to grasp the patient's left fallopian tube. The Ligasure and left fallopian tube were manipulated in a similar fashion with cauterization and cutting along the full length of the fallopian tube from the fimbriated to the uterus with full removal of the tube. The left ureter was seen vermiculating out of the surgical field and the surgical site noted to be hemostatic. The left lower quadrant site was extended to 10mm with the Step device. The bilateral fallopian tubes were removed from this without incident. Good hemostasis noted.    The right and left lower quadrant ports were then removed under direct visualization after de-insufflation of the abdomen. The infraumbilical port was also removed. 4-0 monocryl was used to close the skin on all port sites. Dermabond substitute was placed over the skin incision.    The uterine manipulator was then removed from the vagina and pressure was held at the cervix at the tenaculum site. Hemostatic with pressure. At this point, her Mirena IUD strings appeared to be long and so want to have an ultrasound post-op to ensure proper placement of the Mirena IUD in the uterus so that it was not displaced by the acorn manipulator at time of surgery. The patient tolerated the procedure well. Sponge, lap, and needle counts were correct times two. The patient was taken to the recovery room in stable condition.    Dr. Mai was present and scrubbed for the entire procedure.    Mikayla Loera MD   OBGYN, PGY-4  5/9/2018 9:40 AM

## 2018-05-10 LAB — COPATH REPORT: NORMAL

## 2018-05-11 RX ORDER — FENTANYL CITRATE 50 UG/ML
25-50 INJECTION, SOLUTION INTRAMUSCULAR; INTRAVENOUS
Status: DISCONTINUED | OUTPATIENT
Start: 2018-05-11 | End: 2019-05-22

## 2018-05-11 RX ORDER — NALOXONE HYDROCHLORIDE 0.4 MG/ML
.1-.4 INJECTION, SOLUTION INTRAMUSCULAR; INTRAVENOUS; SUBCUTANEOUS
Status: DISCONTINUED | OUTPATIENT
Start: 2018-05-11 | End: 2019-05-22

## 2018-05-11 RX ORDER — FLUMAZENIL 0.1 MG/ML
0.2 INJECTION, SOLUTION INTRAVENOUS
Status: DISCONTINUED | OUTPATIENT
Start: 2018-05-11 | End: 2019-05-22

## 2018-05-11 NOTE — ADDENDUM NOTE
Addendum  created 05/11/18 1728 by Atul Tilley MD    Anesthesia Intra Blocks edited, Anesthesia Intra Meds edited, Order sets accessed, Sign clinical note

## 2018-05-21 ENCOUNTER — RADIANT APPOINTMENT (OUTPATIENT)
Dept: ULTRASOUND IMAGING | Facility: CLINIC | Age: 33
End: 2018-05-21
Payer: COMMERCIAL

## 2018-05-21 DIAGNOSIS — Z90.79 STATUS POST BILATERAL SALPINGECTOMY: ICD-10-CM

## 2018-05-21 LAB — RADIOLOGIST FLAGS: NORMAL

## 2018-05-22 ENCOUNTER — OFFICE VISIT (OUTPATIENT)
Dept: OBGYN | Facility: CLINIC | Age: 33
End: 2018-05-22
Attending: OBSTETRICS & GYNECOLOGY
Payer: COMMERCIAL

## 2018-05-22 VITALS
BODY MASS INDEX: 31.74 KG/M2 | HEART RATE: 91 BPM | SYSTOLIC BLOOD PRESSURE: 116 MMHG | HEIGHT: 70 IN | WEIGHT: 221.7 LBS | DIASTOLIC BLOOD PRESSURE: 80 MMHG

## 2018-05-22 DIAGNOSIS — Z98.890 POSTOPERATIVE STATE: Primary | ICD-10-CM

## 2018-05-22 PROCEDURE — G0463 HOSPITAL OUTPT CLINIC VISIT: HCPCS | Mod: ZF

## 2018-05-22 NOTE — NURSING NOTE
Chief Complaint   Patient presents with     Follow Up For     post-op 5/9/2018     Pt is doing well.

## 2018-05-22 NOTE — LETTER
"2018     RE: Malika Duarte  [address]  [place] [address]     Dear Colleague,    Thank you for referring your patient, Malika Duarte, to the [facility] at [facility]. Please see a copy of my visit note below.    [facility]  Postoperative Visit  2018    S: Malika Duarte is a 32 year old  here for post-operative visit following bilateral salpingectomy on 18 She reports feeling well, but does note that she had a severe pruritic rash post-operatively due to the chlorhexadine. This has since resolved. Otherwise doing well. No fevers, chills, chest pain, shortness of breath, abdominal pain, difficulties with BMs or urination. Of note, she was noted to have long IUD strings on exam in the OR. Dr. Pugh recommended an US to determine location of IUD. US revealed low position. Patient initially using IUD for contraception and menorrhagia. Her bleeding remains well controlled.    O:   /80  Pulse 91  Ht 1.778 m (5' 10\")  Wt 100.6 kg (221 lb 11.2 oz)  BMI 31.81 kg/m2  Exam:   General: NAD  Abd; Soft, non-distended, non tender. Incisions well healed. Dead skin and moisture in navel. No erythema or drainage.    Labs:   Hemoglobin   Date Value Ref Range Status   2018 13.7 11.7 - 15.7 g/dL Final     Pathology:   Copath Report   Date Value Ref Range Status   2018   Final    Patient Name: MALIKA DUARTE  MR#: [id number]  Specimen #: [id number]  Collected: 2018  Received: 2018  Reported: 5/10/2018 16:03  Ordering Phy(s): MARVEL PUGH    For improved result formatting, select 'View Enhanced Report Format' under   Linked Documents section.    SPECIMEN(S):  Fallopian tube, bilateral    FINAL DIAGNOSIS:  FALLOPIAN TUBE, BILATERAL, BILATERAL LAPAROSCOPIC SALPINGECTOMY:  - Complete cross sections of fallopian tubes with no significant   histologic abnormality    I have " "personally reviewed all specimens and/or slides, including the   listed special stains, and used them  with my medical judgement to determine or confirm the final diagnosis.    Electronically signed out by:    Aaron Hudson M.D., PhD, Physicians    CLINICAL HISTORY:  32-year-old woman desires permanent sterilization.    GROSS:  The specimen is received in formalin with proper patient identification,   labeled \"bilateral fallopian tubes\".  The specimen consists of two pink-tan patent the fimbriated fallopian   tubes measuring 7.1 and 8.2 cm in length  and ranging from 0.3-0.5 cm in diameter.  No cysts or masses are   identified.  Representative sections are  submitted.    Summary of Sections:  A1 - larger fallopian tube  A2 - smaller fallopian tube (Dictated by: Socorro Blum 5/9/2018 10:33   AM)    MICROSCOPIC:  Microscopic examination was performed.    CPT Codes:  A: 69299-LX6    TESTING LAB LOCATION:  [address] [address] [phone]    COLLECTION SITE:  Client: [facility]  Location: [facility] (B)    Resident  [id number]         A: 32 year old female s/p bilateral salpingectomy on 5/9/18. Doing well. Incisions healing appropriately. Incidentally found to have low position of IUD.    P:   #Low position of IUD: Patient initially using IUD for both contraception and heavy periods. However, now that she has had a bilateral salpingectomy, will no longer need her IUD for contraceptive purposes. She currently denies bothersome bleeding. We discussed different options regarding management of low position IUD, including leaving it where it is or taking it out and replacing it with a new one. Patient opts to just leave the current one in place, as her bleeding remains well controlled. Advised her to let us know if bleeding worsens. Can consider replacing IUD in that instance.    #Post-op: Doing well, " "meeting goals. Incisions healing well and chlorhexidine rash resolved.  - Continue cleaning navel and using something for moisture control. May return to work.   - Chlorhexidine added to allergy list    Patient seen with Dr. Sergei Plascencia MD  OBGYN PGY-1  2018, 10:21 AM        [facility]  Postoperative Visit  2018    S: Malika Duarte is a 32 year old  here for post-operative visit following bilateral salpingectomy on .  She reports feeling wel. Pain is well controlled. Pinching sensation in lower panus when she bends over. Concerned about the navel incision. No fevers.    Rash better, dial soap and foot powder to absorb moisture.   Clearance paperwork    O:   /80  Pulse 91  Ht 1.778 m (5' 10\")  Wt 100.6 kg (221 lb 11.2 oz)  BMI 31.81 kg/m2  Exam: incisions healing well. Has some skin sloughing presumably d/t moisture.     Labs:   Hemoglobin   Date Value Ref Range Status   2018 13.7 11.7 - 15.7 g/dL Final       Pathology:   Copath Report   Date Value Ref Range Status   2018   Final    Patient Name: MALIKA DUARTE  MR#: [id number]  Specimen #: [id number]  Collected: 2018  Received: 2018  Reported: 5/10/2018 16:03  Ordering Phy(s): MARVEL PUGH    For improved result formatting, select 'View Enhanced Report Format' under   Linked Documents section.    SPECIMEN(S):  Fallopian tube, bilateral    FINAL DIAGNOSIS:  FALLOPIAN TUBE, BILATERAL, BILATERAL LAPAROSCOPIC SALPINGECTOMY:  - Complete cross sections of fallopian tubes with no significant   histologic abnormality    I have personally reviewed all specimens and/or slides, including the   listed special stains, and used them  with my medical judgement to determine or confirm the final diagnosis.    Electronically signed out by:    Aaron Hudson M.D., PhD, [facility]    CLINICAL HISTORY:  32-year-old woman desires permanent sterilization.    GROSS:  The specimen is received " "in formalin with proper patient identification,   labeled \"bilateral fallopian tubes\".  The specimen consists of two pink-tan patent the fimbriated fallopian   tubes measuring 7.1 and 8.2 cm in length  and ranging from 0.3-0.5 cm in diameter.  No cysts or masses are   identified.  Representative sections are  submitted.    Summary of Sections:  A1 - larger fallopian tube  A2 - smaller fallopian tube (Dictated by: Socorro Blum 5/9/2018 10:33   AM)    MICROSCOPIC:  Microscopic examination was performed.    CPT Codes:  A: 87428-SK0    TESTING LAB LOCATION:  [address] [address]  [phone]    COLLECTION SITE:  Client: [facility]  Location: [facility] (B)    Resident  PXK2       A: 32 year old female PO s/p bilaterally. Doing well, no concerns     P:   F/u prn   Deven Plascencia MD  OBGYN PGY-1  5/22/2018, 10:03 AM    [facility] staff:  Appreciate note by Dr. Plascencia.  I have seen and examined the patient without the resident. I have reviewed, edited, and agree with the note.        Celeste Mai MD, FACOG  5/25/2018  10:17 AM    "

## 2018-05-22 NOTE — PROGRESS NOTES
"[facility]  Postoperative Visit  2018    S: Malika Duarte is a 32 year old  here for post-operative visit following bilateral salpingectomy on .  She reports feeling wel. Pain is well controlled. Pinching sensation in lower panus when she bends over. Concerned about the navel incision. No fevers.    Rash better, dial soap and foot powder to absorb moisture.   Clearance paperwork    O:   /80  Pulse 91  Ht 1.778 m (5' 10\")  Wt 100.6 kg (221 lb 11.2 oz)  BMI 31.81 kg/m2  Exam: incisions healing well. Has some skin sloughing presumably d/t moisture.     Labs:   Hemoglobin   Date Value Ref Range Status   2018 13.7 11.7 - 15.7 g/dL Final       Pathology:   Copath Report   Date Value Ref Range Status   2018   Final    Patient Name: MALIKA DUARTE  MR#: [id number]  Specimen #: [id number]  Collected: 2018  Received: 2018  Reported: 5/10/2018 16:03  Ordering Phy(s): MARVEL PUGH    For improved result formatting, select 'View Enhanced Report Format' under   Linked Documents section.    SPECIMEN(S):  Fallopian tube, bilateral    FINAL DIAGNOSIS:  FALLOPIAN TUBE, BILATERAL, BILATERAL LAPAROSCOPIC SALPINGECTOMY:  - Complete cross sections of fallopian tubes with no significant   histologic abnormality    I have personally reviewed all specimens and/or slides, including the   listed special stains, and used them  with my medical judgement to determine or confirm the final diagnosis.    Electronically signed out by:    Aaron Hudson M.D., PhD, [facility]    CLINICAL HISTORY:  32-year-old woman desires permanent sterilization.    GROSS:  The specimen is received in formalin with proper patient identification,   labeled \"bilateral fallopian tubes\".  The specimen consists of two pink-tan patent the fimbriated fallopian   tubes measuring 7.1 and 8.2 cm in length  and ranging from 0.3-0.5 cm in diameter.  No cysts or masses are   identified.  Representative " sections are  submitted.    Summary of Sections:  A1 - larger fallopian tube  A2 - smaller fallopian tube (Dictated by: Socorro Blum 5/9/2018 10:33   AM)    MICROSCOPIC:  Microscopic examination was performed.    CPT Codes:  A: 77377-TL1    TESTING LAB LOCATION:  [address] [address]  [phone]    COLLECTION SITE:  Client: [facility]  Location: [facility] (B)    Resident  PXK2     ]    A: 32 year old female PO s/p bilaterally. Doing well, no concerns     P:   F/u prn       Deven Plascencia MD  OBGYN PGY-1  5/22/2018, 10:03 AM      [facility] staff:  Appreciate note by Dr. Plascencia.  I have seen and examined the patient without the resident. I have reviewed, edited, and agree with the note.        Celeste Mai MD, FACOG  5/25/2018  10:17 AM

## 2018-05-22 NOTE — PROGRESS NOTES
"[facility]  Postoperative Visit  2018    S: Malika Duarte is a 32 year old  here for post-operative visit following bilateral salpingectomy on 18 She reports feeling well, but does note that she had a severe pruritic rash post-operatively due to the chlorhexadine. This has since resolved. Otherwise doing well. No fevers, chills, chest pain, shortness of breath, abdominal pain, difficulties with BMs or urination. Of note, she was noted to have long IUD strings on exam in the OR. Dr. Pugh recommended an US to determine location of IUD. US revealed low position. Patient initially using IUD for contraception and menorrhagia. Her bleeding remains well controlled.    O:   /80  Pulse 91  Ht 1.778 m (5' 10\")  Wt 100.6 kg (221 lb 11.2 oz)  BMI 31.81 kg/m2  Exam:   General: NAD  Abd; Soft, non-distended, non tender. Incisions well healed. Dead skin and moisture in navel. No erythema or drainage.    Labs:   Hemoglobin   Date Value Ref Range Status   2018 13.7 11.7 - 15.7 g/dL Final     Pathology:   Copath Report   Date Value Ref Range Status   2018   Final    Patient Name: MALIKA DUARTE  MR#: [id number]  Specimen #: [id number]  Collected: 2018  Received: 2018  Reported: 5/10/2018 16:03  Ordering Phy(s): MARVEL PUGH    For improved result formatting, select 'View Enhanced Report Format' under   Linked Documents section.    SPECIMEN(S):  Fallopian tube, bilateral    FINAL DIAGNOSIS:  FALLOPIAN TUBE, BILATERAL, BILATERAL LAPAROSCOPIC SALPINGECTOMY:  - Complete cross sections of fallopian tubes with no significant   histologic abnormality    I have personally reviewed all specimens and/or slides, including the   listed special stains, and used them  with my medical judgement to determine or confirm the final diagnosis.    Electronically signed out by:    Aaron Hudson M.D., PhD, [facility]    CLINICAL HISTORY:  32-year-old woman desires permanent " "sterilization.    GROSS:  The specimen is received in formalin with proper patient identification,   labeled \"bilateral fallopian tubes\".  The specimen consists of two pink-tan patent the fimbriated fallopian   tubes measuring 7.1 and 8.2 cm in length  and ranging from 0.3-0.5 cm in diameter.  No cysts or masses are   identified.  Representative sections are  submitted.    Summary of Sections:  A1 - larger fallopian tube  A2 - smaller fallopian tube (Dictated by: Socorro Blum 5/9/2018 10:33   AM)    MICROSCOPIC:  Microscopic examination was performed.    CPT Codes:  A: 15017-YV5    TESTING LAB LOCATION:  [facility], [address] [place] [address] [phone]    COLLECTION SITE:  Client: [facility]  Location: [facility] (B)    Resident  PXK2         A: 32 year old female s/p bilateral salpingectomy on 5/9/18. Doing well. Incisions healing appropriately. Incidentally found to have low position of IUD.    P:   #Low position of IUD: Patient initially using IUD for both contraception and heavy periods. However, now that she has had a bilateral salpingectomy, will no longer need her IUD for contraceptive purposes. She currently denies bothersome bleeding. We discussed different options regarding management of low position IUD, including leaving it where it is or taking it out and replacing it with a new one. Patient opts to just leave the current one in place, as her bleeding remains well controlled. Advised her to let us know if bleeding worsens. Can consider replacing IUD in that instance.    #Post-op: Doing well, meeting goals. Incisions healing well and chlorhexidine rash resolved.  - Continue cleaning navel and using something for moisture control. May return to work.   - Chlorhexidine added to allergy list    Patient seen with Dr. Sergei Plascencia MD  OBGYN PGY-1  5/22/2018, 10:21 AM      "

## 2018-05-22 NOTE — MR AVS SNAPSHOT
"              After Visit Summary   5/22/2018    Malika So    MRN: [id number]           Patient Information     Date Of Birth          1985        Visit Information        Provider Department      5/22/2018 9:45 AM Celeste Mai MD [facility]        Today's Diagnoses     Postoperative state    -  1       Follow-ups after your visit        Who to contact     Please call your clinic at [phone] to:    Ask questions about your health    Make or cancel appointments    Discuss your medicines    Learn about your test results    Speak to your doctor            Additional Information About Your Visit        MyChart Information     [employer] gives you secure access to your electronic health record. If you see a primary care provider, you can also send messages to your care team and make appointments. If you have questions, please call your primary care clinic.  If you do not have a primary care provider, please call [phone] and they will assist you.      [employer] is an electronic gateway that provides easy, online access to your medical records. With [employer], you can request a clinic appointment, read your test results, renew a prescription or communicate with your care team.     To access your existing account, please contact your [facility] or call [phone] for assistance.        Care EveryWhere ID     This is your Care EveryWhere ID. This could be used by other organizations to access your [place] medical records  [id number]        Your Vitals Were     Pulse Height BMI (Body Mass Index)             91 1.778 m (5' 10\") 31.81 kg/m2          Blood Pressure from Last 3 Encounters:   05/22/18 116/80   05/09/18 105/63   05/04/18 116/78    Weight from Last 3 Encounters:   05/22/18 100.6 kg (221 lb 11.2 oz)   05/09/18 102.3 kg (225 lb 8.5 oz)   05/04/18 104.1 kg (229 lb 9.6 oz)              Today, you had the following     No orders " found for display       Primary Care Provider Office Phone # Fax #    Josefina Welch [phone] [address]  [facility] [address]        Equal Access to Services     NATALIAROMEO HAYLEY : Aly lida tenorio pricillao Sosylvieali, waaxda luqadaha, qaybta kaalmada ademarkusda, brigitte carpenteralena preston. So [facility] [phone].    ATENCIÓN: Si habla español, tiene a rowe disposición servicios gratuitos de asistencia lingüística. Llame al [phone].    We comply with applicable federal civil rights laws and [place] laws. We do not discriminate on the basis of race, color, national origin, age, disability, sex, sexual orientation, or gender identity.            Thank you!     Thank you for choosing [facility]  for your care. Our goal is always to provide you with excellent care. Hearing back from our patients is one way we can continue to improve our services. Please take a few minutes to complete the written survey that you may receive in the mail after your visit with us. Thank you!             Your Updated Medication List - Protect others around you: Learn how to safely use, store and throw away your medicines at www.disposemymeds.org.          This list is accurate as of 5/22/18 11:59 PM.  Always use your most recent med list.                   Brand Name Dispense Instructions for use Diagnosis    ibuprofen 600 MG tablet    ADVIL/MOTRIN    30 tablet    Take 1 tablet (600 mg) by mouth every 6 hours as needed for pain (mild)    Status post bilateral salpingectomy       levonorgestrel 20 MCG/24HR IUD    MIRENA     1 each by Intrauterine route once        oxyCODONE-acetaminophen 5-325 MG per tablet    PERCOCET    10 tablet    Take 1-2 tablets by mouth every 4 hours as needed for pain (moderate to severe)    Status post bilateral salpingectomy       phentermine 15 MG capsule     30 capsule    Take 1 capsule (15 mg) by mouth every morning    Class 2 obesity with body mass  index (BMI) of 36.0 to 36.9 in adult, unspecified obesity type, unspecified whether serious comorbidity present       ranitidine 150 MG tablet    ZANTAC     Take 1 tablet (150 mg) by mouth At Bedtime        senna-docusate 8.6-50 MG per tablet    SENOKOT-S;PERICOLACE    30 tablet    Take 1-2 tablets by mouth 2 times daily Take while on oral narcotics to prevent or treat constipation.    Status post bilateral salpingectomy       topiramate 25 MG tablet    TOPAMAX    90 tablet    25mg at bedtime for week 1, 50mg at bedtime for week 2 and thereafter    Class 2 obesity with body mass index (BMI) of 36.0 to 36.9 in adult, unspecified obesity type, unspecified whether serious comorbidity present

## 2018-05-23 ENCOUNTER — TELEPHONE (OUTPATIENT)
Dept: OBGYN | Facility: CLINIC | Age: 33
End: 2018-05-23

## 2018-05-23 NOTE — TELEPHONE ENCOUNTER
Pt presented to clinic with paperwork to return to work. Per Dr. Mai's office visit note 5/22, pt can return to work now.     Dr. Francis signed paperwork in Dr. Mai's absence, returned to patient

## 2018-07-17 ENCOUNTER — DOCUMENTATION ONLY (OUTPATIENT)
Dept: LAB | Facility: CLINIC | Age: 33
End: 2018-07-17

## 2018-07-17 NOTE — PROGRESS NOTES
This patient has overdue labs. A letter was sent on 6/11/2018 and there has been no lab appointment made. If you still want these labs done, please have your care team contact the patient to make a lab appointment. Otherwise, please have the labs discontinued and close the encounter.    Thank you,  [facility]

## 2018-10-10 ENCOUNTER — MYC MEDICAL ADVICE (OUTPATIENT)
Dept: INTERNAL MEDICINE | Facility: CLINIC | Age: 33
End: 2018-10-10

## 2018-10-11 NOTE — TELEPHONE ENCOUNTER
Panel Management Review      Patient has the following on her problem list:     Depression / Dysthymia review    Measure:  Needs PHQ-9 score of 4 or less during index window.  Administer PHQ-9 and if score is 5 or more, send encounter to provider for next steps.    5 - 7 month window range:     PHQ-9 SCORE 5/18/2016 6/6/2017 5/4/2018   Total Score - - -   Total Score 15 19 12       If PHQ-9 recheck is 5 or more, route to provider for next steps.    Patient is due for:  PHQ9 and DAP      Composite cancer screening  Chart review shows that this patient is due/due soon for the following None  Summary:    Patient is due/failing the following:   PHQ9    Action needed:   Patient needs to do PHQ9.    Type of outreach:    Sent [employer] message.    Questions for provider review:    None                                                                                                                                    Marianela Parish MA       Chart routed to Care Team .

## 2018-12-14 ENCOUNTER — MYC MEDICAL ADVICE (OUTPATIENT)
Dept: FAMILY MEDICINE | Facility: CLINIC | Age: 33
End: 2018-12-14

## 2019-01-15 NOTE — TELEPHONE ENCOUNTER
Called and spoke with patient. Patient is unable to complete PHQ9 at moment but request a call back tomorrow @ 1:00 PM.    Katelynn Salinas MA

## 2019-01-16 NOTE — TELEPHONE ENCOUNTER
Called patient and left VM to call clinic to complete PHQ-9. Okay to speak to anyone on purple team.  Mey ELLIS CMA ([facility])

## 2019-01-18 NOTE — TELEPHONE ENCOUNTER
Multiple attempts made to contact patient. My chart message sent to patient.  Mey ELLIS CMA ([facility])

## 2019-04-02 ENCOUNTER — MYC REFILL (OUTPATIENT)
Dept: FAMILY MEDICINE | Facility: CLINIC | Age: 34
End: 2019-04-02

## 2019-04-02 DIAGNOSIS — F41.9 ANXIETY: ICD-10-CM

## 2019-04-04 RX ORDER — SERTRALINE HYDROCHLORIDE 100 MG/1
200 TABLET, FILM COATED ORAL DAILY
Qty: 60 TABLET | Refills: 0 | Status: SHIPPED | OUTPATIENT
Start: 2019-04-04 | End: 2019-04-24

## 2019-04-04 NOTE — TELEPHONE ENCOUNTER
"Prescription approved per [facility] Refill Protocol.    Requested Prescriptions   Signed Prescriptions Disp Refills     sertraline (ZOLOFT) 100 MG tablet 60 tablet 0     Sig: Take 2 tablets (200 mg) by mouth daily    SSRIs Protocol Passed - 4/3/2019  7:05 AM       Passed - Recent (12 mo) or future (30 days) visit within the authorizing provider's specialty    Patient had office visit in the last 12 months or has a visit in the next 30 days with authorizing provider or within the authorizing provider's specialty.  See \"Patient Info\" tab in inbasket, or \"Choose Columns\" in Meds & Orders section of the refill encounter.             Passed - Medication is active on med list       Passed - Patient is age 18 or older       Passed - No active pregnancy on record       Passed - No positive pregnancy test in last 12 months        Mey Turcios RN  [facility]    "

## 2019-04-24 ENCOUNTER — OFFICE VISIT (OUTPATIENT)
Dept: FAMILY MEDICINE | Facility: CLINIC | Age: 34
End: 2019-04-24
Payer: COMMERCIAL

## 2019-04-24 VITALS
HEIGHT: 67 IN | BODY MASS INDEX: 39.82 KG/M2 | DIASTOLIC BLOOD PRESSURE: 80 MMHG | SYSTOLIC BLOOD PRESSURE: 126 MMHG | WEIGHT: 253.7 LBS | HEART RATE: 77 BPM | TEMPERATURE: 98.7 F

## 2019-04-24 DIAGNOSIS — Z00.00 ENCOUNTER FOR ROUTINE ADULT HEALTH EXAMINATION WITHOUT ABNORMAL FINDINGS: Primary | ICD-10-CM

## 2019-04-24 DIAGNOSIS — F41.9 ANXIETY: ICD-10-CM

## 2019-04-24 DIAGNOSIS — E66.01 MORBID OBESITY DUE TO EXCESS CALORIES (H): ICD-10-CM

## 2019-04-24 DIAGNOSIS — D48.5 NEOPLASM OF UNCERTAIN BEHAVIOR OF SKIN: ICD-10-CM

## 2019-04-24 DIAGNOSIS — F32.0 MILD MAJOR DEPRESSION (H): ICD-10-CM

## 2019-04-24 DIAGNOSIS — Z13.220 LIPID SCREENING: ICD-10-CM

## 2019-04-24 PROCEDURE — 99395 PREV VISIT EST AGE 18-39: CPT | Performed by: PHYSICIAN ASSISTANT

## 2019-04-24 RX ORDER — CLONAZEPAM 1 MG/1
.5-1 TABLET ORAL 2 TIMES DAILY PRN
Qty: 20 TABLET | Refills: 0 | Status: SHIPPED | OUTPATIENT
Start: 2019-04-24 | End: 2022-09-23

## 2019-04-24 RX ORDER — SERTRALINE HYDROCHLORIDE 100 MG/1
200 TABLET, FILM COATED ORAL DAILY
Qty: 180 TABLET | Refills: 1 | Status: SHIPPED | OUTPATIENT
Start: 2019-04-24 | End: 2020-10-06

## 2019-04-24 ASSESSMENT — MIFFLIN-ST. JEOR: SCORE: 1885.4

## 2019-04-24 ASSESSMENT — PATIENT HEALTH QUESTIONNAIRE - PHQ9
5. POOR APPETITE OR OVEREATING: NOT AT ALL
SUM OF ALL RESPONSES TO PHQ QUESTIONS 1-9: 15

## 2019-04-24 ASSESSMENT — ANXIETY QUESTIONNAIRES
7. FEELING AFRAID AS IF SOMETHING AWFUL MIGHT HAPPEN: NEARLY EVERY DAY
6. BECOMING EASILY ANNOYED OR IRRITABLE: NEARLY EVERY DAY
5. BEING SO RESTLESS THAT IT IS HARD TO SIT STILL: NOT AT ALL
3. WORRYING TOO MUCH ABOUT DIFFERENT THINGS: NEARLY EVERY DAY
1. FEELING NERVOUS, ANXIOUS, OR ON EDGE: NEARLY EVERY DAY
GAD7 TOTAL SCORE: 15
2. NOT BEING ABLE TO STOP OR CONTROL WORRYING: NEARLY EVERY DAY

## 2019-04-24 NOTE — PATIENT INSTRUCTIONS
Continue zoloft 200 mg daily  Follow up with bariatric clinic - visit and labwork  Follow up with dermatology for full skin check    Preventive Health Recommendations  Female Ages 26 - 39  Yearly exam:   See your health care provider every year in order to    Review health changes.     Discuss preventive care.      Review your medicines if you your doctor has prescribed any.    Until age 30: Get a Pap test every three years (more often if you have had an abnormal result).    After age 30: Talk to your doctor about whether you should have a Pap test every 3 years or have a Pap test with HPV screening every 5 years.   You do not need a Pap test if your uterus was removed (hysterectomy) and you have not had cancer.  You should be tested each year for STDs (sexually transmitted diseases), if you're at risk.   Talk to your provider about how often to have your cholesterol checked.  If you are at risk for diabetes, you should have a diabetes test (fasting glucose).  Shots: Get a flu shot each year. Get a tetanus shot every 10 years.   Nutrition:     Eat at least 5 servings of fruits and vegetables each day.    Eat whole-grain bread, whole-wheat pasta and brown rice instead of white grains and rice.    Get adequate Calcium and Vitamin D.     Lifestyle    Exercise at least 150 minutes a week (30 minutes a day, 5 days of the week). This will help you control your weight and prevent disease.    Limit alcohol to one drink per day.    No smoking.     Wear sunscreen to prevent skin cancer.    See your dentist every six months for an exam and cleaning.

## 2019-04-24 NOTE — PROGRESS NOTES
SUBJECTIVE:   CC: Malika So is an 33 year old woman who presents for preventive health visit.     Healthy Habits:    Do you get at least three servings of calcium containing foods daily (dairy, green leafy vegetables, etc.)? yes    Amount of exercise or daily activities, outside of work: None, is active with kids    Problems taking medications regularly No    Medication side effects: No    Have you had an eye exam in the past two years? no    Do you see a dentist twice per year? Once    Do you have sleep apnea, excessive snoring or daytime drowsiness?no    *  No additional concerns today    Patient informed that anything we discuss that is not related to preventative medicine, may be billed for; patient verbalizes understanding.    ** follow up zoloft 200 mg  She was off zoloft for a while as she ran out, and has been tapering herself up. She feels this is why depression is worse now  She tolerates this and has been on this dose for years  She does not want to decrease     has had IUD for 3 years - due out at 5 years  Irregular bleeding with this - better than the first couple years on IUD  Liked nuvaring in the past    Today's PHQ-2 Score: see PHQ/COMPA   PHQ-2 ( 1999 [employer]) 1/11/2016 6/4/2015   Q1: Little interest or pleasure in doing things 2 0   Q2: Feeling down, depressed or hopeless 2 0   PHQ-2 Score 4 0       Abuse: Current or Past(Physical, Sexual or Emotional)- No  Do you feel safe in your environment? Yes    Social History     Tobacco Use     Smoking status: Never Smoker     Smokeless tobacco: Never Used   Substance Use Topics     Alcohol use: No     Alcohol/week: 0.5 oz     If you drink alcohol do you typically have >3 drinks per day or >7 drinks per week? No                     Reviewed orders with patient.  Reviewed health maintenance and updated orders accordingly - Yes  Labs reviewed in EPIC  BP Readings from Last 3 Encounters:   04/24/19 126/80   05/22/18 116/80   05/09/18 105/63     Wt Readings from Last 3 Encounters:   19 115.1 kg (253 lb 11.2 oz)   18 100.6 kg (221 lb 11.2 oz)   18 102.3 kg (225 lb 8.5 oz)                  Patient Active Problem List   Diagnosis     Mild major depression (H)     Anxiety     Non-specific low back pain     Morbid obesity (H)     Bariatric surgery status     GERD without esophagitis     Past Surgical History:   Procedure Laterality Date     C EACH ADD TOOTH EXTRACTION      multiple tooth extractions      SECTION N/A 2016    Procedure:  SECTION;  Surgeon: Kelli Osullivan MD;  Location: [facility] L[facility]     CHOLECYSTECTOMY, LAPOROSCOPIC       LAP ADJUSTABLE GASTRIC BAND      [facility] lost over 150 lb      LAPAROSCOPIC SALPINGECTOMY Bilateral 2018    Procedure: LAPAROSCOPIC SALPINGECTOMY;  Bilateral Laparoscopic Salpingectomy ;  Surgeon: Celeste Mai MD;  Location: [facility] OR     [facility]       TONSILLECTOMY         Social History     Tobacco Use     Smoking status: Never Smoker     Smokeless tobacco: Never Used   Substance Use Topics     Alcohol use: No     Alcohol/week: 0.5 oz     Family History   Problem Relation Age of Onset     C.A.D. Maternal Grandfather      Diabetes Maternal Grandfather      Hypertension Maternal Grandfather      Obesity Maternal Grandfather      Hyperlipidemia Maternal Grandfather      Coronary Artery Disease Maternal Grandfather      Colon Polyps Maternal Grandfather      Heart Failure Paternal Grandmother         CHF     Anemia Paternal Grandmother      Glaucoma Father         corneal transplant. congenital glaucoma     Obesity Maternal Grandmother      Other Cancer Maternal Grandmother         cirrosis of liver. non-drinker     Obesity Other         Had GI surgery     Hypertension Other         mother's side of family     Arthritis Other         both sides of family     Hypertension Other      Obesity Other            Mammogram not appropriate for this patient based on  age.    Pertinent mammograms are reviewed under the imaging tab.  History of abnormal Pap smear: NO - age 30-65 PAP every 5 years with negative HPV co-testing recommended  PAP / HPV Latest Ref Rng & Units 2016 2012 11/10/2010   PAP - NIL NIL NIL   HPV 16 DNA NEG Negative - -   HPV 18 DNA NEG Negative - -   OTHER HR HPV NEG Negative - -     Reviewed and updated as needed this visit by clinical staff  Tobacco  Allergies  Meds  Problems  Med Hx  Surg Hx  Fam Hx  Soc Hx          Reviewed and updated as needed this visit by Provider  Tobacco  Allergies  Meds  Problems  Med Hx  Surg Hx  Fam Hx  Soc Hx         Past Medical History:   Diagnosis Date     GERD without esophagitis      Hypertension     prior to weight loss     Latent tuberculosis     neg chest x-ray. treated with INH for 9 moniths     Mild major depression (H)     celexa 100 mg      Morbid obesity (H)      Urinary incontinence       Past Surgical History:   Procedure Laterality Date     C EACH ADD TOOTH EXTRACTION      multiple tooth extractions      SECTION N/A 2016    Procedure:  SECTION;  Surgeon: Kelli Osullivan MD;  [facility]: [facility]     CHOLECYSTECTOMY, LAPOROSCOPIC       LAP ADJUSTABLE GASTRIC BAND      [facility] lost over 150 lb      LAPAROSCOPIC SALPINGECTOMY Bilateral 2018    Procedure: LAPAROSCOPIC SALPINGECTOMY;  Bilateral Laparoscopic Salpingectomy ;  Surgeon: Celeste Mai MD;  Location:  OR     [facility]       TONSILLECTOMY         ROS:  CONSTITUTIONAL: NEGATIVE for fever, chills, change in weight  INTEGUMENTARU/SKIN: NEGATIVE for worrisome rashes, moles or lesions  EYES: NEGATIVE for vision changes or irritation  ENT: NEGATIVE for ear, mouth and throat problems  RESP: NEGATIVE for significant cough or SOB  BREAST: NEGATIVE for masses, tenderness or discharge  CV: NEGATIVE for chest pain, palpitations or peripheral edema  GI: NEGATIVE for nausea, abdominal pain, heartburn,  "or change in bowel habits  : NEGATIVE for unusual urinary or vaginal symptoms. Periods are regular.  MUSCULOSKELETAL: NEGATIVE for significant arthralgias or myalgia  NEURO: NEGATIVE for weakness, dizziness or paresthesias  PSYCHIATRIC: NEGATIVE for changes in mood or affect    OBJECTIVE:   /80   Pulse 77   Temp 98.7  F (37.1  C) (Tympanic)   Ht 1.697 m (5' 6.81\")   Wt 115.1 kg (253 lb 11.2 oz)   BMI 39.96 kg/m    EXAM:  GENERAL: healthy, alert and no distress  EYES: Eyes grossly normal to inspection, PERRL and conjunctivae and sclerae normal  HENT: ear canals and TM's normal, nose and mouth without ulcers or lesions  NECK: no adenopathy, no asymmetry, masses, or scars and thyroid normal to palpation  RESP: lungs clear to auscultation - no rales, rhonchi or wheezes  BREAST: normal without masses, tenderness or nipple discharge and no palpable axillary masses or adenopathy  CV: regular rate and rhythm, normal S1 S2, no S3 or S4, no murmur, click or rub, no peripheral edema and peripheral pulses strong  ABDOMEN: soft, nontender, no hepatosplenomegaly, no masses and bowel sounds normal   (female): normal female external genitalia, normal urethral meatus, vaginal mucosa, normal cervix/adnexa/uterus without masses or discharge POSITIVE can see IUD strings  MS: no gross musculoskeletal defects noted, no edema  SKIN: no suspicious lesions or rashes POSITIVE multiple freckles/moles, on back several dark black/brown moles  NEURO: Normal strength and tone, mentation intact and speech normal  BACK: no CVA tenderness, no paralumbar tenderness  PSYCH: mentation appears normal, affect normal/bright  LYMPH: no cervical, supraclavicular, axillary, or inguinal adenopathy    ASSESSMENT/PLAN:     ASSESSMENT/PLAN:      ICD-10-CM    1. Encounter for routine adult health examination without abnormal findings Z00.00    2. Anxiety F41.9 sertraline (ZOLOFT) 100 MG tablet     clonazePAM (KLONOPIN) 1 MG tablet   3. Lipid " "screening Z13.220 Lipid panel reflex to direct LDL Fasting   4. Neoplasm of uncertain behavior of skin D48.5 DERMATOLOGY REFERRAL   5. Morbid obesity due to excess calories (H) E66.01    6. Mild major depression (H) F32.0       would like refill of klonopin. Has not used recently. Discussed risks/benefits, she would like to use short term while things aren't going as well  She is Trying to not \"add more to her plate\" aka depression. Trying to do more things for herself    Patient Instructions   Continue zoloft 200 mg daily  Follow up with bariatric clinic - visit and labwork  Follow up with dermatology for full skin check      COUNSELING:   Reviewed preventive health counseling, as reflected in patient instructions       Regular exercise       Healthy diet/nutrition    BP Readings from Last 1 Encounters:   04/24/19 126/80     Estimated body mass index is 39.96 kg/m  as calculated from the following:    Height as of this encounter: 1.697 m (5' 6.81\").    Weight as of this encounter: 115.1 kg (253 lb 11.2 oz).      Weight management plan: Discussed healthy diet and exercise guidelines     reports that she has never smoked. She has never used smokeless tobacco.      Counseling Resources:  ATP IV Guidelines  Pooled Cohorts Equation Calculator  Breast Cancer Risk Calculator  FRAX Risk Assessment  ICSI Preventive Guidelines  Dietary Guidelines for Americans, 2010  USDA's MyPlate  ASA Prophylaxis  Lung CA Screening    Nenita Figueroa PA-C  [facility]  "

## 2019-04-25 ASSESSMENT — ANXIETY QUESTIONNAIRES: GAD7 TOTAL SCORE: 15

## 2019-11-08 ENCOUNTER — HEALTH MAINTENANCE LETTER (OUTPATIENT)
Age: 34
End: 2019-11-08

## 2020-02-21 DIAGNOSIS — F41.9 ANXIETY: Primary | ICD-10-CM

## 2020-02-21 RX ORDER — SERTRALINE HYDROCHLORIDE 100 MG/1
200 TABLET, FILM COATED ORAL DAILY
Qty: 60 TABLET | Refills: 1 | Status: SHIPPED | OUTPATIENT
Start: 2020-02-21 | End: 2021-07-24

## 2020-10-06 ENCOUNTER — OFFICE VISIT (OUTPATIENT)
Dept: PEDIATRICS | Facility: CLINIC | Age: 35
End: 2020-10-06
Payer: COMMERCIAL

## 2020-10-06 VITALS
HEIGHT: 67 IN | BODY MASS INDEX: 41.67 KG/M2 | SYSTOLIC BLOOD PRESSURE: 138 MMHG | HEART RATE: 80 BPM | TEMPERATURE: 98 F | OXYGEN SATURATION: 100 % | WEIGHT: 265.5 LBS | DIASTOLIC BLOOD PRESSURE: 84 MMHG

## 2020-10-06 DIAGNOSIS — G89.29 CHRONIC BILATERAL LOW BACK PAIN WITH BILATERAL SCIATICA: ICD-10-CM

## 2020-10-06 DIAGNOSIS — M25.60 MORNING JOINT STIFFNESS: ICD-10-CM

## 2020-10-06 DIAGNOSIS — F41.9 ANXIETY: ICD-10-CM

## 2020-10-06 DIAGNOSIS — F32.0 MILD MAJOR DEPRESSION (H): ICD-10-CM

## 2020-10-06 DIAGNOSIS — Z00.00 ROUTINE GENERAL MEDICAL EXAMINATION AT A HEALTH CARE FACILITY: Primary | ICD-10-CM

## 2020-10-06 DIAGNOSIS — Z13.1 SCREENING FOR DIABETES MELLITUS (DM): ICD-10-CM

## 2020-10-06 DIAGNOSIS — M54.41 CHRONIC BILATERAL LOW BACK PAIN WITH BILATERAL SCIATICA: ICD-10-CM

## 2020-10-06 DIAGNOSIS — Z13.6 CARDIOVASCULAR SCREENING; LDL GOAL LESS THAN 160: ICD-10-CM

## 2020-10-06 DIAGNOSIS — Z13.0 SCREENING FOR DISORDER OF BLOOD AND BLOOD-FORMING ORGANS: ICD-10-CM

## 2020-10-06 DIAGNOSIS — Z23 NEED FOR PROPHYLACTIC VACCINATION AND INOCULATION AGAINST INFLUENZA: ICD-10-CM

## 2020-10-06 DIAGNOSIS — M54.42 CHRONIC BILATERAL LOW BACK PAIN WITH BILATERAL SCIATICA: ICD-10-CM

## 2020-10-06 DIAGNOSIS — Z13.29 SCREENING FOR THYROID DISORDER: ICD-10-CM

## 2020-10-06 DIAGNOSIS — M54.10 RADICULAR PAIN OF BOTH LOWER EXTREMITIES: ICD-10-CM

## 2020-10-06 PROCEDURE — 90686 IIV4 VACC NO PRSV 0.5 ML IM: CPT | Performed by: NURSE PRACTITIONER

## 2020-10-06 PROCEDURE — 90471 IMMUNIZATION ADMIN: CPT | Performed by: NURSE PRACTITIONER

## 2020-10-06 PROCEDURE — 99395 PREV VISIT EST AGE 18-39: CPT | Mod: 25 | Performed by: NURSE PRACTITIONER

## 2020-10-06 PROCEDURE — 99214 OFFICE O/P EST MOD 30 MIN: CPT | Mod: 25 | Performed by: NURSE PRACTITIONER

## 2020-10-06 PROCEDURE — 96127 BRIEF EMOTIONAL/BEHAV ASSMT: CPT | Performed by: NURSE PRACTITIONER

## 2020-10-06 ASSESSMENT — MIFFLIN-ST. JEOR: SCORE: 1933.91

## 2020-10-06 ASSESSMENT — ANXIETY QUESTIONNAIRES
GAD7 TOTAL SCORE: 7
6. BECOMING EASILY ANNOYED OR IRRITABLE: MORE THAN HALF THE DAYS
3. WORRYING TOO MUCH ABOUT DIFFERENT THINGS: SEVERAL DAYS
1. FEELING NERVOUS, ANXIOUS, OR ON EDGE: SEVERAL DAYS
2. NOT BEING ABLE TO STOP OR CONTROL WORRYING: SEVERAL DAYS
7. FEELING AFRAID AS IF SOMETHING AWFUL MIGHT HAPPEN: SEVERAL DAYS
5. BEING SO RESTLESS THAT IT IS HARD TO SIT STILL: NOT AT ALL
IF YOU CHECKED OFF ANY PROBLEMS ON THIS QUESTIONNAIRE, HOW DIFFICULT HAVE THESE PROBLEMS MADE IT FOR YOU TO DO YOUR WORK, TAKE CARE OF THINGS AT HOME, OR GET ALONG WITH OTHER PEOPLE: SOMEWHAT DIFFICULT

## 2020-10-06 ASSESSMENT — PATIENT HEALTH QUESTIONNAIRE - PHQ9
SUM OF ALL RESPONSES TO PHQ QUESTIONS 1-9: 9
5. POOR APPETITE OR OVEREATING: SEVERAL DAYS

## 2020-10-06 NOTE — PATIENT INSTRUCTIONS
PLAN:   1.   Symptomatic therapy suggested: Continue current medications as prescribed.   Ask psychiatrist if Cymbalta would be a good option as could help with the pain   2.  Orders Placed This Encounter   Procedures     MR Lumbar Spine w/o Contrast     INFLUENZA VACCINE IM > 6 MONTHS VALENT IIV4 [66869]     JUST IN CASE     Lipid panel reflex to direct LDL Fasting     Comprehensive metabolic panel     TSH with free T4 reflex     CBC with platelets     Erythrocyte sedimentation rate auto     CRP inflammation     Anti Nuclear [place]erica IgG by IFA with Reflex     Rheumatoid factor     HLA-B27 Typing     3. Patient needs to follow up in if no improvement,or sooner if worsening of symptoms or other symptoms develop.  FOLLOW UP WITH SPECIALIST :Psychiatry  FUTURE LABS:       - Schedule a fasting blood draw   MRI of lumbar spine   I will place order. Please call [phone] to schedule.  Will follow up and/or notify patient of  results via My Chart to determine further need for followup  Work on weight loss  Regular exercise  Follow up office visit in one year for annual health maintenance exam, sooner PRN.    Preventive Health Recommendations  Female Ages 26 - 39  Yearly exam:   See your health care provider every year in order to    Review health changes.     Discuss preventive care.      Review your medicines if you your doctor has prescribed any.    Until age 30: Get a Pap test every three years (more often if you have had an abnormal result).    After age 30: Talk to your doctor about whether you should have a Pap test every 3 years or have a Pap test with HPV screening every 5 years.   You do not need a Pap test if your uterus was removed (hysterectomy) and you have not had cancer.  You should be tested each year for STDs (sexually transmitted diseases), if you're at risk.   Talk to your provider about how often to have your cholesterol checked.  If you are at risk for diabetes, you should have a diabetes test (fasting  glucose).  Shots: Get a flu shot each year. Get a tetanus shot every 10 years.   Nutrition:     Eat at least 5 servings of fruits and vegetables each day.    Eat whole-grain bread, whole-wheat pasta and brown rice instead of white grains and rice.    Get adequate Calcium and Vitamin D.     Lifestyle    Exercise at least 150 minutes a week (30 minutes a day, 5 days of the week). This will help you control your weight and prevent disease.    Limit alcohol to one drink per day.    No smoking.     Wear sunscreen to prevent skin cancer.    See your dentist every six months for an exam and cleaning.

## 2020-10-06 NOTE — PROGRESS NOTES
SUBJECTIVE:   CC: Malika So is an 34 year old woman who presents for preventive health visit.     Patient has been advised of split billing requirements and indicates understanding: Yes  Healthy Habits:     Getting at least 3 servings of Calcium per day:  Yes    Bi-annual eye exam:  NO    Dental care twice a year:  Yes    Sleep apnea or symptoms of sleep apnea:  None    Diet:  Regular (no restrictions)    Frequency of exercise:  None    Taking medications regularly:  Yes    Medication side effects:  None    PHQ-2 Total Score: 2    Additional concerns today:  No          Depression and Anxiety Follow-Up    How are you doing with your depression since your last visit? Worsened with stress     How are you doing with your anxiety since your last visit?  Worsened     Are you having other symptoms that might be associated with depression or anxiety? No    Have you had a significant life event? OTHER: family concerns     Do you have any concerns with your use of alcohol or other drugs? No  Work is very stressful and has a 8 yo with some anxiety and depression   He is very aggressive. He in school 5 days a week but has some delays   Sees psychiatry and has been right before Covid   Social History     Tobacco Use     Smoking status: Never Smoker     Smokeless tobacco: Never Used   Substance Use Topics     Alcohol use: No     Alcohol/week: 0.8 standard drinks     Drug use: No     PHQ 4/24/2019 10/6/2020 10/6/2020   PHQ-9 Total Score 15 9 9   Q9: Thoughts of better off dead/self-harm past 2 weeks Not at all Not at all Not at all     COMPA-7 SCORE 6/6/2017 4/24/2019 10/6/2020   Total Score - - -   Total Score 12 15 7     Last PHQ-9 10/6/2020   1.  Little interest or pleasure in doing things 2   2.  Feeling down, depressed, or hopeless 1   3.  Trouble falling or staying asleep, or sleeping too much 0   4.  Feeling tired or having little energy 1   5.  Poor appetite or overeating 3   6.  Feeling bad about yourself 1    7.  Trouble concentrating 1   8.  Moving slowly or restless 0   Q9: Thoughts of better off dead/self-harm past 2 weeks 0   PHQ-9 Total Score 9   Difficulty at work, home, or with people Somewhat difficult     COMPA-7  4/24/2019   1. Feeling nervous, anxious, or on edge 3   2. Not being able to stop or control worrying 3   3. Worrying too much about different things 3   4. Trouble relaxing 0   5. Being so restless that it is hard to sit still 0   6. Becoming easily annoyed or irritable 3   7. Feeling afraid, as if something awful might happen 3   COMPA-7 Total Score 15   If you checked any problems, how difficult have they made it for you to do your work, take care of things at home, or get along with other people? -       Suicide Assessment Five-step Evaluation and Treatment (SAFE-T)      Today's PHQ-2 Score:   PHQ-2 ( 1999 Pfizer) 10/6/2020   Q1: Little interest or pleasure in doing things 2   Q2: Feeling down, depressed or hopeless 2   PHQ-2 Score 4   Q1: Little interest or pleasure in doing things -   Q2: Feeling down, depressed or hopeless -   PHQ-2 Score -       Abuse: Current or Past (Physical, Sexual or Emotional) - No  Do you feel safe in your environment? Yes        Social History     Tobacco Use     Smoking status: Never Smoker     Smokeless tobacco: Never Used   Substance Use Topics     Alcohol use: No     Alcohol/week: 0.8 standard drinks     If you drink alcohol do you typically have >3 drinks per day or >7 drinks per week? No      Reviewed orders with patient.  Reviewed health maintenance and updated orders accordingly - Yes  Lab work is in process  Labs reviewed in EPIC  BP Readings from Last 3 Encounters:   10/06/20 138/84   04/24/19 126/80   05/22/18 116/80    Wt Readings from Last 3 Encounters:   10/06/20 120.4 kg (265 lb 8 oz)   04/24/19 115.1 kg (253 lb 11.2 oz)   05/22/18 100.6 kg (221 lb 11.2 oz)                  Patient Active Problem List   Diagnosis     Mild major depression (H)     Anxiety      Non-specific low back pain     Morbid obesity (H)     Bariatric surgery status     GERD without esophagitis     Past Surgical History:   Procedure Laterality Date     C EACH ADD TOOTH EXTRACTION      multiple tooth extractions      SECTION N/A 2016    Procedure:  SECTION;  Surgeon: Kelli Osullivan MD;  [facility]: [facility]     CHOLECYSTECTOMY, LAPOROSCOPIC       LAP ADJUSTABLE GASTRIC BAND      [facility] lost over 150 lb      LAPAROSCOPIC SALPINGECTOMY Bilateral 2018    Procedure: LAPAROSCOPIC SALPINGECTOMY;  Bilateral Laparoscopic Salpingectomy ;  Surgeon: Celeste Mai MD;  Location:  OR     [facility]       TONSILLECTOMY         Social History     Tobacco Use     Smoking status: Never Smoker     Smokeless tobacco: Never Used   Substance Use Topics     Alcohol use: No     Alcohol/week: 0.8 standard drinks     Family History   Problem Relation Age of Onset     C.A.D. Maternal Grandfather      Diabetes Maternal Grandfather      Hypertension Maternal Grandfather      Obesity Maternal Grandfather      Hyperlipidemia Maternal Grandfather      Coronary Artery Disease Maternal Grandfather      Colon Polyps Maternal Grandfather      Heart Failure Paternal Grandmother         CHF     Anemia Paternal Grandmother      Glaucoma Father         corneal transplant. congenital glaucoma     Obesity Maternal Grandmother      Other Cancer Maternal Grandmother         cirrosis of liver. non-drinker     Obesity Other         Had GI surgery     Hypertension Other         mother's side of family     Arthritis Other         both sides of family     Hypertension Other      Obesity Other          Current Outpatient Medications   Medication Sig Dispense Refill     clonazePAM (KLONOPIN) 1 MG tablet Take 0.5-1 tablets (0.5-1 mg) by mouth 2 times daily as needed for anxiety - use sparingly 20 tablet 0     levonorgestrel (MIRENA) 20 MCG/24HR IUD 1 each by Intrauterine route once       sertraline 100  MG PO tablet Take 2 tablets (200 mg) by mouth daily 60 tablet 1     sertraline (ZOLOFT) 100 MG tablet Take 2 tablets (200 mg) by mouth daily 180 tablet 1     Allergies   Allergen Reactions     Adhesive Tape      blisters     Chlorhexidine      Pruritic rash     Fenugreek [Trigonella Foenum-Graecum] Hives     Ragweeds Hives       Mammogram not appropriate for this patient based on age.    Pertinent mammograms are reviewed under the imaging tab.  History of abnormal Pap smear: NO - age 30-65 PAP every 5 years with negative HPV co-testing recommended  PAP / HPV Latest Ref Rng & Units 2016 2012 11/10/2010   PAP - NIL NIL NIL   HPV 16 DNA NEG Negative - -   HPV 18 DNA NEG Negative - -   OTHER HR HPV NEG Negative - -     Reviewed and updated as needed this visit by clinical staff  Tobacco  Allergies  Meds   Med Hx  Surg Hx  Fam Hx  Soc Hx        Reviewed and updated as needed this visit by Provider                Past Medical History:   Diagnosis Date     GERD without esophagitis      Hypertension     prior to weight loss     Latent tuberculosis     neg chest x-ray. treated with INH for 9 moniths     Mild major depression (H)     celexa 100 mg      Morbid obesity (H)      Urinary incontinence       Past Surgical History:   Procedure Laterality Date     C EACH ADD TOOTH EXTRACTION      multiple tooth extractions      SECTION N/A 2016    Procedure:  SECTION;  Surgeon: Kelli Osullivan MD;  [facility]     CHOLECYSTECTOMY, LAPOROSCOPIC       LAP ADJUSTABLE GASTRIC BAND      [facility] lost over 150 lb      LAPAROSCOPIC SALPINGECTOMY Bilateral 2018    Procedure: LAPAROSCOPIC SALPINGECTOMY;  Bilateral Laparoscopic Salpingectomy ;  Surgeon: Celeste Mai MD;  Location:  OR     [facility]       TONSILLECTOMY         Review of Systems  CONSTITUTIONAL:POSITIVE  for weight gain and NEGATIVE  for sweats  INTEGUMENTARY/SKIN: NEGATIVE for worrisome rashes, moles or  "lesions  EYES: NEGATIVE for vision changes or irritation  ENT: NEGATIVE for ear, mouth and throat problems  RESP:NEGATIVE for significant cough or SOB  BREAST: NEGATIVE for masses, tenderness or discharge  CV: POSITIVE for palpitations and seems to more with anxiety  and NEGATIVE for chest pain/chest pressure, diaphoresis, dyspnea on exertion and lower extremity edema  GI: NEGATIVE for nausea, abdominal pain, heartburn, or change in bowel habits   female: no unusual urinary symptoms, no unusual vaginal symptoms and menses: has IUD   MUSCULOSKELETAL:POSITIVE  for back pain for about 5 years ever since remembers pulling her back. Occasional radicular pain. Can not run because of the pain in her back  and No family hx RA or Lupus. Does have morning stiffness but mainly her lower back NEGATIVE for joint swelling  and joint warmth   NEURO: POSITIVE for radicular pain into bilateral legs  and NEGATIVE for involuntary movements, gait disturbance and loss of consciousness  ENDOCRINE: NEGATIVE for temperature intolerance, skin/hair changes  HEME/ALLERGY/IMMUNE: NEGATIVE for bleeding problems  PSYCHIATRIC: POSITIVE forHx anxiety, Hx depression and sees psychiatry  and NEGATIVE forthoughts of hurting someone else and thoughts of self harm      OBJECTIVE:   /84 (BP Location: Right arm, Patient Position: Sitting, Cuff Size: Adult Large)   Pulse 80   Temp 98  F (36.7  C) (Temporal)   Ht 1.697 m (5' 6.81\")   Wt 120.4 kg (265 lb 8 oz)   LMP  (LMP Unknown)   SpO2 100%   BMI 41.82 kg/m     Wt Readings from Last 4 Encounters:   10/06/20 120.4 kg (265 lb 8 oz)   04/24/19 115.1 kg (253 lb 11.2 oz)   05/22/18 100.6 kg (221 lb 11.2 oz)   05/09/18 102.3 kg (225 lb 8.5 oz)       Physical Exam   GENERAL: healthy, alert and no distress  EYES: Eyes grossly normal to inspection, PERRL and conjunctivae and sclerae normal  HENT: ear canals and TM's normal, nose and mouth without ulcers or lesions  NECK: no adenopathy, no asymmetry, " masses, or scars and thyroid normal to palpation  RESP: lungs clear to auscultation - no rales, rhonchi or wheezes  BREAST: normal without masses, tenderness or nipple discharge and no palpable axillary masses or adenopathy  CV: regular rates and rhythm, no murmur, click or rub, peripheral pulses strong and no peripheral edema  ABDOMEN: soft, nontender, no hepatosplenomegaly, no masses and bowel sounds normal   (female): normal female external genitalia, normal urethral meatus, vaginal mucosa pink, moist, well rugated, and normal cervix/adnexa/uterus without masses or discharge  MS: no gross musculoskeletal defects noted, no edema  MS: Lumbosacral spine area reveals no local tenderness or mass.  Full and painless lumbosacral range of motion is noted. Straight leg raise is negative at 90 degrees on both sides. DTR's, motor strength and sensation normal, including heel and toe gait.  Peripheral pulses are palpable. Hips and knees have full range of motion without pain.  SKIN: no suspicious lesions or rashes  NEURO: Normal strength and tone, mentation intact and speech normal  PSYCH: mentation appears normal, affect normal/bright  LYMPH: no cervical, supraclavicular, axillary, or inguinal adenopathy    Diagnostic Test Results:  Labs reviewed in Epic  Pending orders and results       ASSESSMENT/PLAN:   Malika was seen today for physical.    Diagnoses and all orders for this visit:    Routine general medical examination at a health care facility  -     INFLUENZA VACCINE IM > 6 MONTHS VALENT IIV4 [85719]; Future  -     JUST IN CASE; Future  -     Lipid panel reflex to direct LDL Fasting; Future  -     Comprehensive metabolic panel; Future  -     TSH with free T4 reflex; Future  -     CBC with platelets; Future    Mild major depression (H)  FOLLOW UP WITH SPECIALIST :Psychiatry    Anxiety  FOLLOW UP WITH SPECIALIST :Psychiatry    CARDIOVASCULAR SCREENING; LDL GOAL LESS THAN 160  -     Lipid panel reflex to direct LDL  "Fasting; Future    Screening for diabetes mellitus (DM)  -     Comprehensive metabolic panel; Future    Screening for thyroid disorder  -     TSH with free T4 reflex; Future    Screening for disorder of blood and blood-forming organs  -     CBC with platelets; Future    Radicular pain of both lower extremities  -     MR Lumbar Spine w/o Contrast; Future  Will follow up and/or notify patient of  results via My Chart to determine further need for followup      Morning joint stiffness  -     Erythrocyte sedimentation rate auto; Future  -     CRP inflammation; Future  -     Anti Nuclear [employer] IgG by IFA with Reflex; Future  -     Rheumatoid factor; Future  -     HLA-B27 Typing; Future  Will follow up and/or notify patient of  results via My Chart to determine further need for followup      Chronic bilateral low back pain with bilateral sciatica  -     MR Lumbar Spine w/o Contrast; Future  -     HLA-B27 Typing; Future  Will follow up and/or notify patient of  results via My Chart to determine further need for followup      Need for prophylactic vaccination and inoculation against influenza  -     INFLUENZA VACCINE IM > 6 MONTHS VALENT IIV4 [88664]  -     Vaccine Administration, Initial [35533]        Patient has been advised of split billing requirements and indicates understanding: Yes  COUNSELING:  Reviewed preventive health counseling, as reflected in patient instructions  Special attention given to:        Regular exercise       Healthy diet/nutrition       Vision screening       Immunizations    Vaccinated for: Influenza             Contraception       Family planning       Osteoporosis Prevention/Bone Health    Estimated body mass index is 41.82 kg/m  as calculated from the following:    Height as of this encounter: 1.697 m (5' 6.81\").    Weight as of this encounter: 120.4 kg (265 lb 8 oz).    Weight management plan: Discussed healthy diet and exercise guidelines    She reports that she has never smoked. She has " never used smokeless tobacco.      Counseling Resources:  ATP IV Guidelines  Pooled Cohorts Equation Calculator  Breast Cancer Risk Calculator  BRCA-Related Cancer Risk Assessment: FHS-7 Tool  FRAX Risk Assessment  ICSI Preventive Guidelines  Dietary Guidelines for Americans, 2010  USDA's MyPlate  ASA Prophylaxis  Lung CA Screening    MATT [facility]

## 2020-10-06 NOTE — PROGRESS NOTES
"   SUBJECTIVE:   CC: Malika So is an 34 year old woman who presents for preventive health visit.     {Split Bill scripting  The purpose of this visit is to discuss your medical history and prevent health problems before you are sick. You may be responsible for a co-pay, coinsurance, or deductible if your visit today includes services such as checking on a sore throat, having an x-ray or lab test, or treating and evaluating a new or existing condition :228543}  Patient has been advised of split billing requirements and indicates understanding: {Yes and No:903685}  Healthy Habits:    Do you get at least three servings of calcium containing foods daily (dairy, green leafy vegetables, etc.)? { :861162::\"yes\"}    Amount of exercise or daily activities, outside of work: { :619632}    Problems taking medications regularly { :719787::\"No\"}    Medication side effects: { :832062::\"No\"}    Have you had an eye exam in the past two years? { :231264}    Do you see a dentist twice per year? { :342143}    Do you have sleep apnea, excessive snoring or daytime drowsiness?{ :673218}  {Outside tests to abstract? :275038}    {additional problems to add (Optional):150258}    Today's PHQ-2 Score:   PHQ-2 ( 1999 Pfizer) 10/5/2020 4/24/2019   Q1: Little interest or pleasure in doing things 1 2   Q2: Feeling down, depressed or hopeless 1 2   PHQ-2 Score 2 4   Q1: Little interest or pleasure in doing things Several days -   Q2: Feeling down, depressed or hopeless Several days -   PHQ-2 Score 2 -     {PHQ-2 LOOK IN ASSESSMENTS (Optional) :137887}  Abuse: Current or Past(Physical, Sexual or Emotional)- {YES/NO/NA:014543}  Do you feel safe in your environment? {YES/NO/NA:602404}        Social History     Tobacco Use     Smoking status: Never Smoker     Smokeless tobacco: Never Used   Substance Use Topics     Alcohol use: No     Alcohol/week: 0.8 standard drinks     If you drink alcohol do you typically have >3 drinks per day or >7 " "drinks per week? {ETOH :561085}                     Reviewed orders with patient.  Reviewed health maintenance and updated orders accordingly - {Yes/No:297022::\"Yes\"}  {Chronicprobdata (Optional):910607}    {Mammo Decision Support (Optional):536889}    Pertinent mammograms are reviewed under the imaging tab.  History of abnormal Pap smear: {PAP HX:770594}  PAP / HPV Latest Ref Rng & Units 2/24/2016 4/6/2012 11/10/2010   PAP - NIL NIL NIL   HPV 16 DNA NEG Negative - -   HPV 18 DNA NEG Negative - -   OTHER HR HPV NEG Negative - -     Reviewed and updated as needed this visit by clinical staff                 Reviewed and updated as needed this visit by Provider                {HISTORY OPTIONS (Optional):865163}    ROS:  { :030617}    OBJECTIVE:   There were no vitals taken for this visit.  EXAM:  {Exam Choices:966749}    {Diagnostic Test Results (Optional):859447::\"Diagnostic Test Results:\",\"Labs reviewed in Epic\"}    ASSESSMENT/PLAN:   {Diag Picklist:277146}    Patient has been advised of split billing requirements and indicates understanding: {YES / NO:369081::\"Yes\"}  COUNSELING:   {FEMALE COUNSELING MESSAGES:465088::\"Reviewed preventive health counseling, as reflected in patient instructions\"}    Estimated body mass index is 39.96 kg/m  as calculated from the following:    Height as of 4/24/19: 1.697 m (5' 6.81\").    Weight as of 4/24/19: 115.1 kg (253 lb 11.2 oz).    {Weight Management Plan (ACO) Complete if BMI is abnormal-  Ages 18-64  BMI >24.9.  Age 65+ with BMI <23 or >30 (Optional):483516}    She reports that she has never smoked. She has never used smokeless tobacco.      Counseling Resources:  ATP IV Guidelines  Pooled Cohorts Equation Calculator  Breast Cancer Risk Calculator  BRCA-Related Cancer Risk Assessment: FHS-7 Tool  FRAX Risk Assessment  ICSI Preventive Guidelines  Dietary Guidelines for Americans, 2010  USDA's MyPlate  ASA Prophylaxis  Lung CA Screening    Selina Lancaster, APRN CNP  M " [facility]

## 2020-10-07 DIAGNOSIS — Z13.29 SCREENING FOR THYROID DISORDER: ICD-10-CM

## 2020-10-07 DIAGNOSIS — Z13.6 CARDIOVASCULAR SCREENING; LDL GOAL LESS THAN 160: ICD-10-CM

## 2020-10-07 DIAGNOSIS — M25.60 MORNING JOINT STIFFNESS: ICD-10-CM

## 2020-10-07 DIAGNOSIS — Z13.1 SCREENING FOR DIABETES MELLITUS (DM): ICD-10-CM

## 2020-10-07 DIAGNOSIS — M54.42 CHRONIC BILATERAL LOW BACK PAIN WITH BILATERAL SCIATICA: ICD-10-CM

## 2020-10-07 DIAGNOSIS — Z00.00 ROUTINE GENERAL MEDICAL EXAMINATION AT A HEALTH CARE FACILITY: ICD-10-CM

## 2020-10-07 DIAGNOSIS — M54.41 CHRONIC BILATERAL LOW BACK PAIN WITH BILATERAL SCIATICA: ICD-10-CM

## 2020-10-07 DIAGNOSIS — Z13.0 SCREENING FOR DISORDER OF BLOOD AND BLOOD-FORMING ORGANS: ICD-10-CM

## 2020-10-07 DIAGNOSIS — G89.29 CHRONIC BILATERAL LOW BACK PAIN WITH BILATERAL SCIATICA: ICD-10-CM

## 2020-10-07 LAB
ALBUMIN SERPL-MCNC: 3.8 G/DL (ref 3.4–5)
ALP SERPL-CCNC: 92 U/L (ref 40–150)
ALT SERPL W P-5'-P-CCNC: 44 U/L (ref 0–50)
ANION GAP SERPL CALCULATED.3IONS-SCNC: 4 MMOL/L (ref 3–14)
AST SERPL W P-5'-P-CCNC: 29 U/L (ref 0–45)
BILIRUB SERPL-MCNC: 0.4 MG/DL (ref 0.2–1.3)
BUN SERPL-MCNC: 15 MG/DL (ref 7–30)
CALCIUM SERPL-MCNC: 9.1 MG/DL (ref 8.5–10.1)
CHLORIDE SERPL-SCNC: 105 MMOL/L (ref 94–109)
CHOLEST SERPL-MCNC: 163 MG/DL
CO2 SERPL-SCNC: 30 MMOL/L (ref 20–32)
CREAT SERPL-MCNC: 0.72 MG/DL (ref 0.52–1.04)
CRP SERPL-MCNC: 4 MG/L (ref 0–8)
ERYTHROCYTE [DISTWIDTH] IN BLOOD BY AUTOMATED COUNT: 14.5 % (ref 10–15)
ERYTHROCYTE [SEDIMENTATION RATE] IN BLOOD BY WESTERGREN METHOD: 9 MM/H (ref 0–20)
GFR SERPL CREATININE-BSD FRML MDRD: >90 ML/MIN/{1.73_M2}
GLUCOSE SERPL-MCNC: 81 MG/DL (ref 70–99)
HCT VFR BLD AUTO: 41.4 % (ref 35–47)
HDLC SERPL-MCNC: 69 MG/DL
HGB BLD-MCNC: 13.7 G/DL (ref 11.7–15.7)
LDLC SERPL CALC-MCNC: 82 MG/DL
MCH RBC QN AUTO: 30.3 PG (ref 26.5–33)
MCHC RBC AUTO-ENTMCNC: 33.1 G/DL (ref 31.5–36.5)
MCV RBC AUTO: 92 FL (ref 78–100)
NONHDLC SERPL-MCNC: 94 MG/DL
PLATELET # BLD AUTO: 246 10E9/L (ref 150–450)
POTASSIUM SERPL-SCNC: 4.3 MMOL/L (ref 3.4–5.3)
PROT SERPL-MCNC: 7.2 G/DL (ref 6.8–8.8)
RBC # BLD AUTO: 4.52 10E12/L (ref 3.8–5.2)
SODIUM SERPL-SCNC: 139 MMOL/L (ref 133–144)
TRIGL SERPL-MCNC: 61 MG/DL
TSH SERPL DL<=0.005 MIU/L-ACNC: 2.3 MU/L (ref 0.4–4)
WBC # BLD AUTO: 7.9 10E9/L (ref 4–11)

## 2020-10-07 PROCEDURE — 85027 COMPLETE CBC AUTOMATED: CPT | Performed by: NURSE PRACTITIONER

## 2020-10-07 PROCEDURE — 86038 ANTINUCLEAR ANTIBODIES: CPT | Performed by: NURSE PRACTITIONER

## 2020-10-07 PROCEDURE — 86431 RHEUMATOID FACTOR QUANT: CPT | Performed by: NURSE PRACTITIONER

## 2020-10-07 PROCEDURE — 81374 HLA I TYPING 1 ANTIGEN LR: CPT | Performed by: NURSE PRACTITIONER

## 2020-10-07 PROCEDURE — 36415 COLL VENOUS BLD VENIPUNCTURE: CPT | Performed by: NURSE PRACTITIONER

## 2020-10-07 PROCEDURE — 85652 RBC SED RATE AUTOMATED: CPT | Performed by: NURSE PRACTITIONER

## 2020-10-07 PROCEDURE — 80053 COMPREHEN METABOLIC PANEL: CPT | Performed by: NURSE PRACTITIONER

## 2020-10-07 PROCEDURE — 84443 ASSAY THYROID STIM HORMONE: CPT | Performed by: NURSE PRACTITIONER

## 2020-10-07 PROCEDURE — 86140 C-REACTIVE PROTEIN: CPT | Performed by: NURSE PRACTITIONER

## 2020-10-07 PROCEDURE — 80061 LIPID PANEL: CPT | Performed by: NURSE PRACTITIONER

## 2020-10-07 ASSESSMENT — ANXIETY QUESTIONNAIRES: GAD7 TOTAL SCORE: 7

## 2020-10-08 LAB
ANA SER QL IF: NEGATIVE
RHEUMATOID FACT SER NEPH-ACNC: <7 IU/ML (ref 0–20)

## 2020-10-08 NOTE — RESULT ENCOUNTER NOTE
Pranay So,    Attached are your test results.  Rheumatoid marker is negative which is good news    Please contact us if you have any questions.    Selina Lancaster, CNP

## 2020-10-08 NOTE — RESULT ENCOUNTER NOTE
Pranay So,    Attached are your test results.  -Normal red blood cell (hgb) levels, normal white blood cell count and normal platelet levels.  -Cholesterol levels (LDL,HDL, Triglycerides) are normal.  ADVISE: rechecking in 1 year.   -Liver and gallbladder tests are normal (ALT,AST, Alk phos, bilirubin), kidney function is normal (Cr, GFR), sodium is normal, potassium is normal, calcium is normal, glucose is normal.  -TSH (thyroid stimulating hormone) level is normal which indicates normal thyroid function.  Inflammatory markers are normal   Some labs still pending    Please contact us if you have any questions.    Selina Lancaster, CNP

## 2020-10-12 LAB
B LOCUS: NORMAL
B27TEST METHOD: NORMAL

## 2020-10-23 ENCOUNTER — HOSPITAL ENCOUNTER (OUTPATIENT)
Dept: MRI IMAGING | Facility: CLINIC | Age: 35
Discharge: HOME | End: 2020-10-23
Attending: NURSE PRACTITIONER | Admitting: NURSE PRACTITIONER
Payer: COMMERCIAL

## 2020-10-23 DIAGNOSIS — M54.42 CHRONIC BILATERAL LOW BACK PAIN WITH BILATERAL SCIATICA: ICD-10-CM

## 2020-10-23 DIAGNOSIS — M54.41 CHRONIC BILATERAL LOW BACK PAIN WITH BILATERAL SCIATICA: ICD-10-CM

## 2020-10-23 DIAGNOSIS — G89.29 CHRONIC BILATERAL LOW BACK PAIN WITH BILATERAL SCIATICA: ICD-10-CM

## 2020-10-23 DIAGNOSIS — M54.10 RADICULAR PAIN OF BOTH LOWER EXTREMITIES: ICD-10-CM

## 2020-10-23 PROCEDURE — 72148 MRI LUMBAR SPINE W/O DYE: CPT

## 2020-10-25 NOTE — RESULT ENCOUNTER NOTE
Pranay So,    Attached are your test results.  MRI of lumbar shows an area of degenerative changes with signs of inflammation. I am going to refer you to orthopedics and see what they recommend    Please contact us if you have any questions.    Selina Lancaster, CNP

## 2021-04-09 ENCOUNTER — OFFICE VISIT (OUTPATIENT)
Dept: FAMILY MEDICINE | Facility: CLINIC | Age: 36
End: 2021-04-09
Payer: COMMERCIAL

## 2021-04-09 ENCOUNTER — ANCILLARY PROCEDURE (OUTPATIENT)
Dept: GENERAL RADIOLOGY | Facility: CLINIC | Age: 36
End: 2021-04-09
Attending: NURSE PRACTITIONER
Payer: COMMERCIAL

## 2021-04-09 VITALS
HEART RATE: 68 BPM | SYSTOLIC BLOOD PRESSURE: 126 MMHG | RESPIRATION RATE: 18 BRPM | BODY MASS INDEX: 47.09 KG/M2 | TEMPERATURE: 97.8 F | HEIGHT: 66 IN | WEIGHT: 293 LBS | OXYGEN SATURATION: 99 % | DIASTOLIC BLOOD PRESSURE: 85 MMHG

## 2021-04-09 DIAGNOSIS — M79.671 RIGHT FOOT PAIN: ICD-10-CM

## 2021-04-09 DIAGNOSIS — S92.154A CLOSED NONDISPLACED AVULSION FRACTURE OF RIGHT TALUS, INITIAL ENCOUNTER: ICD-10-CM

## 2021-04-09 DIAGNOSIS — M79.671 RIGHT FOOT PAIN: Primary | ICD-10-CM

## 2021-04-09 DIAGNOSIS — M72.2 PLANTAR FASCIITIS: ICD-10-CM

## 2021-04-09 PROCEDURE — 73630 X-RAY EXAM OF FOOT: CPT | Mod: RT | Performed by: RADIOLOGY

## 2021-04-09 PROCEDURE — 99213 OFFICE O/P EST LOW 20 MIN: CPT | Performed by: NURSE PRACTITIONER

## 2021-04-09 RX ORDER — BUPROPION HYDROCHLORIDE 100 MG/1
TABLET, EXTENDED RELEASE ORAL
COMMUNITY
Start: 2021-03-24

## 2021-04-09 RX ORDER — NAPROXEN 500 MG/1
500 TABLET ORAL 2 TIMES DAILY PRN
Qty: 60 TABLET | Refills: 1 | Status: SHIPPED | OUTPATIENT
Start: 2021-04-09 | End: 2023-10-24

## 2021-04-09 RX ORDER — TRAZODONE HYDROCHLORIDE 100 MG/1
TABLET ORAL
COMMUNITY
Start: 2021-03-24

## 2021-04-09 ASSESSMENT — PAIN SCALES - GENERAL: PAINLEVEL: EXTREME PAIN (9)

## 2021-04-09 ASSESSMENT — PATIENT HEALTH QUESTIONNAIRE - PHQ9: SUM OF ALL RESPONSES TO PHQ QUESTIONS 1-9: 8

## 2021-04-09 ASSESSMENT — MIFFLIN-ST. JEOR: SCORE: 2062.11

## 2021-04-09 NOTE — PROGRESS NOTES
Lolly Daly is a 35 year old who presents for the following health issues     HPI     Musculoskeletal problem/pain  Onset/Duration: 2-3 months  Description  Location: foot - right  Joint Swelling: no  Redness: no  Pain: YES  Warmth: YES  Intensity:  moderate  Progression of Symptoms:  worsening  Accompanying signs and symptoms:   Fevers: no  Numbness/tingling/weakness: no  History  Trauma to the area: no  Recent illness:  no  Previous similar problem: no  Previous evaluation:  no  Precipitating or alleviating factors:  Aggravating factors include: none  Therapies tried and outcome: heat, ice and ibuprofen, tylenol, rolling foot on cold water bottle, changed shoes and slippers but nothing is helping    Definitely has first step pain but pain does not really completely go away when she gets moving  Had started tread mill a couple months ago for 30 minutes a day   Also is a nurse on Adult oncology unit      Labs reviewed in EPIC  BP Readings from Last 3 Encounters:   21 126/85   10/06/20 138/84   19 126/80    Wt Readings from Last 3 Encounters:   21 135 kg (297 lb 11.2 oz)   10/06/20 120.4 kg (265 lb 8 oz)   19 115.1 kg (253 lb 11.2 oz)                  Patient Active Problem List   Diagnosis     Mild major depression (H)     Anxiety     Non-specific low back pain     Morbid obesity (H)     Bariatric surgery status     GERD without esophagitis     Past Surgical History:   Procedure Laterality Date     C EACH ADD TOOTH EXTRACTION      multiple tooth extractions      SECTION N/A 2016    Procedure:  SECTION;  Surgeon: Kelli Osullivan MD;  Location: [facility] L[facility]     CHOLECYSTECTOMY, LAPOROSCOPIC       LAP ADJUSTABLE GASTRIC BAND      [facility] lost over 150 lb      LAPAROSCOPIC SALPINGECTOMY Bilateral 2018    Procedure: LAPAROSCOPIC SALPINGECTOMY;  Bilateral Laparoscopic Salpingectomy ;  Surgeon: Celeste Mai MD;  Location:  OR     [facility]        "TONSILLECTOMY  1995       Social History     Tobacco Use     Smoking status: Never Smoker     Smokeless tobacco: Never Used   Substance Use Topics     Alcohol use: No     Alcohol/week: 0.8 standard drinks     Family History   Problem Relation Age of Onset     C.A.D. Maternal Grandfather      Diabetes Maternal Grandfather      Hypertension Maternal Grandfather      Obesity Maternal Grandfather      Hyperlipidemia Maternal Grandfather      Coronary Artery Disease Maternal Grandfather      Colon Polyps Maternal Grandfather      Heart Failure Paternal Grandmother         CHF     Anemia Paternal Grandmother      Glaucoma Father         corneal transplant. congenital glaucoma     Obesity Maternal Grandmother      Other Cancer Maternal Grandmother         cirrosis of liver. non-drinker     Obesity Other         Had GI surgery     Hypertension Other         mother's side of family     Arthritis Other         both sides of family     Hypertension Other      Obesity Other          Current Outpatient Medications   Medication Sig Dispense Refill     buPROPion (WELLBUTRIN SR) 100 MG 12 hr tablet        clonazePAM (KLONOPIN) 1 MG tablet Take 0.5-1 tablets (0.5-1 mg) by mouth 2 times daily as needed for anxiety - use sparingly 20 tablet 0     levonorgestrel (MIRENA) 20 MCG/24HR IUD 1 each by Intrauterine route once       sertraline 100 MG PO tablet Take 2 tablets (200 mg) by mouth daily 60 tablet 1     traZODone (DESYREL) 100 MG tablet        Allergies   Allergen Reactions     Adhesive Tape      blisters     Chlorhexidine      Pruritic rash     Fenugreek [Trigonella Foenum-Graecum] Hives     Ragweeds Hives             Review of Systems   Constitutional, HEENT, cardiovascular, pulmonary, gi and gu systems are negative, except as otherwise noted.      Objective    /85 (BP Location: Right arm, Patient Position: Sitting, Cuff Size: Adult Large)   Pulse 68   Temp 97.8  F (36.6  C) (Oral)   Resp 18   Ht 1.676 m (5' 6\")   Wt " 135 kg (297 lb 11.2 oz)   SpO2 99%   BMI 48.05 kg/m    Body mass index is 48.05 kg/m .  Physical Exam   GENERAL APPEARANCE: alert, active, no distress and Nourishment obese   NECK: no adenopathy  RESP: lungs clear to auscultation - no rales, rhonchi or wheezes  CV: regular rates and rhythm and no irregular beats  MS: extremities normal- no gross deformities noted and peripheral pulses normal  ORTHO: FEET: tenderness to the inferomedial aspect of the effected heel(s)at the insertion of the plantar fascia.  SKIN: no suspicious lesions or rashes  NEURO: Normal strength and tone, mentation intact and speech normal  PSYCH: mentation appears normal and affect normal/bright  MENTAL STATUS EXAM:  Appearance/Behavior: No apparent distress, Casually groomed and Dressed appropriately for weather  Speech: Normal  Mood/Affect: normal affect  Insight: Adequate    Results for orders placed or performed in visit on 04/09/21   XR Foot Right G/E 3 Views     Status: None    Narrative    FOOT RIGHT THREE OR MORE VIEWS   4/9/2021 11:24 AM     HISTORY: Right foot pain    COMPARISON: None.      Impression    IMPRESSION: Small spur or ossicle adjacent to the posterior margin of  the tibia at the tibiotalar joint appears corticated and likely a  chronic avulsion fracture. No definite acute fracture. Normal joint  spacing.    KAYLYN MEJIA MD       Assessment & Plan     Right foot pain  - XR Foot Right G/E 3 Views  - naproxen (NAPROSYN) 500 MG tablet  Dispense: 60 tablet; Refill: 1  - Orthopedic & Spine  Referral  - Orthopedic & Spine  Referral    Plantar fasciitis  Discussed pathophysiology of this condition and implications.  Questions answered. Superfeet insoles recommended. Good walking shoes or running shoes recommended for activities.  Minimize high-impact activities. Stretching twice daily. Ice after activity. Call for a posterior night splint if not improving in the next 2-3 weeks.  - Orthopedic & Spine   "Referral    Closed nondisplaced avulsion fracture of right talus, initial encounter  - Orthopedic & Spine  Referral    6}     BMI:   Estimated body mass index is 48.05 kg/m  as calculated from the following:    Height as of this encounter: 1.676 m (5' 6\").    Weight as of this encounter: 135 kg (297 lb 11.2 oz).       See Patient Instructions  Patient Instructions     PLAN:   1.   Symptomatic therapy suggested:   apply ice packs, referral to Orthopedics for this injury, prescription for analgesic given    2.  Orders Placed This Encounter   Medications     buPROPion (WELLBUTRIN SR) 100 MG 12 hr tablet     traZODone (DESYREL) 100 MG tablet     naproxen (NAPROSYN) 500 MG tablet     Sig: Take 1 tablet (500 mg) by mouth 2 times daily as needed for moderate pain     Dispense:  60 tablet     Refill:  1     Orders Placed This Encounter   Procedures     XR Foot Right G/E 3 Views     Orthopedic & Spine  Referral       3. Patient needs to follow up in if no improvement,or sooner if worsening of symptoms or other symptoms develop.  FURTHER TESTING:       - xray foot   Will follow up and/or notify patient of  results via My Chart to determine further need for followup  CONSULTATION/REFERRAL to podiatry   Will try taping foot   Please call [phone] to make appointment  if you do not hear from referrals in the next few days.     No follow-ups on file.    MATT Og [facility]        "

## 2021-04-09 NOTE — PATIENT INSTRUCTIONS
PLAN:   1.   Symptomatic therapy suggested:   apply ice packs, referral to Orthopedics for this injury, prescription for analgesic given    2.  Orders Placed This Encounter   Medications     buPROPion (WELLBUTRIN SR) 100 MG 12 hr tablet     traZODone (DESYREL) 100 MG tablet     naproxen (NAPROSYN) 500 MG tablet     Sig: Take 1 tablet (500 mg) by mouth 2 times daily as needed for moderate pain     Dispense:  60 tablet     Refill:  1     Orders Placed This Encounter   Procedures     XR Foot Right G/E 3 Views     Orthopedic & Spine  Referral       3. Patient needs to follow up in if no improvement,or sooner if worsening of symptoms or other symptoms develop.  FURTHER TESTING:       - xray foot   Will follow up and/or notify patient of  results via My Chart to determine further need for followup  CONSULTATION/REFERRAL to podiatry   Will try taping foot   Please call [phone] to make appointment  if you do not hear from referrals in the next few days.

## 2021-04-12 NOTE — RESULT ENCOUNTER NOTE
Pranay So,    Attached are your test results.  Your xray shows a spur and also possible chronic fracture as well   Will make referral to orthopedics as not sure how long this change has been there and also a lot of your pain can not be explained by this       Please call [phone] to make appointment  if you do not hear from referrals in the next few days.      Please contact us if you have any questions.    Selina Lancaster, CNP

## 2021-04-14 ENCOUNTER — OFFICE VISIT (OUTPATIENT)
Dept: PODIATRY | Facility: CLINIC | Age: 36
End: 2021-04-14
Attending: NURSE PRACTITIONER
Payer: COMMERCIAL

## 2021-04-14 VITALS
HEART RATE: 77 BPM | DIASTOLIC BLOOD PRESSURE: 81 MMHG | SYSTOLIC BLOOD PRESSURE: 133 MMHG | BODY MASS INDEX: 47.09 KG/M2 | WEIGHT: 293 LBS | HEIGHT: 66 IN

## 2021-04-14 DIAGNOSIS — M72.2 PLANTAR FASCIITIS, RIGHT: Primary | ICD-10-CM

## 2021-04-14 DIAGNOSIS — M79.671 RIGHT FOOT PAIN: ICD-10-CM

## 2021-04-14 PROCEDURE — 99203 OFFICE O/P NEW LOW 30 MIN: CPT | Performed by: PODIATRIST

## 2021-04-14 RX ORDER — METHYLPREDNISOLONE 4 MG
4 TABLET, DOSE PACK ORAL SEE ADMIN INSTRUCTIONS
Qty: 21 TABLET | Refills: 0 | Status: SHIPPED | OUTPATIENT
Start: 2021-04-14 | End: 2021-08-18

## 2021-04-14 ASSESSMENT — PAIN SCALES - GENERAL: PAINLEVEL: SEVERE PAIN (6)

## 2021-04-14 ASSESSMENT — MIFFLIN-ST. JEOR: SCORE: 2058.93

## 2021-04-14 NOTE — NURSING NOTE
"Chief Complaint   Patient presents with     Right Foot - Foot Pain       Initial /81   Pulse 77   Ht 1.676 m (5' 6\")   Wt 134.7 kg (297 lb)   BMI 47.94 kg/m   Estimated body mass index is 47.94 kg/m  as calculated from the following:    Height as of this encounter: 1.676 m (5' 6\").    Weight as of this encounter: 134.7 kg (297 lb).  Medications and allergies reviewed.      Radha OSWALD MA    "

## 2021-04-14 NOTE — PATIENT INSTRUCTIONS
Next Steps:      1. Support:  a. Wear supportive shoes, sandals, boots and/or inserts that have a rigid supportive sole.    i. This will offload the majority of tension forces that travel through your feet every step you take.    1. Skmaksimers Max Cushioning Elite/Premier   b. It is important that you also wear supportive shoe wear in the house to continue providing support to your feet.    c. You may always use a cushioned liner for your shoes if that makes your feet feel better.  2. Stretching  a. Calf stretching is essential to offload the tension forces that travel through your feet every step you take  b. Preferred calf stretch is the Runner's Stretch  i. Place one foot behind the other foot, flat against the ground (it is important to keep the heel on the ground).  The back leg is the one that will be stretched.  1. Start with the knee straight and lean your hips into the wall, counter or whatever you are leaning into - count to ten.  2. Next, bend the knee.  You should feel the stretch lower in the calf muscle - count to ten.  c. Repeat this stretch once an hour to start off with.  When symptoms subside, I recommend performing the stretch 3 times daily to prevent any future problems.                3. Tissue Massage  a. It is important that you physically loosen the inflammation tissue to help your body heal the injured tissue.  b. I recommend soaking your foot in warm water to increase the microcirculation to the soft tissues.  You may add Epson salt to the water if you prefer.  c. You may apply an over-the-counter muscle rub, such as Voltaren gel, and deeply massage the injured tissue.  4. Reduce Inflammation  a. You can ice the injured tissue with an ice pack with a light cloth covering or soaking in ice water 20 minutes to reduce any acute inflammation, typically at the end of the day.  b. If tolerated, you may take a Non-Steroidal Antiinflammatory medication (NSAID), such as Advil or Aleve, to help reduce  the inflammation tissue.  This can help the overall healing of the injured tissue.  i. It is important to take food with any NSAID medication as the most common side effect is stomach irritation.  If you encounter any problems when taking NSAID, it is recommended that you stop taking the medication and notify your provider.    It is important to understand that most problems that develop in the foot and ankle are caused by excessive tension that cause microinjury to the soft tissues and inflammation in the foot and ankle.  By addressing the underlying causes with support and stretching as well as treating the current inflammatory conditions with tissue massage and anti-inflammatory treatments, most foot and ankle musculoskeletal conditions will resolve.  This may take time to heal.  However, if symptoms persist past 4 weeks you should return to the office for reevaluation to determine further treatment options.

## 2021-04-14 NOTE — LETTER
4/14/2021         RE: Malika So  [address] [place] [id number]        Dear Colleague,    Thank you for referring your patient, Malika So, to the [facility] [place]. Please see a copy of my visit note below.    PATIENT HISTORY:  Malika So is a 35 year old female who presents to clinic  with a chief complaint of right foot pain.  The patient was seen in clinic before on 04/09 and had Xray taken.  The patient relates the pain is primarily located around the right heel into the ankle.  The patient relates that the problem has been going on for 2 weeks and is getting worse.  The patient relates trying rolling foot on frozen water bottle, ball with spike, wrapping and stretching foot with little relief.  The patient is currently employed as a nurse.  The patient was referred by Selina Lancaster CNP for consultation on the right foot.  Any previous notes and studies that pertain to the patient's condition were reviewed.    Pertinent medical, surgical and family history was reviewed in Epic chart and include gastroesophageal reflux, obesity     Medications:   Current Outpatient Medications:      buPROPion (WELLBUTRIN SR) 100 MG 12 hr tablet, , Disp: , Rfl:      clonazePAM (KLONOPIN) 1 MG tablet, Take 0.5-1 tablets (0.5-1 mg) by mouth 2 times daily as needed for anxiety - use sparingly, Disp: 20 tablet, Rfl: 0     levonorgestrel (MIRENA) 20 MCG/24HR IUD, 1 each by Intrauterine route once, Disp: , Rfl:      naproxen (NAPROSYN) 500 MG tablet, Take 1 tablet (500 mg) by mouth 2 times daily as needed for moderate pain, Disp: 60 tablet, Rfl: 1     sertraline 100 MG PO tablet, Take 2 tablets (200 mg) by mouth daily, Disp: 60 tablet, Rfl: 1     traZODone (DESYREL) 100 MG tablet, , Disp: , Rfl:      Allergies:    Allergies   Allergen Reactions     Adhesive Tape      blisters     Chlorhexidine      Pruritic rash     Fenugreek [Trigonella Foenum-Graecum] Hives      Ragweeds Hives       Vitals: There were no vitals taken for this visit.  BMI= There is no height or weight on file to calculate BMI.    LOWER EXTREMITY PHYSICAL EXAM    Dermatologic: Skin is intact to right lower extremities without significant lesions, rash or abrasion.        Vascular: DP & PT pulses are intact & regular on the right.   CFT and skin temperature is normal to the right lower extremities.     Neurologic: Lower extremity sensation is intact to light touch.  No evidence of weakness in the right lower extremities.        Musculoskeletal: Patient is ambulatory without assistive device or brace.  No gross ankle deformity noted.  No foot or ankle joint effusion is noted.  Noted pain on palpation plantar medial aspect of the right heel at the instep and insertion point of the plantar fascia.  No surrounding erythema noted.  Noted tight gastroc complex on the right lower extremity.    Diagnostics:  Radiographs were evaluated including weightbearing AP, lateral and medial oblique views of the right foot reveals a small avulsion fracture off the posterior aspect of the distal tibia.  No cortical erosions or periosteal elevation.  All joint margins appear stable.  There is no apparent fracture or tumor formation noted.  There is no evidence of foreign body.  The images were reviewed with the patient explaining the findings.      ASSESSMENT / PLAN:     ICD-10-CM    1. Right foot pain  M79.671 Orthopedic & Spine  Referral     Orthopedic & Spine  Referral   2. Plantar fasciitis  M72.2 Orthopedic & Spine  Referral   3. Closed nondisplaced avulsion fracture of right talus, initial encounter  S92.154A Orthopedic & Spine  Referral       I have explained to Malika about the conditions.  We discussed the underlying contributing factors of the condition as well as both conservative and surgical treatment options along with expected length of recovery.  At this time, the patient was  educated on the importance of offloading supportive shoes and other devices.  I demonstrated to the patient calf stretches to perform every hour daily until symptoms resolve.  After symptoms resolve, the patient was advised to perform the stretches 3 times daily to prevent future recurrence.  The patient was instructed to perform warm soaks with Epson salt after which to also apply over-the-counter Voltaren gel to deeply massage the injured tissue.  The patient was instructed to do this on a daily basis until symptoms resolve.  The patient may also take over-the-counter NSAID medication, if tolerated, to help further reduce the inflammation tissue.   The patient was advised to take this type of medication with food to prevent stomach irritation and to stop taking the medication if stomach irritation occurs.  The patient was fitted with a Dynaflex insert that will aid in offloading the tension forces to the soft tissues and prevent further inflammation.  To reduce the amount of current inflammation, the patient was prescribed a Medrol Dosepak to be taken in a tapered dose fashion.  The patient will return in four weeks for reevaluation if the symptoms do not resolve.      Malika verbalized agreement with and understanding of the rational for the diagnosis and treatment plan.  All questions were answered to best of my ability and the patient's satisfaction. The patient was advised to contact the clinic with any questions that may arise after the clinic visit.      Disclaimer: This note consists of symbols derived from keyboarding, dictation and/or voice recognition software. As a result, there may be errors in the script that have gone undetected. Please consider this when interpreting information found in this chart.       DAREK Hartley.P.M., F.A.C.F.A.S.        Again, thank you for allowing me to participate in the care of your patient.        Sincerely,        Franko Jacobo DPM

## 2021-04-14 NOTE — PROGRESS NOTES
PATIENT HISTORY:  Malika So is a 35 year old female who presents to clinic  with a chief complaint of right foot pain.  The patient was seen in clinic before on 04/09 and had Xray taken.  The patient relates the pain is primarily located around the right heel into the ankle.  The patient relates that the problem has been going on for 2 weeks and is getting worse.  The patient relates trying rolling foot on frozen water bottle, ball with spike, wrapping and stretching foot with little relief.  The patient is currently employed as a nurse.  The patient was referred by Selina Lancaster CNP for consultation on the right foot.  Any previous notes and studies that pertain to the patient's condition were reviewed.    Pertinent medical, surgical and family history was reviewed in Epic chart and include gastroesophageal reflux, obesity     Medications:   Current Outpatient Medications:      buPROPion (WELLBUTRIN SR) 100 MG 12 hr tablet, , Disp: , Rfl:      clonazePAM (KLONOPIN) 1 MG tablet, Take 0.5-1 tablets (0.5-1 mg) by mouth 2 times daily as needed for anxiety - use sparingly, Disp: 20 tablet, Rfl: 0     levonorgestrel (MIRENA) 20 MCG/24HR IUD, 1 each by Intrauterine route once, Disp: , Rfl:      naproxen (NAPROSYN) 500 MG tablet, Take 1 tablet (500 mg) by mouth 2 times daily as needed for moderate pain, Disp: 60 tablet, Rfl: 1     sertraline 100 MG PO tablet, Take 2 tablets (200 mg) by mouth daily, Disp: 60 tablet, Rfl: 1     traZODone (DESYREL) 100 MG tablet, , Disp: , Rfl:      Allergies:    Allergies   Allergen Reactions     Adhesive Tape      blisters     Chlorhexidine      Pruritic rash     Fenugreek [Trigonella Foenum-Graecum] Hives     Ragweeds Hives       Vitals: There were no vitals taken for this visit.  BMI= There is no height or weight on file to calculate BMI.    LOWER EXTREMITY PHYSICAL EXAM    Dermatologic: Skin is intact to right lower extremities without significant lesions, rash or abrasion.         Vascular: DP & PT pulses are intact & regular on the right.   CFT and skin temperature is normal to the right lower extremities.     Neurologic: Lower extremity sensation is intact to light touch.  No evidence of weakness in the right lower extremities.        Musculoskeletal: Patient is ambulatory without assistive device or brace.  No gross ankle deformity noted.  No foot or ankle joint effusion is noted.  Noted pain on palpation plantar medial aspect of the right heel at the instep and insertion point of the plantar fascia.  No surrounding erythema noted.  Noted tight gastroc complex on the right lower extremity.    Diagnostics:  Radiographs were evaluated including weightbearing AP, lateral and medial oblique views of the right foot reveals a small avulsion fracture off the posterior aspect of the distal tibia.  No cortical erosions or periosteal elevation.  All joint margins appear stable.  There is no apparent fracture or tumor formation noted.  There is no evidence of foreign body.  The images were reviewed with the patient explaining the findings.      ASSESSMENT / PLAN:     ICD-10-CM    1. Right foot pain  M79.671 Orthopedic & Spine  Referral     Orthopedic & Spine  Referral   2. Plantar fasciitis  M72.2 Orthopedic & Spine  Referral   3. Closed nondisplaced avulsion fracture of right talus, initial encounter  S92.154A Orthopedic & Spine  Referral       I have explained to Malika about the conditions.  We discussed the underlying contributing factors of the condition as well as both conservative and surgical treatment options along with expected length of recovery.  At this time, the patient was educated on the importance of offloading supportive shoes and other devices.  I demonstrated to the patient calf stretches to perform every hour daily until symptoms resolve.  After symptoms resolve, the patient was advised to perform the stretches 3 times daily to prevent  future recurrence.  The patient was instructed to perform warm soaks with Epson salt after which to also apply over-the-counter Voltaren gel to deeply massage the injured tissue.  The patient was instructed to do this on a daily basis until symptoms resolve.  The patient may also take over-the-counter NSAID medication, if tolerated, to help further reduce the inflammation tissue.   The patient was advised to take this type of medication with food to prevent stomach irritation and to stop taking the medication if stomach irritation occurs.  The patient was fitted with a Dynaflex insert that will aid in offloading the tension forces to the soft tissues and prevent further inflammation.  To reduce the amount of current inflammation, the patient was prescribed a Medrol Dosepak to be taken in a tapered dose fashion.  The patient will return in four weeks for reevaluation if the symptoms do not resolve.      Malika verbalized agreement with and understanding of the rational for the diagnosis and treatment plan.  All questions were answered to best of my ability and the patient's satisfaction. The patient was advised to contact the clinic with any questions that may arise after the clinic visit.      Disclaimer: This note consists of symbols derived from keyboarding, dictation and/or voice recognition software. As a result, there may be errors in the script that have gone undetected. Please consider this when interpreting information found in this chart.       VINOD Jacobo D.P.M., F.A.C.F.A.S.

## 2021-05-12 NOTE — LETTER
2018       RE: Malika So  [address] [place] [address]     Dear Colleague,    Thank you for referring your patient, Malika So, to the [facility] SURGICAL WEIGHT MANAGEMENT at [facility]. Please see a copy of my visit note below.        Return Bariatric Surgery Note    RE: Malika So  MR#: [id number]  : 1985  VISIT DATE: 2018    Dear Josefina Welch,    I had the pleasure of seeing your patient, Malika So, in my post-bariatric surgery assessment clinic.    CHIEF COMPLAINT: Post-bariatric surgery follow-up. Lap band follow up.     HISTORY OF PRESENT ILLNESS:  Questions Regarding Prior Weight Loss Surgery Reviewed With Patient 2018   I had the following weight loss procedure: Laparoscopic Adjustable Gastric Band   What year was your surgery? 2009   How has your weight changed since your last visit? I have lost weight   Are you currently taking any weight loss medications? Yes   Do you currently have any of the following: None of the above   Have you been to the Emergency room since your last visit with us? No   Were you in the hospital since your last visit with us? No   Do you have any concerns today? no     Last visit I removed all fluid from her band due to symptoms of tight band and tight band seen on UGI. (5cc removed)  She had repeat UGI 18  She also started phentermine and topiramate 3/23/18 with me.   She has lost 12 lbs since last visit  She is nervous about adding fluid back into her band.     Weight History:     2018   What is your highest lifetime weight? 298   What is your lowest weight since surgery? (In pounds) 130     Initial Weight: 246 lb 1.6 oz  Current Weight: Weight: 234 lb 3.2 oz  Cumulative weight loss (lbs): 11.9  Last Visits Weight: 246 lb 1.6 oz    Questions Regarding Co-Morbidities and Health Concerns Reviewed With Patient 2018   Pre-diabetes: Never   Diabetes  II: Never   High Blood Pressure: Stayed the same   High cholesterol: Never   Heartburn/Reflux: Gone away   Are you taking daily medication for heartburn, acid reflux, or GERD (acid reflux disease)? -   Sleep apnea: Never   PCOS: Never   Back pain: Improved   Joint pain: Improved   Lower leg swelling: Improved       Eating Habits 4/26/2018   How many meals do you eat per day? 3   Do you snack between meals? Sometimes   How much food are you eating at each meal? 1/2 cup to 1 cup   Are you able to separate your meals and liquids by at least 30 minutes? Yes   Are you able to avoid liquid calories? Yes       Exercise Questions Reviewed With Patient 4/26/2018   How often do you exercise? Never   What keeps you from being more active?  Pain, Lack of Time, Too tired       Social History:      4/26/2018   Are you smoking? No   Are you drinking alcohol? No       Medications:  Current Outpatient Prescriptions   Medication     levonorgestrel (MIRENA) 20 MCG/24HR IUD     phentermine 15 MG capsule     ranitidine (ZANTAC) 150 MG tablet     sertraline (ZOLOFT) 100 MG tablet     topiramate (TOPAMAX) 25 MG tablet     No current facility-administered medications for this visit.          4/26/2018   Do you avoid NSAIDs such as (Ibuprofen, Aleve, Naproxen, Advil)?   Yes       ROS:  GI:      4/26/2018   Vomiting: No   Diarrhea: Yes   Constipation: Yes   Swallowing trouble: No   Abdominal pain: Yes   Heartburn: No   Rash in skin folds: Yes   Depression: Yes   Stress urinary incontinence Yes     Skin:   RADHA GREWAL ROS - SKIN 4/26/2018   Rash in skin folds: Yes     Psych:      4/26/2018   Depression: Yes   Anxiety: Yes     Female Only:   BAR RBS ROS - FEMALE ONLY 4/26/2018   Female only: Irregular menstrual cycles, Birth control       LABS/IMAGING/MEDICAL RECORDS REVIEW:     UGI 4/17/18    Impression: Compared with 3/19/2018 there is improved passage through  the gastric band. Upstream distal esophageal dilatation has resolved.     PHYSICAL  "EXAMINATION:  /77  Pulse 77  Temp 98.4  F (36.9  C) (Oral)  Ht 5' 10\"  Wt 234 lb 3.2 oz  SpO2 97%  BMI 33.6 kg/m2   General: No apparent distress  Neuro: A & O x 3  Head: Atraumatic, normocephalic  Eyes: PERRL, EOMI  Skin: warm and dry, no rashes on exposed skin  Respiratory: respirations unlabored  Abdomen: soft NT ND  Extremities: No LE swelling      ASSESSMENT AND PLAN:      1. 9 years status laparoscopic adjustable gastric band here for [facility] follow up. She has no fluid in her band.  UGI repeat looks good.  Doing well and has lost 12 more lbs on topiramate and phentermine.  She would like a small amount of fluid added to her band.  2. Morbid Obesity current BMI: Body mass index is 33.6 kg/(m^2).  3. Post surgical malabsorption:   Labs ordered per protocol.   Follow food plan per dietitian recommendations.   Continue taking recommended post-op vitamins.  4. Return to clinic in July with Rubi Morris   5. Continue phentermine and topiramate  6. Stop phentermine 2 days before tubal ligation surgery  7. Add 2 cc today to band.      Procedure details: Patient was positioned in the supine position on the procedure table and port was palpated under the skin. Skin was prepped with an alcohol pad and a 20 gauge non coring del toro needle was used to access port.  2cc was added to band and needle was removed. Patient was able to drink a glass of water after the adjustment without difficulties.      I spent a total of 15 minutes face to face with Malika during today's office visit. Over 50% of this time was spent counseling the patient and/or coordinating care.    Again, thank you for allowing me to participate in the care of your patient.      Sincerely,    Rubi Morris PA-C      " 49 y/o F L hand dominant with PMHx of breast CA s/p b/l mastectomy with implants, gastric polyp, anxiety, s/p cholecystectomy, and s/p ANIVAL-BSO presents ambulatory to the ED sent from  for plastics consult regarding +laceration to R hand incurred when using electric  around 4PM today. Reports associated +pain to hand. No fever. Had XR and received Tetanus at urgent care PTA. Former smoker. NKDA. PCP: Dr. Percy Coyle.

## 2021-07-15 ENCOUNTER — MYC MEDICAL ADVICE (OUTPATIENT)
Dept: FAMILY MEDICINE | Facility: CLINIC | Age: 36
End: 2021-07-15

## 2021-07-24 DIAGNOSIS — F41.9 ANXIETY: ICD-10-CM

## 2021-07-24 RX ORDER — SERTRALINE HYDROCHLORIDE 100 MG/1
200 TABLET, FILM COATED ORAL DAILY
Qty: 60 TABLET | Refills: 1 | Status: SHIPPED | OUTPATIENT
Start: 2021-07-24 | End: 2022-09-23

## 2021-07-25 NOTE — TELEPHONE ENCOUNTER
PHQ-9 SCORE 10/6/2020 10/6/2020 4/9/2021   PHQ-9 Total Score - - -   PHQ-9 Total Score MyChart - - -   PHQ-9 Total Score 9 9 8       COMPA-7 SCORE 6/6/2017 4/24/2019 10/6/2020   Total Score - - -   Total Score 12 15 7       [facility] Follow-up to PHQ 10/6/2020 10/6/2020 4/9/2021   PHQ-9 9. Suicide Ideation past 2 weeks Not at all Not at all Not at all   Refill completed.     Please update PHQ 9 and COMPA  Thanks

## 2021-07-26 ENCOUNTER — OFFICE VISIT (OUTPATIENT)
Dept: FAMILY MEDICINE | Facility: CLINIC | Age: 36
End: 2021-07-26
Payer: COMMERCIAL

## 2021-07-26 ENCOUNTER — ANCILLARY PROCEDURE (OUTPATIENT)
Dept: GENERAL RADIOLOGY | Facility: CLINIC | Age: 36
End: 2021-07-26
Attending: NURSE PRACTITIONER
Payer: COMMERCIAL

## 2021-07-26 VITALS
OXYGEN SATURATION: 98 % | HEART RATE: 90 BPM | RESPIRATION RATE: 16 BRPM | SYSTOLIC BLOOD PRESSURE: 125 MMHG | WEIGHT: 289.5 LBS | DIASTOLIC BLOOD PRESSURE: 84 MMHG | TEMPERATURE: 98.3 F | BODY MASS INDEX: 46.52 KG/M2 | HEIGHT: 66 IN

## 2021-07-26 DIAGNOSIS — J34.0 CELLULITIS OF SIDEWALL OF NOSE: ICD-10-CM

## 2021-07-26 DIAGNOSIS — M72.2 PLANTAR FASCIITIS: ICD-10-CM

## 2021-07-26 DIAGNOSIS — M25.562 ACUTE PAIN OF LEFT KNEE: Primary | ICD-10-CM

## 2021-07-26 DIAGNOSIS — M25.562 LEFT KNEE PAIN, UNSPECIFIED CHRONICITY: ICD-10-CM

## 2021-07-26 PROCEDURE — 99214 OFFICE O/P EST MOD 30 MIN: CPT | Performed by: NURSE PRACTITIONER

## 2021-07-26 PROCEDURE — 73562 X-RAY EXAM OF KNEE 3: CPT | Mod: LT | Performed by: FAMILY MEDICINE

## 2021-07-26 RX ORDER — DOXYCYCLINE HYCLATE 100 MG
100 TABLET ORAL 2 TIMES DAILY
Qty: 20 TABLET | Refills: 0 | Status: SHIPPED | OUTPATIENT
Start: 2021-07-26 | End: 2021-08-05

## 2021-07-26 ASSESSMENT — MIFFLIN-ST. JEOR: SCORE: 2024.91

## 2021-07-26 NOTE — PROGRESS NOTES
Subjective   Malika is a 35 year old who presents for the following health issues     HPI     Sore  Onset/Duration: Sore left side of nose  Description  Location: Inside left nostril   Color: None  Character: raised, painful  Itching: moderate  Bleeding:  no  Intensity:  moderate  Progression of Symptoms:  same  Accompanying signs and symptoms:   Bleeding: no  Scaling: no  Excessive sun exposure/tanning: no  Sunscreen used: no    Precipitating or alleviating factors: None  Therapies tried and outcome: Saline solution  Will not go away   Painful even on outside on left side of her nostril   At start of allergy season will get blocked up her sinuses for the last couple months     Concern - Left Knee pain  Onset: about 2 months ago  Description: Just started at work  Intensity: moderate  Progression of Symptoms:  worsening and constant  Accompanying Signs & Symptoms: None  Previous history of similar problem: no  Precipitating factors:        Worsened by: Any movement  Alleviating factors:        Improved by: Nothing  Therapies tried and outcome: Have been taking tramadol, icing,   Started about 2 months ago   No fall   Not sure how may have hurt it   Occasionally when younger her knee would pop and crack   Did feel it pull once at work   Now pain is all around and behind her left knee       Labs reviewed in EPIC  BP Readings from Last 3 Encounters:   21 125/84   21 133/81   21 126/85    Wt Readings from Last 3 Encounters:   21 131.3 kg (289 lb 8 oz)   21 134.7 kg (297 lb)   21 135 kg (297 lb 11.2 oz)                  Patient Active Problem List   Diagnosis     Mild major depression (H)     Anxiety     Non-specific low back pain     Morbid obesity (H)     Bariatric surgery status     GERD without esophagitis     Past Surgical History:   Procedure Laterality Date     C EACH ADD TOOTH EXTRACTION      multiple tooth extractions      SECTION N/A 2016    Procedure:   SECTION;  Surgeon: Kelli Osullivan MD;  Location: [facility] L+D     CHOLECYSTECTOMY, LAPOROSCOPIC  2010     LAP ADJUSTABLE GASTRIC BAND  2009    [facility] lost over 150 lb      LAPAROSCOPIC SALPINGECTOMY Bilateral 5/9/2018    Procedure: LAPAROSCOPIC SALPINGECTOMY;  Bilateral Laparoscopic Salpingectomy ;  Surgeon: Celeste Mai MD;  Location: [facility] OR     LASIK  2012     TONSILLECTOMY  1995       Social History     Tobacco Use     Smoking status: Never Smoker     Smokeless tobacco: Never Used   Substance Use Topics     Alcohol use: No     Alcohol/week: 0.8 standard drinks     Family History   Problem Relation Age of Onset     C.A.D. Maternal Grandfather      Diabetes Maternal Grandfather      Hypertension Maternal Grandfather      Obesity Maternal Grandfather      Hyperlipidemia Maternal Grandfather      Coronary Artery Disease Maternal Grandfather      Colon Polyps Maternal Grandfather      Heart Failure Paternal Grandmother         CHF     Anemia Paternal Grandmother      Glaucoma Father         corneal transplant. congenital glaucoma     Obesity Maternal Grandmother      Other Cancer Maternal Grandmother         cirrosis of liver. non-drinker     Obesity Other         Had GI surgery     Hypertension Other         mother's side of family     Arthritis Other         both sides of family     Hypertension Other      Obesity Other          Current Outpatient Medications   Medication Sig Dispense Refill     buPROPion (WELLBUTRIN SR) 100 MG 12 hr tablet        clonazePAM (KLONOPIN) 1 MG tablet Take 0.5-1 tablets (0.5-1 mg) by mouth 2 times daily as needed for anxiety - use sparingly 20 tablet 0     levonorgestrel (MIRENA) 20 MCG/24HR IUD 1 each by Intrauterine route once       naproxen (NAPROSYN) 500 MG tablet Take 1 tablet (500 mg) by mouth 2 times daily as needed for moderate pain 60 tablet 1     sertraline (ZOLOFT) 100 MG tablet Take 2 tablets (200 mg) by mouth daily 60 tablet 1     traZODone (DESYREL) 100 MG tablet     "    methylPREDNISolone (MEDROL DOSEPAK) 4 MG tablet therapy pack Take 1 tablet (4 mg) by mouth See Admin Instructions Tapered dose (Patient not taking: Reported on 7/26/2021) 21 tablet 0     Allergies   Allergen Reactions     Adhesive Tape      blisters     Chlorhexidine      Pruritic rash     Fenugreek [Trigonella Foenum-Graecum] Hives     Ragweeds Hives     Colophony  [Pinus Strobus] Blisters, Dermatitis, Hives, Itching and Rash             Review of Systems   CONSTITUTIONAL:NEGATIVE for fever, chills, change in weight  ENT/MOUTH: POSITIVE for nasal congestion, postnasal drainage and left nasal tenderness  and NEGATIVE for epistaxis and fever  RESP:NEGATIVE for significant cough or SOB  CV: NEGATIVE for chest pain/chest pressure  GI: NEGATIVE for nausea, poor appetite and vomiting  MUSCULOSKELETAL: POSITIVE  for joint pain left knee  and joint stiffness  and NEGATIVE for joint swelling  and joint warmth   NEURO: NEGATIVE for weakness, dizziness or paresthesias  ENDOCRINE: NEGATIVE for temperature intolerance, skin/hair changes  HEME/ALLERGY/IMMUNE: NEGATIVE for bleeding problems      Objective    /84 (BP Location: Right arm, Patient Position: Sitting, Cuff Size: Adult Large)   Pulse 90   Temp 98.3  F (36.8  C) (Oral)   Resp 16   Ht 1.676 m (5' 6\")   Wt 131.3 kg (289 lb 8 oz)   SpO2 98%   Breastfeeding No   BMI 46.73 kg/m    Body mass index is 46.73 kg/m .  Physical Exam   GENERAL APPEARANCE: alert, no distress and Nourishment morbidly obese   HENT: ear canals and TM's normal, oral mucous membranes moist, normal cephalic/atraumatic and nares tender on the left with mild swelling. Nasal passages appear inflamed   NECK: no adenopathy  RESP: lungs clear to auscultation - no rales, rhonchi or wheezes  CV: regular rates and rhythm and no murmur, click or rub  MS: extremities normal- no gross deformities noted and peripheral pulses normal  Inspection: AP/lateral alignment normal, No effusion  Tender: no " "point tenderness   Active Range of Motion: full flexion, pain with flexion, full extension, pain with extension  Strength: quad  5-/5  FEET: tenderness to the inferomedial aspect of the effected heel(s)at the insertion of the plantar fascia.  SKIN: negatives: color normal, vascularity normal, temperature normal  NEURO: Normal strength and tone, mentation intact and speech normal  PSYCH: mentation appears normal and affect normal/bright  MENTAL STATUS EXAM:  Appearance/Behavior: No apparent distress, Poorly groomed and Dressed appropriately for weather  Speech: Normal  Mood/Affect: normal affect  Insight: Adequate    Results for orders placed or performed in visit on 07/26/21   XR Knee Left 3 Views     Status: None    Narrative    XR KNEE LEFT 3 VIEWS 7/26/2021 11:35 AM     HISTORY: Left knee pain, unspecified chronicity      Impression    IMPRESSION: The lateral radiograph is indeterminate for joint  effusion. Otherwise unremarkable knee radiographs.    KOREY ECHEVARRIA MD         SYSTEM ID:  [id number]     Assessment & Plan     Acute pain of left knee  Symptomatic therapy suggested: call prn if symptoms persist or worsen.  prescription for NSAID given, referral to Physical Therapy  - XR Knee Left 3 Views  - diclofenac (VOLTAREN) 1 % topical gel  Dispense: 100 g; Refill: 3  - [facility]    Cellulitis of sidewall of nose   take antibiotics as directed.  If new, worsening or persistent symptoms, the patient is to call or return for a recheck.  - doxycycline hyclate (VIBRA-TABS) 100 MG tablet  Dispense: 20 tablet; Refill: 0    Plantar fasciitis  FOLLOW UP WITH SPECIALIST :podiatry   - [facility] [employer] DME    [id number]     BMI:   Estimated body mass index is 46.73 kg/m  as calculated from the following:    Height as of this encounter: 1.676 m (5' 6\").    Weight as of this encounter: 131.3 kg (289 lb 8 oz).       See Patient Instructions  Patient Instructions     PLAN:   1.   Symptomatic therapy " suggested: OTC saline nasal spray as needed and call prn if symptoms persist or worsen.  2.  Orders Placed This Encounter   Medications     diclofenac (VOLTAREN) 1 % topical gel     Sig: Apply 2 g topically 4 times daily     Dispense:  100 g     Refill:  3     doxycycline hyclate (VIBRA-TABS) 100 MG tablet     Sig: Take 1 tablet (100 mg) by mouth 2 times daily for 10 days     Dispense:  20 tablet     Refill:  0     Orders Placed This Encounter   Procedures     XR Knee Left 3 Views       3. Patient needs to follow up in if no improvement,or sooner if worsening of symptoms or other symptoms develop.  Will follow up and/or notify patient of  results via My Chart to determine further need for followup          No follow-ups on file.    MATT Og [facility]

## 2021-07-26 NOTE — Clinical Note
Hi Dr Jacobo   Saw Malika today still having issues with right foot but was she supposed to get an insert from you as she does not have one.   Thanks   Selina Lancaster, NP, APRN CNP

## 2021-07-26 NOTE — PATIENT INSTRUCTIONS
PLAN:   1.   Symptomatic therapy suggested: OTC saline nasal spray as needed and call prn if symptoms persist or worsen.  2.  Orders Placed This Encounter   Medications     diclofenac (VOLTAREN) 1 % topical gel     Sig: Apply 2 g topically 4 times daily     Dispense:  100 g     Refill:  3     doxycycline hyclate (VIBRA-TABS) 100 MG tablet     Sig: Take 1 tablet (100 mg) by mouth 2 times daily for 10 days     Dispense:  20 tablet     Refill:  0     Orders Placed This Encounter   Procedures     XR Knee Left 3 Views       3. Patient needs to follow up in if no improvement,or sooner if worsening of symptoms or other symptoms develop.  Will follow up and/or notify patient of  results via My Chart to determine further need for followup

## 2021-07-27 NOTE — PROGRESS NOTES
Could you call the patient to find out if she needed a Dynaflex insert and didn't receive one?  Thank you.

## 2021-07-28 NOTE — RESULT ENCOUNTER NOTE
Pranay So,    Attached are your test results.  Left knee nothing specifically abnormal   Lets try some physical therapy   I will place the order for you   Physical Therapy, Hand Therapy and Chiropractic Care are available through:  *[facility]  *Hand Therapy (Occupational Therapy or Physical Therapy)  *[facility]    Call one number to schedule at any of the above locations: [phone].    Physical therapy, Hand therapy and/or Chiropractic care has been recommended by your physician as an excellent treatment option to reduce pain and help people return to normal activities, including sports.  Therapy and/or chiropractic care services are a great complement or alternative to expensive and invasive surgery, injections, or long-term use of prescription medications. The primary goal is to identify the underlying problem and provide you the tools to manage your condition on your own.     Please be aware that coverage of these services is subject to the terms and limitations of your health insurance plan.  Call member services at your health plan with any benefit or coverage questions.      Please bring the following to your appointment:  *Your personal calendar for scheduling future appointments  *Comfortable clothing     Please contact us if you have any questions.    Selina Lancaster, CNP

## 2021-08-18 ENCOUNTER — OFFICE VISIT (OUTPATIENT)
Dept: OBGYN | Facility: CLINIC | Age: 36
End: 2021-08-18
Payer: COMMERCIAL

## 2021-08-18 VITALS
BODY MASS INDEX: 46.16 KG/M2 | DIASTOLIC BLOOD PRESSURE: 82 MMHG | HEART RATE: 88 BPM | RESPIRATION RATE: 18 BRPM | TEMPERATURE: 98.3 F | WEIGHT: 287.2 LBS | SYSTOLIC BLOOD PRESSURE: 125 MMHG | HEIGHT: 66 IN

## 2021-08-18 DIAGNOSIS — Z30.432 ENCOUNTER FOR REMOVAL OF INTRAUTERINE CONTRACEPTIVE DEVICE: ICD-10-CM

## 2021-08-18 DIAGNOSIS — Z30.430 ENCOUNTER FOR INSERTION OF MIRENA IUD: ICD-10-CM

## 2021-08-18 DIAGNOSIS — Z12.4 CERVICAL CANCER SCREENING: Primary | ICD-10-CM

## 2021-08-18 DIAGNOSIS — Z30.433 ENCOUNTER FOR REMOVAL AND REINSERTION OF INTRAUTERINE CONTRACEPTIVE DEVICE: ICD-10-CM

## 2021-08-18 DIAGNOSIS — Z00.00 ROUTINE GENERAL MEDICAL EXAMINATION AT A HEALTH CARE FACILITY: ICD-10-CM

## 2021-08-18 LAB — HBA1C MFR BLD: 5.3 % (ref 0–5.6)

## 2021-08-18 PROCEDURE — 58301 REMOVE INTRAUTERINE DEVICE: CPT | Performed by: OBSTETRICS & GYNECOLOGY

## 2021-08-18 PROCEDURE — G0145 SCR C/V CYTO,THINLAYER,RESCR: HCPCS | Performed by: OBSTETRICS & GYNECOLOGY

## 2021-08-18 PROCEDURE — 87624 HPV HI-RISK TYP POOLED RSLT: CPT | Performed by: OBSTETRICS & GYNECOLOGY

## 2021-08-18 PROCEDURE — 36415 COLL VENOUS BLD VENIPUNCTURE: CPT | Performed by: OBSTETRICS & GYNECOLOGY

## 2021-08-18 PROCEDURE — 83036 HEMOGLOBIN GLYCOSYLATED A1C: CPT | Performed by: OBSTETRICS & GYNECOLOGY

## 2021-08-18 PROCEDURE — 58300 INSERT INTRAUTERINE DEVICE: CPT | Performed by: OBSTETRICS & GYNECOLOGY

## 2021-08-18 PROCEDURE — 99385 PREV VISIT NEW AGE 18-39: CPT | Mod: 25 | Performed by: OBSTETRICS & GYNECOLOGY

## 2021-08-18 ASSESSMENT — MIFFLIN-ST. JEOR: SCORE: 2014.48

## 2021-08-18 ASSESSMENT — ANXIETY QUESTIONNAIRES
3. WORRYING TOO MUCH ABOUT DIFFERENT THINGS: SEVERAL DAYS
5. BEING SO RESTLESS THAT IT IS HARD TO SIT STILL: NOT AT ALL
2. NOT BEING ABLE TO STOP OR CONTROL WORRYING: SEVERAL DAYS
IF YOU CHECKED OFF ANY PROBLEMS ON THIS QUESTIONNAIRE, HOW DIFFICULT HAVE THESE PROBLEMS MADE IT FOR YOU TO DO YOUR WORK, TAKE CARE OF THINGS AT HOME, OR GET ALONG WITH OTHER PEOPLE: SOMEWHAT DIFFICULT
7. FEELING AFRAID AS IF SOMETHING AWFUL MIGHT HAPPEN: SEVERAL DAYS
6. BECOMING EASILY ANNOYED OR IRRITABLE: SEVERAL DAYS
1. FEELING NERVOUS, ANXIOUS, OR ON EDGE: SEVERAL DAYS
GAD7 TOTAL SCORE: 7

## 2021-08-18 ASSESSMENT — PATIENT HEALTH QUESTIONNAIRE - PHQ9
SUM OF ALL RESPONSES TO PHQ QUESTIONS 1-9: 7
5. POOR APPETITE OR OVEREATING: MORE THAN HALF THE DAYS

## 2021-08-18 NOTE — NURSING NOTE
"Initial /82 (BP Location: Left arm, Patient Position: Chair, Cuff Size: Adult Large)   Pulse 88   Temp 98.3  F (36.8  C) (Tympanic)   Resp 18   Ht 1.676 m (5' 6\")   Wt 130.3 kg (287 lb 3.2 oz)   Breastfeeding No   BMI 46.36 kg/m   Estimated body mass index is 46.36 kg/m  as calculated from the following:    Height as of this encounter: 1.676 m (5' 6\").    Weight as of this encounter: 130.3 kg (287 lb 3.2 oz). .    Anabella Wise, JOSE DANIEL    "

## 2021-08-18 NOTE — PROGRESS NOTES
SUBJECTIVE:   CC: Malika So is an 35 year old woman who presents for preventive health visit.       Patient has been advised of split billing requirements and indicates understanding: No  Healthy Habits:    Do you get at least three servings of calcium containing foods daily (dairy, green leafy vegetables, etc.)? no, taking calcium and/or vitamin D supplement: no    Amount of exercise or daily activities, outside of work: 0 day(s) per week    Problems taking medications regularly No    Medication side effects: No    Have you had an eye exam in the past two years? no    Do you see a dentist twice per year? yes    Do you have sleep apnea, excessive snoring or daytime drowsiness?yes      -------------------------------------    Today's PHQ-2 Score:   PHQ-2 ( 1999 [employer]) 10/6/2020 10/5/2020   Q1: Little interest or pleasure in doing things 2 1   Q2: Feeling down, depressed or hopeless 2 1   PHQ-2 Score 4 2   Q1: Little interest or pleasure in doing things - Several days   Q2: Feeling down, depressed or hopeless - Several days   PHQ-2 Score - 2       Abuse: Current or Past(Physical, Sexual or Emotional)- No  Do you feel safe in your environment? Yes    Have you ever done Advance Care Planning? (For example, a Health Directive, POLST, or a discussion with a medical provider or your loved ones about your wishes): No, advance care planning information given to patient to review.  Patient plans to discuss their wishes with loved ones or provider.      Social History     Tobacco Use     Smoking status: Never Smoker     Smokeless tobacco: Never Used   Substance Use Topics     Alcohol use: No     Alcohol/week: 0.8 standard drinks     If you drink alcohol do you typically have >3 drinks per day or >7 drinks per week? No                     Reviewed orders with patient.  Reviewed health maintenance and updated orders accordingly - Yes  Lab work is in process    FHS-7: No flowsheet data found.  click delete button  to remove this line now  Patient under 40 years of age: Routine Mammogram Screening not recommended.   Pertinent mammograms are reviewed under the imaging tab.    Pertinent mammograms are reviewed under the imaging tab.  History of abnormal Pap smear: NO - age 30-65 PAP every 5 years with negative HPV co-testing recommended  PAP / HPV Latest Ref Rng & Units 2016 2012 11/10/2010   PAP (Historical) - NIL NIL NIL   HPV16 NEG Negative - -   HPV18 NEG Negative - -   HRHPV NEG Negative - -     Reviewed and updated as needed this visit by clinical staff  Tobacco  Allergies  Meds   Med Hx  Surg Hx  Fam Hx  Soc Hx        Reviewed and updated as needed this visit by Provider                Past Medical History:   Diagnosis Date     GERD without esophagitis      Hypertension     prior to weight loss     Latent tuberculosis     neg chest x-ray. treated with INH for 9 moniths     Mild major depression (H)     celexa 100 mg      Morbid obesity (H)      Urinary incontinence       Past Surgical History:   Procedure Laterality Date     C EACH ADD TOOTH EXTRACTION      multiple tooth extractions      SECTION N/A 2016    Procedure:  SECTION;  Surgeon: Kelli Osullivan MD;  [facility]     CHOLECYSTECTOMY, LAPOROSCOPIC       LAP ADJUSTABLE GASTRIC BAND      [facility] lost over 150 lb      LAPAROSCOPIC SALPINGECTOMY Bilateral 2018    Procedure: LAPAROSCOPIC SALPINGECTOMY;  Bilateral Laparoscopic Salpingectomy ;  Surgeon: Celeste Mai MD;  Location:  OR     [facility]       TONSILLECTOMY       OB History    Para Term  AB Living   2 2 1 1 0 2   SAB TAB Ectopic Multiple Live Births   0 0 0 0 2      # Outcome Date GA Lbr Zafar/2nd Weight Sex Delivery Anes PTL Lv   2 Term 16 39w0d  3.09 kg (6 lb 13 oz) F CS-LTranv Spinal N TIM      Birth Comments: none      Apgar1: 9  Apgar5: 9   1  13 36w1d 08:30 / 01:13 2.977 kg (6 lb 9 oz) M Vag-Spont EPI N  "TIM      Name: JAILYN DUARTE      Apgar1: 9  Apgar5: 9       ROS:  CONSTITUTIONAL: NEGATIVE for fever, chills, change in weight  INTEGUMENTARU/SKIN: NEGATIVE for worrisome rashes, moles or lesions  EYES: NEGATIVE for vision changes or irritation  ENT: NEGATIVE for ear, mouth and throat problems  RESP: NEGATIVE for significant cough or SOB  BREAST: NEGATIVE for masses, tenderness or discharge  CV: NEGATIVE for chest pain, palpitations or peripheral edema  GI: NEGATIVE for nausea, abdominal pain, heartburn, or change in bowel habits  : NEGATIVE for unusual urinary or vaginal symptoms. Periods are regular and light.   MUSCULOSKELETAL: NEGATIVE for significant arthralgias or myalgia  NEURO: NEGATIVE for weakness, dizziness or paresthesias  PSYCHIATRIC: NEGATIVE for changes in mood or affect    OBJECTIVE:   /82 (BP Location: Left arm, Patient Position: Chair, Cuff Size: Adult Large)   Pulse 88   Temp 98.3  F (36.8  C) (Tympanic)   Resp 18   Ht 1.676 m (5' 6\")   Wt 130.3 kg (287 lb 3.2 oz)   Breastfeeding No   BMI 46.36 kg/m    EXAM:  GENERAL: healthy, alert and no distress  EYES: Eyes grossly normal to inspection  RESP: breathing comfortably on room air with no appreciable cough or wheeze  BREAST: normal without masses, tenderness or nipple discharge and no palpable axillary masses or adenopathy  CV: regular rate, warm and well-perfused, normatensive   ABDOMEN: soft, nontender, no hepatosplenomegaly, no masses and bowel sounds normal   (female): normal female external genitalia, normal urethral meatus, vaginal mucosa pink, moist, well rugated, and normal cervix/adnexa/uterus without masses or discharge  MS: no gross musculoskeletal defects noted, no edema  SKIN: no suspicious lesions or rashes  NEURO: Normal strength and tone, mentation intact and speech normal  PSYCH: mentation appears normal, affect normal/bright    Diagnostic Test Results:  Labs reviewed in Epic    ASSESSMENT/PLAN:   1. Cervical " "cancer screening  - Pap thin layer screen with HPV - recommended age 30 - 65 years    2. Encounter for removal of intrauterine contraceptive device    3. Routine general medical examination at a health care facility  - Hemoglobin A1c  - INSERTION INTRAUTERINE DEVICE  - REMOVE INTRAUTERINE DEVICE    4. Encounter for removal and reinsertion of intrauterine contraceptive device  - Hemoglobin A1c  - levonorgestrel (MIRENA) 20 MCG/24HR IUD 20 mcg  - levonorgestrel (MIRENA) 20 MCG/24HR IUD; 1 each (20 mcg) by Intrauterine route once  - INSERTION INTRAUTERINE DEVICE  - REMOVE INTRAUTERINE DEVICE    5. mirena IUD 8/18/2021      Patient has been advised of split billing requirements and indicates understanding: Yes  COUNSELING:   Reviewed preventive health counseling, as reflected in patient instructions  Special attention given to:        breast awareness, Ca/Vit D        Regular exercise       Healthy diet/nutrition    Estimated body mass index is 46.36 kg/m  as calculated from the following:    Height as of this encounter: 1.676 m (5' 6\").    Weight as of this encounter: 130.3 kg (287 lb 3.2 oz).    Weight management plan: started weight watchers    She reports that she has never smoked. She has never used smokeless tobacco.     IUD Removal and Replacement Procedure Note    Malika So  1985  [id number]    The patient was counseled on the risks (including risk of infection, uterine perforation, cramping), benefits (high efficacy, low maintenance birth control, reduced risk of uterine cancer and hyperplasia, improvement in menstrual bleeding), and alternatives of the procedure. Previous Mirena IUD was placed ~5 years ago, known malpositioned IUD. Desires to continue this method. Verbal and written consent were obtained.  She is also s/p a tubal ligation.     Technique: The patient was placed in the dorsal lithotomy position.  A speculum was placed in the vagina and the cervix with IUD strings were " visualized, grasped with a ring forcep and removed intact. The IUD was placed in a sterile cup and disposed in hazardous waste. The Mirena IUD was loaded, advanced to the fundus, pulled back 1cm, deployed and advanced to the fundus. Using the insertor as a sound, the uterus sounded to 9 cm. IUD strings were cut 3cm from the external cervical os. The patient tolerated the procedure well and there were no complications. EBL: 0cc.     Namrata Durand MD  OB/GYN         Counseling Resources:  ATP IV Guidelines  Pooled Cohorts Equation Calculator  Breast Cancer Risk Calculator  BRCA-Related Cancer Risk Assessment: FHS-7 Tool  FRAX Risk Assessment  ICSI Preventive Guidelines  Dietary Guidelines for Americans, 2010  USDA's MyPlate  ASA Prophylaxis  Lung CA Screening    Namrata Durand MD  [facility] [place]

## 2021-08-19 ASSESSMENT — ANXIETY QUESTIONNAIRES: GAD7 TOTAL SCORE: 7

## 2021-08-20 LAB
BKR LAB AP GYN ADEQUACY: NORMAL
BKR LAB AP GYN INTERPRETATION: NORMAL
BKR LAB AP HPV REFLEX: NORMAL
BKR LAB AP PREVIOUS ABNORMAL: NORMAL
PATH REPORT.COMMENTS IMP SPEC: NORMAL
PATH REPORT.RELEVANT HX SPEC: NORMAL

## 2021-08-24 LAB
HUMAN PAPILLOMA VIRUS 16 DNA: NEGATIVE
HUMAN PAPILLOMA VIRUS 18 DNA: NEGATIVE
HUMAN PAPILLOMA VIRUS FINAL DIAGNOSIS: NORMAL
HUMAN PAPILLOMA VIRUS OTHER HR: NEGATIVE

## 2021-09-25 ENCOUNTER — HEALTH MAINTENANCE LETTER (OUTPATIENT)
Age: 36
End: 2021-09-25

## 2021-10-01 ENCOUNTER — DOCUMENTATION ONLY (OUTPATIENT)
Dept: OTHER | Facility: CLINIC | Age: 36
End: 2021-10-01

## 2022-09-23 DIAGNOSIS — F41.9 ANXIETY: ICD-10-CM

## 2022-09-23 RX ORDER — CLONAZEPAM 2 MG/1
2 TABLET ORAL 2 TIMES DAILY PRN
Qty: 60 TABLET | Refills: 0 | Status: SHIPPED | OUTPATIENT
Start: 2022-09-23

## 2022-09-23 RX ORDER — SERTRALINE HYDROCHLORIDE 100 MG/1
200 TABLET, FILM COATED ORAL DAILY
Qty: 60 TABLET | Refills: 1 | Status: SHIPPED | OUTPATIENT
Start: 2022-09-23

## 2022-09-27 ASSESSMENT — PATIENT HEALTH QUESTIONNAIRE - PHQ9
10. IF YOU CHECKED OFF ANY PROBLEMS, HOW DIFFICULT HAVE THESE PROBLEMS MADE IT FOR YOU TO DO YOUR WORK, TAKE CARE OF THINGS AT HOME, OR GET ALONG WITH OTHER PEOPLE: EXTREMELY DIFFICULT
SUM OF ALL RESPONSES TO PHQ QUESTIONS 1-9: 21
SUM OF ALL RESPONSES TO PHQ QUESTIONS 1-9: 21

## 2022-09-27 ASSESSMENT — ANXIETY QUESTIONNAIRES
8. IF YOU CHECKED OFF ANY PROBLEMS, HOW DIFFICULT HAVE THESE MADE IT FOR YOU TO DO YOUR WORK, TAKE CARE OF THINGS AT HOME, OR GET ALONG WITH OTHER PEOPLE?: EXTREMELY DIFFICULT
GAD7 TOTAL SCORE: 12
5. BEING SO RESTLESS THAT IT IS HARD TO SIT STILL: NOT AT ALL
6. BECOMING EASILY ANNOYED OR IRRITABLE: NEARLY EVERY DAY
GAD7 TOTAL SCORE: 12
IF YOU CHECKED OFF ANY PROBLEMS ON THIS QUESTIONNAIRE, HOW DIFFICULT HAVE THESE PROBLEMS MADE IT FOR YOU TO DO YOUR WORK, TAKE CARE OF THINGS AT HOME, OR GET ALONG WITH OTHER PEOPLE: EXTREMELY DIFFICULT
7. FEELING AFRAID AS IF SOMETHING AWFUL MIGHT HAPPEN: SEVERAL DAYS
2. NOT BEING ABLE TO STOP OR CONTROL WORRYING: SEVERAL DAYS
4. TROUBLE RELAXING: NEARLY EVERY DAY
3. WORRYING TOO MUCH ABOUT DIFFERENT THINGS: SEVERAL DAYS
GAD7 TOTAL SCORE: 12
7. FEELING AFRAID AS IF SOMETHING AWFUL MIGHT HAPPEN: SEVERAL DAYS
1. FEELING NERVOUS, ANXIOUS, OR ON EDGE: NEARLY EVERY DAY

## 2022-09-30 ENCOUNTER — VIRTUAL VISIT (OUTPATIENT)
Dept: FAMILY MEDICINE | Facility: CLINIC | Age: 37
End: 2022-09-30
Payer: COMMERCIAL

## 2022-09-30 DIAGNOSIS — F41.9 ANXIETY: ICD-10-CM

## 2022-09-30 DIAGNOSIS — Z13.0 SCREENING FOR DISORDER OF BLOOD AND BLOOD-FORMING ORGANS: ICD-10-CM

## 2022-09-30 DIAGNOSIS — M25.50 MULTIPLE JOINT PAIN: Primary | ICD-10-CM

## 2022-09-30 DIAGNOSIS — Z13.1 SCREENING FOR DIABETES MELLITUS (DM): ICD-10-CM

## 2022-09-30 DIAGNOSIS — R79.82 ELEVATED C-REACTIVE PROTEIN (CRP): ICD-10-CM

## 2022-09-30 DIAGNOSIS — E66.01 MORBID OBESITY (H): ICD-10-CM

## 2022-09-30 DIAGNOSIS — Z13.29 SCREENING FOR THYROID DISORDER: ICD-10-CM

## 2022-09-30 DIAGNOSIS — Z13.6 CARDIOVASCULAR SCREENING; LDL GOAL LESS THAN 160: ICD-10-CM

## 2022-09-30 DIAGNOSIS — F33.1 MODERATE EPISODE OF RECURRENT MAJOR DEPRESSIVE DISORDER (H): ICD-10-CM

## 2022-09-30 PROCEDURE — 99214 OFFICE O/P EST MOD 30 MIN: CPT | Mod: 95 | Performed by: NURSE PRACTITIONER

## 2022-09-30 ASSESSMENT — PATIENT HEALTH QUESTIONNAIRE - PHQ9
SUM OF ALL RESPONSES TO PHQ QUESTIONS 1-9: 21
10. IF YOU CHECKED OFF ANY PROBLEMS, HOW DIFFICULT HAVE THESE PROBLEMS MADE IT FOR YOU TO DO YOUR WORK, TAKE CARE OF THINGS AT HOME, OR GET ALONG WITH OTHER PEOPLE: EXTREMELY DIFFICULT

## 2022-09-30 ASSESSMENT — ANXIETY QUESTIONNAIRES: GAD7 TOTAL SCORE: 12

## 2022-09-30 NOTE — PATIENT INSTRUCTIONS
PLAN:   1.   Symptomatic therapy suggested: Continue current medications as prescribed.   2.  Orders Placed This Encounter   Procedures    REVIEW OF HEALTH MAINTENANCE PROTOCOL ORDERS    Lipid panel reflex to direct LDL Fasting    CBC with platelets    Comprehensive metabolic panel    TSH with free T4 reflex    Erythrocyte sedimentation rate auto    CRP inflammation    Rheumatoid factor    Anti Nuclear Federica IgG by IFA with Reflex    Lyme Disease Total Abs Bld with Reflex to Confirm CLIA     3. Patient needs to follow up in if no improvement,or sooner if worsening of symptoms or other symptoms develop.  FUTURE LABS:       - Schedule a fasting blood draw   FOLLOW UP WITH SPECIALIST :Psychiatry  Will follow up and/or notify patient of  results via My Chart to determine further need for followup  Schedule physical exam

## 2022-09-30 NOTE — PROGRESS NOTES
Malika is a 36 year old who is being evaluated via a billable video visit.      How would you like to obtain your AVS? MyChart  If the video visit is dropped, the invitation should be resent by: Text to cell phone: [phone]  Will anyone else be joining your video visit? No              Subjective   Malika is a 36 year old accompanied by her self, presenting for the following health issues:  Recheck Medication      History of Present Illness       Mental Health Follow-up:  Patient presents to follow-up on Depression & Anxiety.Patient's depression since last visit has been:  Worse  The patient is having other symptoms associated with depression.  Patient's anxiety since last visit has been:  Worse  The patient is having other symptoms associated with anxiety.  Any significant life events: relationship concerns, job concerns, financial concerns, housing concerns, grief or loss and health concerns  Patient is feeling anxious or having panic attacks.  Patient has no concerns about alcohol or drug use.    She eats 0-1 servings of fruits and vegetables daily.She consumes 3 sweetened beverage(s) daily.She exercises with enough effort to increase her heart rate 10 to 19 minutes per day.  She exercises with enough effort to increase her heart rate 3 or less days per week.   She is taking medications regularly.    Today's PHQ-9         PHQ-9 Total Score: 21    PHQ-9 Q9 Thoughts of better off dead/self-harm past 2 weeks :   Not at all    How difficult have these problems made it for you to do your work, take care of things at home, or get along with other people: Extremely difficult  Today's COMPA-7 Score: 12         Depression and Anxiety Follow-Up    How are you doing with your depression since your last visit? Worsened     How are you doing with your anxiety since your last visit?  Worsened     Are you having other symptoms that might be associated with depression or anxiety? No    Have you had a significant life event?  Relationship Concerns     Do you have any concerns with your use of alcohol or other drugs? No  Her psychiatrist retired at [facility] and did not realize this   Then tried to get her refills and did not hear from him from 4 to 5 days   Her therapist that she goes to has helped her get in with a new psychiatrist on October 13   Is also going through a divorce so stress is high   Social History     Tobacco Use     Smoking status: Never Smoker     Smokeless tobacco: Never Used   Vaping Use     Vaping Use: Never used   Substance Use Topics     Alcohol use: No     Alcohol/week: 0.8 standard drinks     Drug use: No     PHQ 4/9/2021 8/18/2021 9/27/2022   PHQ-9 Total Score 8 7 21   Q9: Thoughts of better off dead/self-harm past 2 weeks Not at all Not at all Not at all     COMPA-7 SCORE 10/6/2020 8/18/2021 9/27/2022   Total Score - - -   Total Score - - 12 (moderate anxiety)   Total Score 7 7 12     Last PHQ-9 9/27/2022   1.  Little interest or pleasure in doing things 3   2.  Feeling down, depressed, or hopeless 3   3.  Trouble falling or staying asleep, or sleeping too much 1   4.  Feeling tired or having little energy 3   5.  Poor appetite or overeating 3   6.  Feeling bad about yourself 3   7.  Trouble concentrating 3   8.  Moving slowly or restless 2   Q9: Thoughts of better off dead/self-harm past 2 weeks 0   PHQ-9 Total Score 21   Difficulty at work, home, or with people -     COMPA-7  9/27/2022   1. Feeling nervous, anxious, or on edge 3   2. Not being able to stop or control worrying 1   3. Worrying too much about different things 1   4. Trouble relaxing 3   5. Being so restless that it is hard to sit still 0   6. Becoming easily annoyed or irritable 3   7. Feeling afraid, as if something awful might happen 1   COMPA-7 Total Score 12   If you checked any problems, how difficult have they made it for you to do your work, take care of things at home, or get along with other people? Extremely difficult       Suicide  Assessment Five-step Evaluation and Treatment (SAFE-T)    Also in the last few weeks has noted more joint pain  Mom got recently diagnosed lyme   Has pain in right shoulder and right knee     Labs reviewed in EPIC  BP Readings from Last 3 Encounters:   21 125/82   21 125/84   21 133/81    Wt Readings from Last 3 Encounters:   21 130.3 kg (287 lb 3.2 oz)   21 131.3 kg (289 lb 8 oz)   21 134.7 kg (297 lb)                  Patient Active Problem List   Diagnosis     Mild major depression (H)     Anxiety     Non-specific low back pain     Morbid obesity (H)     Bariatric surgery status     GERD without esophagitis     mirena IUD 2021     Past Surgical History:   Procedure Laterality Date     C EACH ADD TOOTH EXTRACTION      multiple tooth extractions      SECTION N/A 2016    Procedure:  SECTION;  Surgeon: Kelli Osullivan MD;  [facility]     CHOLECYSTECTOMY, LAPOROSCOPIC       LAP ADJUSTABLE GASTRIC BAND      [facility] lost over 150 lb      LAPAROSCOPIC SALPINGECTOMY Bilateral 2018    Procedure: LAPAROSCOPIC SALPINGECTOMY;  Bilateral Laparoscopic Salpingectomy ;  Surgeon: Celeste Mai MD;  [facility]     [facility]       TONSILLECTOMY         Social History     Tobacco Use     Smoking status: Never Smoker     Smokeless tobacco: Never Used   Substance Use Topics     Alcohol use: No     Alcohol/week: 0.8 standard drinks     Family History   Problem Relation Age of Onset     C.A.D. Maternal Grandfather      Diabetes Maternal Grandfather      Hypertension Maternal Grandfather      Obesity Maternal Grandfather      Hyperlipidemia Maternal Grandfather      Coronary Artery Disease Maternal Grandfather      Colon Polyps Maternal Grandfather      Heart Failure Paternal Grandmother         CHF     Anemia Paternal Grandmother      Glaucoma Father         corneal transplant. congenital glaucoma     Obesity Maternal Grandmother      Other  "Cancer Maternal Grandmother         cirrosis of liver. non-drinker     Obesity Other         Had GI surgery     Hypertension Other         mother's side of family     Arthritis Other         both sides of family     Hypertension Other      Obesity Other          Current Outpatient Medications   Medication Sig Dispense Refill     buPROPion (WELLBUTRIN SR) 100 MG 12 hr tablet        clonazePAM (KLONOPIN) 2 MG tablet Take 1 tablet (2 mg) by mouth 2 times daily as needed for anxiety - use sparingly 60 tablet 0     diclofenac (VOLTAREN) 1 % topical gel Apply 2 g topically 4 times daily 100 g 3     levonorgestrel (MIRENA) 20 MCG/24HR IUD 1 each (20 mcg) by Intrauterine route once       levonorgestrel (MIRENA) 20 MCG/24HR IUD 1 each by Intrauterine route once       naproxen (NAPROSYN) 500 MG tablet Take 1 tablet (500 mg) by mouth 2 times daily as needed for moderate pain 60 tablet 1     sertraline (ZOLOFT) 100 MG tablet Take 2 tablets (200 mg) by mouth daily 60 tablet 1     traZODone (DESYREL) 100 MG tablet        Allergies   Allergen Reactions     Adhesive Tape      blisters     Chlorhexidine      Pruritic rash     Fenugreek [Trigonella Foenum-Graecum] Hives     Ragweeds Hives     Colophony  [Pinus Strobus] Blisters, Dermatitis, Hives, Itching and Rash       Review of Systems   Constitutional, HEENT, cardiovascular, pulmonary, gi and gu systems are negative, except as otherwise noted.      Objective           Vitals:  No vitals were obtained today due to virtual visit.  Estimated body mass index is 46.36 kg/m  as calculated from the following:    Height as of 8/18/21: 1.676 m (5' 6\").    Weight as of 8/18/21: 130.3 kg (287 lb 3.2 oz).    Physical Exam   GENERAL: Healthy, alert and no distress  EYES: Eyes grossly normal to inspection and conjunctivae and sclerae normal  HENT: normal cephalic/atraumatic and oral mucous membranes moist  RESP: No audible wheeze, cough, or visible cyanosis.  No visible retractions or increased " work of breathing.    MS: No gross musculoskeletal defects noted.  Normal range of motion.  No visible edema.  SKIN: Visible skin clear. No significant rash, abnormal pigmentation or lesions.  NEURO: Cranial nerves grossly intact.  Mentation and speech appropriate for age.  PSYCH: Mentation appears normal, affect normal/bright, judgement and insight intact, normal speech and appearance well-groomed.    Diagnostic Test Results:   Diagnostic Test Results:  Labs reviewed in Epic  none       Assessment & Plan     Multiple joint pain  Will follow up and/or notify patient of  results via My Chart to determine further need for followup  - Erythrocyte sedimentation rate auto  - CRP inflammation  - Rheumatoid factor  - Anti Nuclear Federica IgG by IFA with Reflex  - Lyme Disease Total Abs Bld with Reflex to Confirm CLIA    Anxiety  Continue current medications as prescribed.   FOLLOW UP WITH SPECIALIST :Psychiatry      Moderate episode of recurrent major depressive disorder (H)  FOLLOW UP WITH SPECIALIST :Psychiatry      CARDIOVASCULAR SCREENING; LDL GOAL LESS THAN 160  - Lipid panel reflex to direct LDL Fasting    Screening for diabetes mellitus (DM)  - Comprehensive metabolic panel    Screening for disorder of blood and blood-forming organs  - CBC with platelets    Screening for thyroid disorder  - TSH with free T4 reflex    Morbid obesity (H)  Healthy diet and exercise reviewed.  Limit pop and juice intake.  Risks of obesity discussed.  Encourage exercise.  Limit TV/Computer/game time to one hour a day.      PLAN:    Patient needs to follow up in if no improvement,or sooner if worsening of symptoms or other symptoms develop.  FUTURE LABS:       - Schedule a fasting blood draw   FOLLOW UP WITH SPECIALIST :Psychiatry  Will follow up and/or notify patient of  results via My Chart to determine further need for followup  Schedule physical exam     Prescription drug management   Time spent doing chart review, history and exam,  documentation and further activities per the note       See Patient Instructions  Patient Instructions     PLAN:   1.   Symptomatic therapy suggested: Continue current medications as prescribed.   2.  Orders Placed This Encounter   Procedures     REVIEW OF HEALTH MAINTENANCE PROTOCOL ORDERS     Lipid panel reflex to direct LDL Fasting     CBC with platelets     Comprehensive metabolic panel     TSH with free T4 reflex     Erythrocyte sedimentation rate auto     CRP inflammation     Rheumatoid factor     Anti Nuclear Federica IgG by IFA with Reflex     Lyme Disease Total Abs Bld with Reflex to Confirm CLIA     3. Patient needs to follow up in if no improvement,or sooner if worsening of symptoms or other symptoms develop.  FUTURE LABS:       - Schedule a fasting blood draw   FOLLOW UP WITH SPECIALIST :Psychiatry  Will follow up and/or notify patient of  results via My Chart to determine further need for followup  Schedule physical exam         Return in about 1 month (around 10/30/2022), or if symptoms worsen or fail to improve, for Schedule physical.    MATT Og CNP  [facility]          Video-Visit Details    Video Start Time: 1:40 pm     Type of service:  Video Visit    Video End Time:2:03 PM    Originating Location (pt. Location): Home    Distant Location (provider location):  [facility]     Platform used for Video Visit: Aleksandra

## 2022-10-07 ENCOUNTER — LAB (OUTPATIENT)
Dept: LAB | Facility: CLINIC | Age: 37
End: 2022-10-07
Payer: COMMERCIAL

## 2022-10-07 DIAGNOSIS — Z13.0 SCREENING FOR DISORDER OF BLOOD AND BLOOD-FORMING ORGANS: ICD-10-CM

## 2022-10-07 DIAGNOSIS — Z13.1 SCREENING FOR DIABETES MELLITUS (DM): ICD-10-CM

## 2022-10-07 DIAGNOSIS — Z13.29 SCREENING FOR THYROID DISORDER: ICD-10-CM

## 2022-10-07 DIAGNOSIS — Z13.6 CARDIOVASCULAR SCREENING; LDL GOAL LESS THAN 160: ICD-10-CM

## 2022-10-07 DIAGNOSIS — M25.50 MULTIPLE JOINT PAIN: ICD-10-CM

## 2022-10-07 LAB
ALBUMIN SERPL BCG-MCNC: 4.3 G/DL (ref 3.5–5.2)
ALP SERPL-CCNC: 97 U/L (ref 35–104)
ALT SERPL W P-5'-P-CCNC: 19 U/L (ref 10–35)
ANION GAP SERPL CALCULATED.3IONS-SCNC: 9 MMOL/L (ref 7–15)
AST SERPL W P-5'-P-CCNC: 23 U/L (ref 10–35)
B BURGDOR IGG+IGM SER QL: 0.07
BILIRUB SERPL-MCNC: 0.3 MG/DL
BUN SERPL-MCNC: 14 MG/DL (ref 6–20)
CALCIUM SERPL-MCNC: 9.3 MG/DL (ref 8.6–10)
CHLORIDE SERPL-SCNC: 102 MMOL/L (ref 98–107)
CHOLEST SERPL-MCNC: 178 MG/DL
CREAT SERPL-MCNC: 0.73 MG/DL (ref 0.51–0.95)
CRP SERPL-MCNC: 6.48 MG/L
DEPRECATED HCO3 PLAS-SCNC: 28 MMOL/L (ref 22–29)
ERYTHROCYTE [DISTWIDTH] IN BLOOD BY AUTOMATED COUNT: 13.2 % (ref 10–15)
ERYTHROCYTE [SEDIMENTATION RATE] IN BLOOD BY WESTERGREN METHOD: 14 MM/HR (ref 0–20)
GFR SERPL CREATININE-BSD FRML MDRD: >90 ML/MIN/1.73M2
GLUCOSE SERPL-MCNC: 87 MG/DL (ref 70–99)
HCT VFR BLD AUTO: 41.3 % (ref 35–47)
HDLC SERPL-MCNC: 52 MG/DL
HGB BLD-MCNC: 13.5 G/DL (ref 11.7–15.7)
LDLC SERPL CALC-MCNC: 106 MG/DL
MCH RBC QN AUTO: 29.4 PG (ref 26.5–33)
MCHC RBC AUTO-ENTMCNC: 32.7 G/DL (ref 31.5–36.5)
MCV RBC AUTO: 90 FL (ref 78–100)
NONHDLC SERPL-MCNC: 126 MG/DL
PLATELET # BLD AUTO: 287 10E3/UL (ref 150–450)
POTASSIUM SERPL-SCNC: 4.2 MMOL/L (ref 3.4–5.3)
PROT SERPL-MCNC: 7 G/DL (ref 6.4–8.3)
RBC # BLD AUTO: 4.59 10E6/UL (ref 3.8–5.2)
SODIUM SERPL-SCNC: 139 MMOL/L (ref 136–145)
TRIGL SERPL-MCNC: 98 MG/DL
TSH SERPL DL<=0.005 MIU/L-ACNC: 3.11 UIU/ML (ref 0.3–4.2)
WBC # BLD AUTO: 6 10E3/UL (ref 4–11)

## 2022-10-07 PROCEDURE — 85027 COMPLETE CBC AUTOMATED: CPT

## 2022-10-07 PROCEDURE — 80053 COMPREHEN METABOLIC PANEL: CPT

## 2022-10-07 PROCEDURE — 86431 RHEUMATOID FACTOR QUANT: CPT

## 2022-10-07 PROCEDURE — 84443 ASSAY THYROID STIM HORMONE: CPT

## 2022-10-07 PROCEDURE — 36415 COLL VENOUS BLD VENIPUNCTURE: CPT

## 2022-10-07 PROCEDURE — 86140 C-REACTIVE PROTEIN: CPT

## 2022-10-07 PROCEDURE — 86038 ANTINUCLEAR ANTIBODIES: CPT

## 2022-10-07 PROCEDURE — 86618 LYME DISEASE ANTIBODY: CPT

## 2022-10-07 PROCEDURE — 80061 LIPID PANEL: CPT

## 2022-10-07 PROCEDURE — 85652 RBC SED RATE AUTOMATED: CPT

## 2022-10-10 LAB
ANA SER QL IF: NEGATIVE
RHEUMATOID FACT SER NEPH-ACNC: <6 IU/ML

## 2022-10-10 NOTE — RESULT ENCOUNTER NOTE
Pranay So,    Attached are your test results.  Lyme is negative   -Normal red blood cell (hgb) levels, normal white blood cell count and normal platelet levels.  -LDL(bad) cholesterol level is elevated which can increase your heart disease risk.  A diet high in fat and simple carbohydrates, genetics and being overweight can contribute to this. ADVISE: exercising 150 minutes of aerobic exercise per week (30 minutes for 5 days per week or 50 minutes for 3 days per week are options) and eating a low saturated fat/low carbohydrate diet are helpful to improve this. In 12 months, you should recheck your fasting cholesterol panel by scheduling a lab-only appointment.  -Liver and gallbladder tests are normal (ALT,AST, Alk phos, bilirubin), kidney function is normal (Cr, GFR), sodium is normal, potassium is normal, calcium is normal, glucose is normal.  -TSH (thyroid stimulating hormone) level is normal which indicates normal thyroid function.  One of your inflammatory markers is slightly elevated but rest is normal   Would recommend rechecking this level in a month    Please contact us if you have any questions.    Selina Lancaster, CNP

## 2022-10-10 NOTE — RESULT ENCOUNTER NOTE
Pranay So,    Attached are your test results.  Rheumatoid marker is normal    Please contact us if you have any questions.    Selina Lancaster, CNP

## 2022-10-11 NOTE — RESULT ENCOUNTER NOTE
Pranay So,    Attached are your test results.  Lupus screen is negative    Please contact us if you have any questions.    Selina Lancaster, CNP

## 2022-11-25 DIAGNOSIS — F41.9 ANXIETY: ICD-10-CM

## 2022-11-28 RX ORDER — CLONAZEPAM 2 MG/1
TABLET ORAL
Qty: 60 TABLET | Refills: 0 | OUTPATIENT
Start: 2022-11-28

## 2022-12-22 ASSESSMENT — ENCOUNTER SYMPTOMS: BREAST MASS: 0

## 2022-12-22 ASSESSMENT — PATIENT HEALTH QUESTIONNAIRE - PHQ9
10. IF YOU CHECKED OFF ANY PROBLEMS, HOW DIFFICULT HAVE THESE PROBLEMS MADE IT FOR YOU TO DO YOUR WORK, TAKE CARE OF THINGS AT HOME, OR GET ALONG WITH OTHER PEOPLE: VERY DIFFICULT
SUM OF ALL RESPONSES TO PHQ QUESTIONS 1-9: 19
SUM OF ALL RESPONSES TO PHQ QUESTIONS 1-9: 19

## 2022-12-23 ENCOUNTER — OFFICE VISIT (OUTPATIENT)
Dept: FAMILY MEDICINE | Facility: CLINIC | Age: 37
End: 2022-12-23
Payer: COMMERCIAL

## 2022-12-23 VITALS
HEIGHT: 67 IN | HEART RATE: 88 BPM | SYSTOLIC BLOOD PRESSURE: 138 MMHG | WEIGHT: 293 LBS | TEMPERATURE: 98 F | RESPIRATION RATE: 21 BRPM | DIASTOLIC BLOOD PRESSURE: 85 MMHG | OXYGEN SATURATION: 97 % | BODY MASS INDEX: 45.99 KG/M2

## 2022-12-23 DIAGNOSIS — N89.8 VAGINAL ODOR: ICD-10-CM

## 2022-12-23 DIAGNOSIS — Z01.84 IMMUNITY STATUS TESTING: ICD-10-CM

## 2022-12-23 DIAGNOSIS — D22.9 NUMEROUS MOLES: ICD-10-CM

## 2022-12-23 DIAGNOSIS — Z00.00 ROUTINE GENERAL MEDICAL EXAMINATION AT A HEALTH CARE FACILITY: Primary | ICD-10-CM

## 2022-12-23 LAB
CLUE CELLS: ABNORMAL
TRICHOMONAS, WET PREP: ABNORMAL
WBC'S/HIGH POWER FIELD, WET PREP: ABNORMAL
YEAST, WET PREP: ABNORMAL

## 2022-12-23 PROCEDURE — 99395 PREV VISIT EST AGE 18-39: CPT | Performed by: NURSE PRACTITIONER

## 2022-12-23 PROCEDURE — 36415 COLL VENOUS BLD VENIPUNCTURE: CPT | Performed by: NURSE PRACTITIONER

## 2022-12-23 PROCEDURE — 87210 SMEAR WET MOUNT SALINE/INK: CPT | Performed by: NURSE PRACTITIONER

## 2022-12-23 PROCEDURE — 86706 HEP B SURFACE ANTIBODY: CPT | Performed by: NURSE PRACTITIONER

## 2022-12-23 PROCEDURE — 86704 HEP B CORE ANTIBODY TOTAL: CPT | Performed by: NURSE PRACTITIONER

## 2022-12-23 PROCEDURE — 99213 OFFICE O/P EST LOW 20 MIN: CPT | Mod: 25 | Performed by: NURSE PRACTITIONER

## 2022-12-23 PROCEDURE — 87340 HEPATITIS B SURFACE AG IA: CPT | Performed by: NURSE PRACTITIONER

## 2022-12-23 ASSESSMENT — PAIN SCALES - GENERAL: PAINLEVEL: MILD PAIN (3)

## 2022-12-23 ASSESSMENT — ENCOUNTER SYMPTOMS: BREAST MASS: 0

## 2022-12-23 NOTE — PATIENT INSTRUCTIONS
PLAN:   1.   Symptomatic therapy suggested: Continue current medications as prescribed.   Increase calcium to 1000mg and 2000iu Vit D  2.  Orders Placed This Encounter   Procedures    REVIEW OF HEALTH MAINTENANCE PROTOCOL ORDERS    Adult Dermatology Referral       3. Patient needs to follow up in if no improvement,or sooner if worsening of symptoms or other symptoms develop.  CONSULTATION/REFERRAL to dermatology   Will follow up and/or notify patient of  results via My Chart to determine further need for followup  Follow up office visit in one year for annual health maintenance exam, sooner PRN.    Preventive Health Recommendations  Female Ages 26 - 39  Yearly exam:   See your health care provider every year in order to  Review health changes.   Discuss preventive care.    Review your medicines if you your doctor has prescribed any.    Until age 30: Get a Pap test every three years (more often if you have had an abnormal result).    After age 30: Talk to your doctor about whether you should have a Pap test every 3 years or have a Pap test with HPV screening every 5 years.   You do not need a Pap test if your uterus was removed (hysterectomy) and you have not had cancer.  You should be tested each year for STDs (sexually transmitted diseases), if you're at risk.   Talk to your provider about how often to have your cholesterol checked.  If you are at risk for diabetes, you should have a diabetes test (fasting glucose).  Shots: Get a flu shot each year. Get a tetanus shot every 10 years.   Nutrition:   Eat at least 5 servings of fruits and vegetables each day.  Eat whole-grain bread, whole-wheat pasta and brown rice instead of white grains and rice.  Get adequate Calcium and Vitamin D.     Lifestyle  Exercise at least 150 minutes a week (30 minutes a day, 5 days of the week). This will help you control your weight and prevent disease.  Limit alcohol to one drink per day.  No smoking.   Wear sunscreen to prevent skin  cancer.  See your dentist every six months for an exam and cleaning.

## 2022-12-23 NOTE — RESULT ENCOUNTER NOTE
Pranay So,    Attached are your test results.  -No signs of bacteria or yeast vaginal infections.   Please contact us if you have any questions.    Selina Lancaster, CNP

## 2022-12-23 NOTE — PROGRESS NOTES
Answers for HPI/ROS submitted by the patient on 2022  If you checked off any problems, how difficult have these problems made it for you to do your work, take care of things at home, or get along with other people?: Very difficult  PHQ9 TOTAL SCORE: 19       SUBJECTIVE:   CC: Malika is an 37 year old who presents for preventive health visit.     Patient has been advised of split billing requirements and indicates understanding: Yes  Healthy Habits:     Getting at least 3 servings of Calcium per day:  Yes    Bi-annual eye exam:  NO    Dental care twice a year:  Yes    Sleep apnea or symptoms of sleep apnea:  Excessive snoring    Diet:  Regular (no restrictions)    Frequency of exercise:  None    Taking medications regularly:  Yes    Medication side effects:  Not applicable    PHQ-2 Total Score: 5    Additional concerns today:  No      Today's PHQ-2 Score:   PHQ-2 (  [employer]) 2022   Q1: Little interest or pleasure in doing things 3   Q2: Feeling down, depressed or hopeless 2   PHQ-2 Score 5   PHQ-2 Total Score (12-17 Years)- Positive if 3 or more points; Administer PHQ-A if positive -   Q1: Little interest or pleasure in doing things Nearly every day   Q2: Feeling down, depressed or hopeless More than half the days   PHQ-2 Score 5       Social History     Tobacco Use     Smoking status: Former     Packs/day: 0.00     Years: 1.00     Pack years: 0.00     Types: Cigarettes, Dip, chew, snus or snuff     Start date: 1/10/2010     Quit date: 2013     Years since quittin.9     Smokeless tobacco: Former     Quit date: 2013   Substance Use Topics     Alcohol use: Yes     Alcohol/week: 0.8 standard drinks       Alcohol Use 2022   Prescreen: >3 drinks/day or >7 drinks/week? No   Prescreen: >3 drinks/day or >7 drinks/week? -       Reviewed orders with patient.  Reviewed health maintenance and updated orders accordingly - Yes  Lab work is in process  Labs reviewed in EPIC  BP Readings from Last 3  Encounters:   22 138/85   21 125/82   21 125/84    Wt Readings from Last 3 Encounters:   22 142.2 kg (313 lb 9.6 oz)   21 130.3 kg (287 lb 3.2 oz)   21 131.3 kg (289 lb 8 oz)                  Patient Active Problem List   Diagnosis     Mild major depression (H)     Anxiety     Non-specific low back pain     Morbid obesity (H)     Bariatric surgery status     GERD without esophagitis     mirena IUD 2021     Past Surgical History:   Procedure Laterality Date     C EACH ADD TOOTH EXTRACTION      multiple tooth extractions      SECTION N/A 2016    Procedure:  SECTION;  Surgeon: Kelli Osullivan MD;  Location: [facility]     CHOLECYSTECTOMY, LAPOROSCOPIC       LAP ADJUSTABLE GASTRIC BAND      [facility] lost over 150 lb      LAPAROSCOPIC SALPINGECTOMY Bilateral 2018    Procedure: LAPAROSCOPIC SALPINGECTOMY;  Bilateral Laparoscopic Salpingectomy ;  Surgeon: Celeste Mai MD;  Location:  OR     [facility]       TONSILLECTOMY         Social History     Tobacco Use     Smoking status: Former     Packs/day: 0.00     Years: 1.00     Pack years: 0.00     Types: Cigarettes, Dip, chew, snus or snuff     Start date: 1/10/2010     Quit date: 2013     Years since quittin.9     Smokeless tobacco: Former     Quit date: 2013   Substance Use Topics     Alcohol use: Yes     Alcohol/week: 0.8 standard drinks     Family History   Problem Relation Age of Onset     C.A.D. Maternal Grandfather      Diabetes Maternal Grandfather      Hypertension Maternal Grandfather      Obesity Maternal Grandfather      Hyperlipidemia Maternal Grandfather      Coronary Artery Disease Maternal Grandfather      Colon Polyps Maternal Grandfather      Heart Failure Paternal Grandmother         CHF     Anemia Paternal Grandmother      Glaucoma Father         corneal transplant. congenital glaucoma     Obesity Maternal Grandmother      Other Cancer Maternal Grandmother          cirrosis of liver. non-drinker     Obesity Other         Had GI surgery     Hypertension Other         mother's side of family     Arthritis Other         both sides of family     Hypertension Other      Obesity Other          Current Outpatient Medications   Medication Sig Dispense Refill     buPROPion (WELLBUTRIN SR) 100 MG 12 hr tablet        clonazePAM (KLONOPIN) 2 MG tablet Take 1 tablet (2 mg) by mouth 2 times daily as needed for anxiety - use sparingly 60 tablet 0     diclofenac (VOLTAREN) 1 % topical gel Apply 2 g topically 4 times daily 100 g 3     levonorgestrel (MIRENA) 20 MCG/24HR IUD 1 each (20 mcg) by Intrauterine route once       levonorgestrel (MIRENA) 20 MCG/24HR IUD 1 each by Intrauterine route once       naproxen (NAPROSYN) 500 MG tablet Take 1 tablet (500 mg) by mouth 2 times daily as needed for moderate pain 60 tablet 1     sertraline (ZOLOFT) 100 MG tablet Take 2 tablets (200 mg) by mouth daily 60 tablet 1     traZODone (DESYREL) 100 MG tablet        Allergies   Allergen Reactions     Adhesive Tape      blisters     Chlorhexidine      Pruritic rash     Fenugreek [Trigonella Foenum-Graecum] Hives     Ragweeds Hives     Colophony  [Pinus Strobus] Blisters, Dermatitis, Hives, Itching and Rash       Breast Cancer Screening:      Patient under 40 years of age: Routine Mammogram Screening not recommended.   Pertinent mammograms are reviewed under the imaging tab.    History of abnormal Pap smear: NO - age 30-65 PAP every 5 years with negative HPV co-testing recommended  PAP / HPV Latest Ref Rng & Units 8/18/2021 2/24/2016 4/6/2012   PAP   Negative for Intraepithelial Lesion or Malignancy (NILM) - -   PAP (Historical) - - NIL NIL   HPV16 Negative Negative Negative -   HPV18 Negative Negative Negative -   HRHPV Negative Negative Negative -     Reviewed and updated as needed this visit by clinical staff                  Reviewed and updated as needed this visit by Provider                 Past  Medical History:   Diagnosis Date     Depressive disorder      GERD without esophagitis      Hypertension     prior to weight loss     Latent tuberculosis     neg chest x-ray. treated with INH for 9 moniths     Mild major depression (H)     celexa 100 mg      Morbid obesity (H)      Urinary incontinence       Past Surgical History:   Procedure Laterality Date     C EACH ADD TOOTH EXTRACTION      multiple tooth extractions      SECTION N/A 2016    Procedure:  SECTION;  Surgeon: Kelli Osullivan MD;  [facility]     CHOLECYSTECTOMY, LAPOROSCOPIC       LAP ADJUSTABLE GASTRIC BAND      [facility] lost over 150 lb      LAPAROSCOPIC SALPINGECTOMY Bilateral 2018    Procedure: LAPAROSCOPIC SALPINGECTOMY;  Bilateral Laparoscopic Salpingectomy ;  Surgeon: Celeste Mai MD;  Location: [facility] OR     LASIK  2012     TONSILLECTOMY         Review of Systems   Breasts:  Negative for tenderness, breast mass and discharge.   Genitourinary: Positive for vaginal bleeding and vaginal discharge. Negative for pelvic pain.     CONSTITUTIONAL:POSITIVE  for weight gain and NEGATIVE  for anorexia, fatigue, malaise and myalgias  INTEGUMENTARY/SKIN: NEGATIVE for non-healing lesion  and POSITIVE for numerous moles   EYES: NEGATIVE for vision changes or irritation  ENT: NEGATIVE for ear, mouth and throat problems  RESP:NEGATIVE for significant cough or SOB  BREAST: NEGATIVE for masses, tenderness or discharge  CV: NEGATIVE for chest pain, palpitations or peripheral edema  GI: NEGATIVE for nausea, abdominal pain, heartburn, or change in bowel habits   female: no unusual urinary symptoms, no unusual vaginal symptoms and menses: spotty with IUD  Has some vaginal odor   MUSCULOSKELETAL:NEGATIVE for significant arthralgias or myalgia  NEURO: NEGATIVE for weakness, dizziness or paresthesias  ENDOCRINE: NEGATIVE for temperature intolerance, skin/hair changes  HEME/ALLERGY/IMMUNE: NEGATIVE for bleeding  "problems  PSYCHIATRIC: POSITIVE forHx anxiety and Hx depression and NEGATIVE forthoughts of hurting someone else and thoughts of self harm states has been eating her feelings. Divorce is done as of this month.      OBJECTIVE:   /85 (BP Location: Right arm, Patient Position: Sitting, Cuff Size: Adult Large)   Pulse 88   Temp 98  F (36.7  C) (Oral)   Resp 21   Ht 1.69 m (5' 6.54\")   Wt 142.2 kg (313 lb 9.6 oz)   SpO2 97%   BMI 49.81 kg/m    Physical Exam  GENERAL: healthy, alert and no distress  EYES: Eyes grossly normal to inspection and conjunctivae and sclerae normal  HENT: ear canals and TM's normal, nose and mouth without ulcers or lesions  NECK: no adenopathy, no asymmetry, masses, or scars and thyroid normal to palpation  RESP: lungs clear to auscultation - no rales, rhonchi or wheezes  BREAST: normal without masses, tenderness or nipple discharge and no palpable axillary masses or adenopathy  CV: regular rates and rhythm, no murmur, click or rub, peripheral pulses strong and no peripheral edema  ABDOMEN: soft, nontender, no hepatosplenomegaly, no masses and bowel sounds normal   (female): normal female external genitalia, normal urethral meatus, vaginal mucosa pink, moist, well rugated, and normal cervix/adnexa/uterus without masses or discharge  MS: no gross musculoskeletal defects noted, no edema  SKIN: no suspicious lesions or rashes. Numerous moles   NEURO: Normal strength and tone, mentation intact and speech normal  PSYCH: mentation appears normal, affect normal/bright  LYMPH: no cervical, supraclavicular, axillary, or inguinal adenopathy    Diagnostic Test Results:  Labs reviewed in Epic  Results for orders placed or performed in visit on 12/23/22   Hepatitis B Surface Antibody     Status: None   Result Value Ref [facility]    Hepatitis B Surface Antibody Instrument Value 121.46 <8.00 m[IU]/mL    Hepatitis B Surface Antibody Reactive    HEPATITIS B CORE ANTIBODY     Status: Normal   Result " Value Ref [facility]    Hepatitis B Core Antibody Total Nonreactive Nonreactive   HEPATITIS B SURFACE ANTIGEN     Status: Normal   Result Value Ref Range    Hepatitis B Surface Antigen Nonreactive Nonreactive   Wet preparation     Status: Abnormal    Specimen: Vagina; Swab   Result Value Ref [facility]    Trichomonas Absent Absent    Yeast Absent Absent    Clue Cells Absent Absent    WBCs/high power field 2+ (A) None       ASSESSMENT/PLAN:   Malika was seen today for physical.    Diagnoses and all orders for this visit:    Routine general medical examination at a health care facility  -     REVIEW OF HEALTH MAINTENANCE PROTOCOL ORDERS    Vaginal odor  -     Wet preparation    Numerous moles  -     Adult Dermatology Referral; Future    Immunity status testing  -     Hepatitis B Surface Antibody  -     HEPATITIS B CORE ANTIBODY  -     HEPATITIS B SURFACE ANTIGEN    PLAN:   Patient needs to follow up in if no improvement,or sooner if worsening of symptoms or other symptoms develop.  CONSULTATION/REFERRAL to dermatology   Will follow up and/or notify patient of  results via My Chart to determine further need for followup  Follow up office visit in one year for annual health maintenance exam, sooner PRN.    Patient has been advised of split billing requirements and indicates understanding: Yes      COUNSELING:  Reviewed preventive health counseling, as reflected in patient instructions  Special attention given to:        Regular exercise       Healthy diet/nutrition       Vision screening       Contraception       Family planning       Osteoporosis prevention/bone health       Advance Care Planning        She reports that she has never smoked. She has never used smokeless tobacco.          MAT[facility]

## 2022-12-24 LAB
HBV CORE AB SERPL QL IA: NONREACTIVE
HBV SURFACE AB SERPL IA-ACNC: 121.46 M[IU]/ML
HBV SURFACE AB SERPL IA-ACNC: REACTIVE M[IU]/ML
HBV SURFACE AG SERPL QL IA: NONREACTIVE

## 2022-12-26 NOTE — RESULT ENCOUNTER NOTE
Pranay So,    Attached are your test results.  You have antibodies to hepatitis B and do not  need vaccine    Please contact us if you have any questions.    Selina Lancaster, CNP

## 2023-01-17 ENCOUNTER — OFFICE VISIT (OUTPATIENT)
Dept: FAMILY MEDICINE | Facility: CLINIC | Age: 38
End: 2023-01-17
Payer: COMMERCIAL

## 2023-01-17 VITALS
HEART RATE: 84 BPM | RESPIRATION RATE: 20 BRPM | OXYGEN SATURATION: 97 % | BODY MASS INDEX: 48.63 KG/M2 | TEMPERATURE: 98.8 F | WEIGHT: 293 LBS | DIASTOLIC BLOOD PRESSURE: 80 MMHG | SYSTOLIC BLOOD PRESSURE: 126 MMHG

## 2023-01-17 DIAGNOSIS — Z11.3 SCREEN FOR STD (SEXUALLY TRANSMITTED DISEASE): ICD-10-CM

## 2023-01-17 DIAGNOSIS — N89.8 VAGINAL DISCHARGE: ICD-10-CM

## 2023-01-17 DIAGNOSIS — T19.2XXA RETAINED VAGINAL FOREIGN BODY, INITIAL ENCOUNTER: Primary | ICD-10-CM

## 2023-01-17 PROCEDURE — 87591 N.GONORRHOEAE DNA AMP PROB: CPT | Performed by: PHYSICIAN ASSISTANT

## 2023-01-17 PROCEDURE — 87389 HIV-1 AG W/HIV-1&-2 AB AG IA: CPT | Performed by: PHYSICIAN ASSISTANT

## 2023-01-17 PROCEDURE — 86803 HEPATITIS C AB TEST: CPT | Performed by: PHYSICIAN ASSISTANT

## 2023-01-17 PROCEDURE — 86780 TREPONEMA PALLIDUM: CPT | Performed by: PHYSICIAN ASSISTANT

## 2023-01-17 PROCEDURE — 36415 COLL VENOUS BLD VENIPUNCTURE: CPT | Performed by: PHYSICIAN ASSISTANT

## 2023-01-17 PROCEDURE — 87491 CHLMYD TRACH DNA AMP PROBE: CPT | Performed by: PHYSICIAN ASSISTANT

## 2023-01-17 PROCEDURE — 99214 OFFICE O/P EST MOD 30 MIN: CPT | Performed by: PHYSICIAN ASSISTANT

## 2023-01-17 PROCEDURE — 87210 SMEAR WET MOUNT SALINE/INK: CPT | Performed by: PHYSICIAN ASSISTANT

## 2023-01-17 RX ORDER — METRONIDAZOLE 500 MG/1
500 TABLET ORAL 2 TIMES DAILY
Qty: 14 TABLET | Refills: 0 | Status: SHIPPED | OUTPATIENT
Start: 2023-01-17 | End: 2023-01-24

## 2023-01-17 NOTE — PATIENT INSTRUCTIONS
Your Mirena strings look appropriately located  Your cup is successfully out  I will call you with the wet prep results, but I have sent over metronidazole to start for treatment of suspected BV.  Do not drink alcohol while taking this medicine.  Be checking MyChart over the course of the next few days for your STD test results.  We would call for any positive STD test results

## 2023-01-17 NOTE — PROGRESS NOTES
Patient presents with:  Menstrual Problem: Pt states since Saturday menstrual cup or suresh stuck in vagina       Clinical Decision Making:  Menstrual cup vaginal foreign body was successfully removed.  Malodorous watery brown vaginal discharge noted.  Suspect bacterial vaginosis.  Patient started on metronidazole.  STD testing in process.  No findings concerning for PID.  Mirena still appears to be in place.      ICD-10-CM    1. Retained vaginal foreign body, initial encounter  T19.2XXA       2. Screen for STD (sexually transmitted disease)  Z11.3 Wet preparation     Treponema Abs w Reflex to RPR and Titer     Hepatitis C antibody     HIV Antigen Antibody Combo     Chlamydia & Gonorrhea by PCR, [facility] - Clinic Collect      3. Vaginal discharge  N89.8 metroNIDAZOLE (FLAGYL) 500 MG tablet          Patient Instructions   1. Your Mirena strings look appropriately located  2. Your cup is successfully out  3. I will call you with the wet prep results, but I have sent over metronidazole to start for treatment of suspected BV.  Do not drink alcohol while taking this medicine.  4. Be checking MyChart over the course of the next few days for your STD test results.  We would call for any positive STD test results      HPI:  Malika So is a 37 year old female who presents today complaining of retained menstrual cup in the vagina since 3 days ago. For the past week patient has been experiencing foul-smelling vaginal discharge.  No known STD exposures, but patient is interested in STD testing.  No significant abdominal discomfort or fevers.  Patient has Mirena and was concerned that she could have displaced the Mirena with trying to remove the cup.    History obtained from the patient.    Problem List:  2021: mirena IUD 2021  2018-: Morbid obesity due to excess calories (H)  2017-: Bariatric surgery status  -: S/P  section  -: Polyhydramnios, third trimester, not applicable or    unspecified fetus  2015: Breech presentation, not applicable or unspecified fetus  2015: Non-specific low back pain  2015: Bariatric surgery status complicating pregnancy in second   trimester  2015: History of  delivery, currently pregnant in second   trimester  2015: Vaginal bleeding in pregnancy  2015: Encounter for supervision of other normal pregnancy  2013: Active labor  2013:  (normal spontaneous vaginal delivery)  2013: Berny Muñoz contractions  2013: Bariatric surgery status in pregnancy  2013: Supervision of normal first pregnancy  2013: Need for Tdap vaccination  2011: Mild major depression (H)  2011: Anxiety  2011: Pyelonephritis  Morbid obesity (H)  GERD without esophagitis      Past Medical History:   Diagnosis Date     Depressive disorder      GERD without esophagitis      Hypertension     prior to weight loss     Latent tuberculosis     neg chest x-ray. treated with INH for 9 moniths     Mild major depression (H)     celexa 100 mg      Morbid obesity (H)      Urinary incontinence        Social History     Tobacco Use     Smoking status: Former     Packs/day: 0.00     Years: 1.00     Pack years: 0.00     Types: Cigarettes, Dip, chew, snus or snuff     Start date: 1/10/2010     Quit date: 2013     Years since quitting: 10.0     Smokeless tobacco: Former     Quit date: 2013   Substance Use Topics     Alcohol use: Yes     Alcohol/week: 0.8 standard drinks       Review of Systems    Vitals:    23 1634   BP: 126/80   BP Location: Right arm   Patient Position: Sitting   Cuff Size: Adult Regular   Pulse: 84   Resp: 20   Temp: 98.8  F (37.1  C)   TempSrc: Oral   SpO2: 97%   Weight: 138.9 kg (306 lb 3.2 oz)       Physical Exam  Vitals and nursing note reviewed.   Constitutional:       General: She is not in acute distress.     Appearance: She is not toxic-appearing or diaphoretic.   HENT:      Head: Normocephalic and atraumatic.       Right Ear: External ear normal.      Left Ear: External ear normal.   Eyes:      Conjunctiva/sclera: Conjunctivae normal.   Pulmonary:      Effort: Pulmonary effort is normal. No respiratory distress.   Genitourinary:     General: Normal vulva.      Exam position: Lithotomy position.      Comments: Retained menstrual cup noted on exam.  It was successfully removed with forceps.  No complications.  Malodorous brown watery vaginal discharge noted in vaginal canal.  Mirena strings were noted in the os of the cervix.  Neurological:      Mental Status: She is alert.   Psychiatric:         Mood and Affect: Mood normal.         Behavior: Behavior normal.         Thought Content: Thought content normal.         Judgment: Judgment normal.       Results:  Results for orders placed or performed in visit on 01/17/23   Wet preparation     Status: Abnormal    Specimen: Vagina; Swab   Result Value Ref [facility]    Trichomonas Absent Absent    Yeast Absent Absent    Clue Cells Absent Absent    WBCs/high power field 2+ (A) None         At the end of the encounter, I discussed results, diagnosis, medications. Discussed red flags for immediate return to clinic/ER, as well as indications for follow up if no improvement. Patient understood and agreed to plan. Patient was stable for discharge.

## 2023-01-18 LAB
C TRACH DNA SPEC QL PROBE+SIG AMP: NEGATIVE
HCV AB SERPL QL IA: NONREACTIVE
HIV 1+2 AB+HIV1 P24 AG SERPL QL IA: NONREACTIVE
N GONORRHOEA DNA SPEC QL NAA+PROBE: NEGATIVE
T PALLIDUM AB SER QL: NONREACTIVE

## 2023-02-21 ENCOUNTER — TELEPHONE (OUTPATIENT)
Dept: DERMATOLOGY | Facility: CLINIC | Age: 38
End: 2023-02-21
Payer: COMMERCIAL

## 2023-02-21 ENCOUNTER — MYC MEDICAL ADVICE (OUTPATIENT)
Dept: DERMATOLOGY | Facility: CLINIC | Age: 38
End: 2023-02-21
Payer: COMMERCIAL

## 2023-02-21 NOTE — TELEPHONE ENCOUNTER
Patient has an appointment scheduled for 7/10/2023 with Dr. Arcos.    Dr. Arcos is closing clinic on 7/10/2023 and patient will need to be rescheduled to another day &/or provider.    Left message for patient to return call to the clinic to reschedule.  Will also send patient a [employer] message.

## 2023-05-08 NOTE — MR AVS SNAPSHOT
After Visit Summary   5/4/2018    Malika So    MRN: [id number]           Patient Information     Date Of Birth          1985        Visit Information        Provider Department      5/4/2018 10:20 AM Benita Kennedy MD [facility]        Today's Diagnoses     Preop general physical exam    -  1    Mild major depression (H)        Anxiety        Bariatric surgery status          Care Instructions      Before Your Surgery      Call your surgeon if there is any change in your health. This includes signs of a cold or flu (such as a sore throat, runny nose, cough, rash or fever).    Do not smoke, drink alcohol or take over the counter medicine (unless your surgeon or primary care doctor tells you to) for the 24 hours before and after surgery.    If you take prescribed drugs: Follow your doctor s orders about which medicines to take and which to stop until after surgery.    Eating and drinking prior to surgery: follow the instructions from your surgeon    Take a shower or bath the night before surgery. Use the soap your surgeon gave you to gently clean your skin. If you do not have soap from your surgeon, use your regular soap. Do not shave or scrub the surgery site.  Wear clean pajamas and have clean sheets on your bed.     [facility]    If you have any questions regarding to your visit please contact your care team:       Team Red:   Clinic Hours Telephone Number   Dr. Josefina Steel, NP   7am-7pm  Monday - Thursday   7am-5pm  Fridays  [phone]  (Appointment scheduling available 24/7)    Questions about your recent visit?   Team Line  [phone]   Urgent Care - [place] and [place] - 11am-9pm Monday-Friday Saturday-Sunday- 9am-5pm   [place] - 5pm-9pm Monday-Friday Saturday-Sunday- 9am-5pm  [phone] - Diana Dominguez  [phone] - Greg       What options do I have for a visit  Patient no showed appointment on 5/8/23 with KALIN Jenkins. This is patient's 1st no show.     Please send letter. Thank you!   other than an office visit? We offer electronic visits (e-visits) and telephone visits, when medically appropriate.  Please check with your medical insurance to see if these types of visits are covered, as you will be responsible for any charges that are not paid by your insurance.      You can use MysteryD (secure electronic communication) to access to your chart, send your primary care provider a message, or make an appointment. Ask a team member how to get started.     For a price quote for your services, please call our Consumer Price Line at [phone] or our Imaging Cost estimation line at [phone] (for imaging tests).      Discharged by: Cydney.             Follow-ups after your visit        Your next 10 appointments already scheduled     May 09, 2018   Procedure with Celeste Mai MD   [facility], [place], Same Day Surgery (--)    [address] [facility] [address] [phone]            May 22, 2018  9:45 AM CDT   Return Visit with Celeste Mai MD   [facility] ([facility])    [facility] [address]  [address] [facility] [address] [phone]              Who to contact     If you have questions or need follow up information about today's clinic visit or your schedule please contact [facility] directly at [phone].  Normal or non-critical lab and imaging results will be communicated to you by ConnectSofthart, letter or phone within 4 business days after the clinic has received the results. If you do not hear from us within 7 days, please contact the clinic through ConnectSofthart or phone. If you have a critical or abnormal lab result, we will notify you by phone as soon as possible.  Submit refill requests through MysteryD or call your pharmacy and they will forward the refill request to us. Please allow 3 business days for your refill to be completed.          Additional Information About Your Visit       "  MyChart Information     [employer] gives you secure access to your electronic health record. If you see a primary care provider, you can also send messages to your care team and make appointments. If you have questions, please call your primary care clinic.  If you do not have a primary care provider, please call [phone] and they will assist you.        Care EveryWhere ID     This is your Care EveryWhere ID. This could be used by other organizations to access your [place] medical records  [id number]        Your Vitals Were     Pulse Temperature Respirations Height Pulse Oximetry BMI (Body Mass Index)    73 96.9  F (36.1  C) (Oral) 18 5' 10\" (1.778 m) 98% 32.94 kg/m2       Blood Pressure from Last 3 Encounters:   05/04/18 116/78   04/26/18 125/77   04/18/18 123/80    Weight from Last 3 Encounters:   05/04/18 229 lb 9.6 oz (104.1 kg)   04/26/18 234 lb 3.2 oz (106.2 kg)   04/18/18 236 lb (107 kg)              We Performed the Following     Beta HCG Qual, Urine - [facility] and [place] ([id number])     HCG Qual, Urine - [facility],  [place]  ([id number])     Hemoglobin        Primary Care Provider Office Phone # Fax #    Josefina Welch [phone]       [address] [facility] [address]        Equal Access to Services     [facility]: Hadii aad ku hadasho Soomaali, waaxda luqadaha, qaybta kaalmada adesulaimanyada, brigitte morton . So [facility] [phone].    ATENCIÓN: Si habla español, tiene a rowe disposición servicios gratuitos de asistencia lingüística. Llame al [phone].    We comply with applicable federal civil rights laws and [place] laws. We do not discriminate on the basis of race, color, national origin, age, disability, sex, sexual orientation, or gender identity.            Thank you!     Thank you for choosing [facility]  for your care. Our goal is always to provide you with excellent care. Hearing back from our patients is one way we can " continue to improve our services. Please take a few minutes to complete the written survey that you may receive in the mail after your visit with us. Thank you!             Your Updated Medication List - Protect others around you: Learn how to safely use, store and throw away your medicines at www.disposemymeds.org.          This list is accurate as of 5/4/18 10:50 AM.  Always use your most recent med list.                   Brand Name Dispense Instructions for use Diagnosis    levonorgestrel 20 MCG/24HR IUD    MIRENA     1 each by Intrauterine route once        phentermine 15 MG capsule     30 capsule    Take 1 capsule (15 mg) by mouth every morning    Class 2 obesity with body mass index (BMI) of 36.0 to 36.9 in adult, unspecified obesity type, unspecified whether serious comorbidity present       ranitidine 150 MG tablet    ZANTAC     Take 1 tablet (150 mg) by mouth At Bedtime        sertraline 100 MG tablet    ZOLOFT    180 tablet    Take 2 tablets (200 mg) by mouth daily    Anxiety       topiramate 25 MG tablet    TOPAMAX    90 tablet    25mg at bedtime for week 1, 50mg at bedtime for week 2 and thereafter    Class 2 obesity with body mass index (BMI) of 36.0 to 36.9 in adult, unspecified obesity type, unspecified whether serious comorbidity present

## 2023-07-13 NOTE — TELEPHONE ENCOUNTER
Patient called regarding status of refill request. Patient states that she is still looking for a new psychiatrist as her most recent retired.     Patient advised that refills were sent to pharmacy for a one month supply. Writer assisted with scheduling virtual appointment with Selina Lancaster on 9/30 per message on 9/23.    Patient states that she also has a knee that is bothering her and will discuss this during appointment if time allows.    No further questions or concerns at this time.    Kelli Ahuja RN  [facility]     
Patient is calling requesting a refill of the prescriptions below.     Patient stated that she usually gets her prescription from her psychiatrist but her psychiatrist is recently retired and will not be able to refill her prescriptions.     Patient stated that she had called over to the psychiatrist's office to see if they are able to jimenez her a belen refill until she is able to follow up with another psychiatrist and they denied her request.     Patient is reaching out to PCP to refill the prescriptions below until she is able to establish with another psychiatrist.     Patient stated that she has ran out of the prescriptions below since Monday 9/19/22.    Patient states that she is taking 2 mg of clonazepam twice a day.     Sertraline is 200 mg daily.     Routing to provider to review and advise.     Deborah Jose RN, BSN  [facility]      
Will do one refill for a month to get time make appointment with psychiatry   Need a visit OK to do virtually in the meantime   
PAST SURGICAL HISTORY:  H/O Achilles tendon repair     History of appendectomy     History of tonsillectomy     S/P CABG x 4     S/P hemorrhoidectomy

## 2023-10-16 NOTE — IP AVS SNAPSHOT
MRN:[id number]                      After Visit Summary   5/9/2018    Malika So    MRN: [id number]           Thank you!     Thank you for choosing [facility] for your care. Our goal is always to provide you with excellent care. Hearing back from our patients is one way we can continue to improve our services. Please take a few minutes to complete the written survey that you may receive in the mail after you visit with us. Thank you!        Patient Information     Date Of Birth          1985        About your hospital stay     You were admitted on:  May 9, 2018 You last received care in the:   PACU    You were discharged on:  May 9, 2018       Who to Call     For medical emergencies, please call 911.  For non-urgent questions about your medical care, please call your primary care provider or clinic, [phone]  For questions related to your surgery, please call your surgery clinic        Attending Provider     Provider Celeste Butler MD OB/Gyn       Primary Care Provider Office Phone # Fax #    Josefina Welch [phone]      After Care Instructions     Discharge Instructions       Pelvic Rest. No tampons, douching or intercourse for  2  weeks.            Discharge Instructions       Please follow up as scheduled with Dr. Mai on 5/22/18            Discharge Instructions       Schedule a pelvic US in 2 weeks prior to your appointment with Dr. Mai on 5/22.  Resume pre procedure diet            Dressing       Keep dressing clean and dry, change as instructed by Provider or RN            Ice to affected area       PRN as tolerated            No alcohol       NO ALCOHOL for 24 hours post procedure            No driving or operating machinery       No driving or operating machinery until day after procedure            No lifting       No lifting over 15 pounds and no strenuous physical activity.  For 6 weeks            Shower         H&P reviewed. After examining the patient I find no changes in the patients condition since the H&P had been written.     Vitals:    10/16/23 0925   BP: 99/52   Pulse: 73   Resp: 16   Temp: 98.5 °F (36.9 °C)   SpO2: 98% Shower on Post-op day  1. Pat dry incision afterwards and keep clean and dry.                  Your next 10 appointments already scheduled     May 22, 2018  9:45 AM CDT   Return Visit with Celeste Mai MD   [facility] ([facility])    [facility] [address]  [address]  [address]  [facility] [id number] [phone]              Future tests that were ordered for you     US Pelvic Complete with Transvaginal                 Further instructions from your care team       [facility] [place] [facility]   Adult Discharge Orders & Instructions     For 24 hours after surgery    1. Get plenty of rest.  A responsible adult must stay with you for at least 24 hours after you leave the hospital.   2. Do not drive or use heavy equipment.  If you have weakness or tingling, don't drive or use heavy equipment until this feeling goes away.  3. Do not drink alcohol.  4. Avoid strenuous or risky activities.  Ask for help when climbing stairs.   5. You may feel lightheaded.  IF so, sit for a few minutes before standing.  Have someone help you get up.   6. If you have nausea (feel sick to your stomach): Drink only clear liquids such as apple juice, ginger ale, broth or 7-Up.  Rest may also help.  Be sure to drink enough fluids.  Move to a regular diet as you feel able.  7. You may have a slight fever. Call the doctor if your fever is over 100 F (37.7 C) (taken under the tongue) or lasts longer than 24 hours.  8. You may have a dry mouth, a sore throat, muscle aches or trouble sleeping.  These should go away after 24 hours.  9. Do not make important or legal decisions.   Call your doctor for any of the followin.  Signs of infection (fever, growing tenderness at the surgery site, a large amount of drainage or bleeding, severe pain, foul-smelling drainage, redness, swelling).    2. It has been over 8 to 10 hours since surgery and you are still not able to urinate (pass  "water).    3.  Headache for over 24 hours.    4.  Numbness, tingling or weakness the day after surgery (if you had spinal anesthesia).  To contact a doctor, call ________________________________________ Discharge Instructions:   Following a Laparoscopy    Comfort:    The amount of discomfort you can expect is very unpredictable.     If you have pain that cannot be controlled with non aspirin medication or with the prescription medication you may have received, you should notify your physician.     You May Experience:    Abdominal tenderness; abdominal cramps (like menstrual cramps).    Low back ache or discomfort radiating to your shoulders, chest, back or neck. This is a result of the gas used to inflate your abdomen during surgery. This gas is absorbed in 24 to 36 hours. The \"knee chest\" position will help relieve this discomfort.    Sore throat for a day or two resulting from the anesthesia tube used during surgery. You may use throat lozenges to help relieve this discomfort.    Black and blue marks on your abdomen.    Drainage:    You may expect a small amount of drainage from the incision on your abdomen and you may change the bandage when necessary.    You may also have a small amount of vaginal drainage for 3 to 4 days; this is normal and no cause for concern. If excessive bleeding occurs, notify your physician.    Do not douche, and use a pad rather than tampons. Do not resume intercourse for at least one week or until bleeding has ceased.    Home Activity:    The day of surgery spend a quiet day at home.    Increase activity as tolerated.    You may bathe or shower, do not soak in bath tub or scrub incisions.    You have no restrictions on your diet. Following surgery, drink plenty of fluids and eat a light meal.    The anesthesia may produce some nausea. If you feel nauseated, stay in bed, keep your head down and try drinking fluids such as Seven-Up, tea or soup.    Notify Physician at Once IF:    You have " "a fever over 100 degrees. A low grade fever (under 100 degrees) is usual after surgery.    You have severe pain.    You have a large amount of bleeding or drainage.    Rev. 4/2014    Additional Information     If you use hormonal birth control (such as the pill, patch, ring or implants): You'll need a second form of birth control for 7 days (condoms, a diaphragm or contraceptive foam). While in the hospital, you received a medicine called Bridion. Your normal birth control will not work as well for a week after taking this medicine.          Pending Results     Date and Time Order Name Status Description    5/9/2018 0859 Surgical pathology exam In process             Admission Information     Date & Time Provider Department Dept. Phone    5/9/2018 Celeste Mai MD [facility] PACU [phone]      Your Vitals Were     Blood Pressure Pulse Temperature Respirations Height Weight    111/68 64 98.2  F (36.8  C) (Oral) 12 1.778 m (5' 10\") 102.3 kg (225 lb 8.5 oz)    Pulse Oximetry BMI (Body Mass Index)                99% 32.36 kg/m2          MOVLhart Information     [employer] gives you secure access to your electronic health record. If you see a primary care provider, you can also send messages to your care team and make appointments. If you have questions, please call your primary care clinic.  If you do not have a primary care provider, please call [phone] and they will assist you.        Care EveryWhere ID     This is your Care EveryWhere ID. This could be used by other organizations to access your [place] medical records  [id number]        Equal Access to Services     [facility] : Hadii aad ku hadasho Sosylvieali, waaxda luqadaha, qaybta kaalmada adeegyaalton, brigitte morton . So [facility] [phone].    ATENCIÓN: Si habla español, tiene a rowe disposición servicios gratuitos de asistencia lingüística. Llame al [phone].    We comply with applicable federal civil rights laws and [place] " laws. We do not discriminate on the basis of race, color, national origin, age, disability, sex, sexual orientation, or gender identity.               Review of your medicines      START taking        Dose / Directions    ibuprofen 600 MG tablet   Commonly known as:  ADVIL/MOTRIN   Used for:  Status post bilateral salpingectomy        Dose:  600 mg   Take 1 tablet (600 mg) by mouth every 6 hours as needed for pain (mild)   Quantity:  30 tablet   Refills:  0       oxyCODONE-acetaminophen 5-325 MG per tablet   Commonly known as:  PERCOCET   Used for:  Status post bilateral salpingectomy        Dose:  1-2 tablet   Take 1-2 tablets by mouth every 4 hours as needed for pain (moderate to severe)   Quantity:  10 tablet   Refills:  0       senna-docusate 8.6-50 MG per tablet   Commonly known as:  SENOKOT-S;PERICOLACE   Used for:  Status post bilateral salpingectomy        Dose:  1-2 tablet   Take 1-2 tablets by mouth 2 times daily Take while on oral narcotics to prevent or treat constipation.   Quantity:  30 tablet   Refills:  0         CONTINUE these medicines which have NOT CHANGED        Dose / Directions    levonorgestrel 20 MCG/24HR IUD   Commonly known as:  MIRENA        Dose:  1 each   1 each by Intrauterine route once   Refills:  0       phentermine 15 MG capsule   Used for:  Class 2 obesity with body mass index (BMI) of 36.0 to 36.9 in adult, unspecified obesity type, unspecified whether serious comorbidity present        Dose:  15 mg   Take 1 capsule (15 mg) by mouth every morning   Quantity:  30 capsule   Refills:  3       ranitidine 150 MG tablet   Commonly known as:  ZANTAC        Dose:  150 mg   Take 1 tablet (150 mg) by mouth At Bedtime   Refills:  0       sertraline 100 MG tablet   Commonly known as:  ZOLOFT   Used for:  Anxiety        Dose:  200 mg   Take 2 tablets (200 mg) by mouth daily   Quantity:  180 tablet   Refills:  1       topiramate 25 MG tablet   Commonly known as:  TOPAMAX   Used for:  Class 2  obesity with body mass index (BMI) of 36.0 to 36.9 in adult, unspecified obesity type, unspecified whether serious comorbidity present        25mg at bedtime for week 1, 50mg at bedtime for week 2 and thereafter   Quantity:  90 tablet   Refills:  3            Where to get your medicines      These medications were sent to [facility] [place], [place] - [address]  [address], [facility] [address]     Phone:  [phone]     ibuprofen 600 MG tablet    senna-docusate 8.6-50 MG per tablet         Some of these will need a paper prescription and others can be bought over the counter. Ask your nurse if you have questions.     Bring a paper prescription for each of these medications     oxyCODONE-acetaminophen 5-325 MG per tablet                Protect others around you: Learn how to safely use, store and throw away your medicines at www.disposemymeds.org.        Information about OPIOIDS     PRESCRIPTION OPIOIDS: WHAT YOU NEED TO KNOW   You have a prescription for an opioid (narcotic) pain medicine. Opioids can cause addiction. If you have a history of chemical dependency of any type, you are at a higher risk of becoming addicted to opioids. Only take this medicine after all other options have been tried. Take it for as short a time and as few doses as possible.     Do not:    Drive. If you drive while taking these medicines, you could be arrested for driving under the influence (DUI).    Operate heavy machinery    Do any other dangerous activities while taking these medicines.     Drink any alcohol while taking these medicines.      Take with any other medicines that contain acetaminophen. Read all labels carefully. Look for the word  acetaminophen  or  Tylenol.  Ask your pharmacist if you have questions or are unsure.    Store your pills in a secure place, locked if possible. We will not replace any lost or stolen medicine. If you don t finish your medicine, please throw away (dispose)  as directed by your pharmacist. The [place] Pollution Control Agency has more information about safe disposal: https://www.pca.[facility].[place].us/living-green/managing-unwanted-medications    All opioids tend to cause constipation. Drink plenty of water and eat foods that have a lot of fiber, such as fruits, vegetables, prune juice, apple juice and high-fiber cereal. Take a laxative (Miralax, milk of magnesia, Colace, Senna) if you don t move your bowels at least every other day.              Medication List: This is a list of all your medications and when to take them. Check marks below indicate your daily home schedule. Keep this list as a reference.      Medications           Morning Afternoon Evening Bedtime As Needed    ibuprofen 600 MG tablet   Commonly known as:  ADVIL/MOTRIN   Take 1 tablet (600 mg) by mouth every 6 hours as needed for pain (mild)                                levonorgestrel 20 MCG/24HR IUD   Commonly known as:  MIRENA   1 each by Intrauterine route once                                oxyCODONE-acetaminophen 5-325 MG per tablet   Commonly known as:  PERCOCET   Take 1-2 tablets by mouth every 4 hours as needed for pain (moderate to severe)                                phentermine 15 MG capsule   Take 1 capsule (15 mg) by mouth every morning                                ranitidine 150 MG tablet   Commonly known as:  ZANTAC   Take 1 tablet (150 mg) by mouth At Bedtime                                senna-docusate 8.6-50 MG per tablet   Commonly known as:  SENOKOT-S;PERICOLACE   Take 1-2 tablets by mouth 2 times daily Take while on oral narcotics to prevent or treat constipation.                                sertraline 100 MG tablet   Commonly known as:  ZOLOFT   Take 2 tablets (200 mg) by mouth daily                                topiramate 25 MG tablet   Commonly known as:  TOPAMAX   25mg at bedtime for week 1, 50mg at bedtime for week 2 and thereafter

## 2023-10-23 ASSESSMENT — ENCOUNTER SYMPTOMS
BREAST MASS: 0
MYALGIAS: 1
WEAKNESS: 0
CONSTIPATION: 0
PARESTHESIAS: 0
DIZZINESS: 0
DYSURIA: 0
SHORTNESS OF BREATH: 0
DIARRHEA: 0
EYE PAIN: 0
PALPITATIONS: 0
CHILLS: 0
COUGH: 0
NAUSEA: 0
SORE THROAT: 0
HEMATOCHEZIA: 0
NERVOUS/ANXIOUS: 0
FEVER: 0
HEADACHES: 0
ABDOMINAL PAIN: 0
HEARTBURN: 0
HEMATURIA: 0
ARTHRALGIAS: 1
FREQUENCY: 0
JOINT SWELLING: 1

## 2023-10-23 ASSESSMENT — PATIENT HEALTH QUESTIONNAIRE - PHQ9
SUM OF ALL RESPONSES TO PHQ QUESTIONS 1-9: 10
10. IF YOU CHECKED OFF ANY PROBLEMS, HOW DIFFICULT HAVE THESE PROBLEMS MADE IT FOR YOU TO DO YOUR WORK, TAKE CARE OF THINGS AT HOME, OR GET ALONG WITH OTHER PEOPLE: SOMEWHAT DIFFICULT
SUM OF ALL RESPONSES TO PHQ QUESTIONS 1-9: 10

## 2023-10-24 ENCOUNTER — OFFICE VISIT (OUTPATIENT)
Dept: FAMILY MEDICINE | Facility: CLINIC | Age: 38
End: 2023-10-24
Payer: COMMERCIAL

## 2023-10-24 ENCOUNTER — MEDICAL CORRESPONDENCE (OUTPATIENT)
Dept: FAMILY MEDICINE | Facility: CLINIC | Age: 38
End: 2023-10-24

## 2023-10-24 ENCOUNTER — ANCILLARY PROCEDURE (OUTPATIENT)
Dept: GENERAL RADIOLOGY | Facility: CLINIC | Age: 38
End: 2023-10-24
Attending: NURSE PRACTITIONER
Payer: COMMERCIAL

## 2023-10-24 VITALS
HEART RATE: 85 BPM | HEIGHT: 66 IN | OXYGEN SATURATION: 100 % | WEIGHT: 293 LBS | RESPIRATION RATE: 18 BRPM | TEMPERATURE: 98.6 F | SYSTOLIC BLOOD PRESSURE: 127 MMHG | DIASTOLIC BLOOD PRESSURE: 83 MMHG | BODY MASS INDEX: 47.09 KG/M2

## 2023-10-24 DIAGNOSIS — Z23 HIGH PRIORITY FOR 2019-NCOV VACCINE: ICD-10-CM

## 2023-10-24 DIAGNOSIS — M79.641 PAIN OF RIGHT HAND: ICD-10-CM

## 2023-10-24 DIAGNOSIS — F33.1 MODERATE EPISODE OF RECURRENT MAJOR DEPRESSIVE DISORDER (H): ICD-10-CM

## 2023-10-24 DIAGNOSIS — M65.4 DE QUERVAIN'S TENOSYNOVITIS, RIGHT: ICD-10-CM

## 2023-10-24 DIAGNOSIS — Z13.1 SCREENING FOR DIABETES MELLITUS (DM): ICD-10-CM

## 2023-10-24 DIAGNOSIS — Z13.6 CARDIOVASCULAR SCREENING; LDL GOAL LESS THAN 160: ICD-10-CM

## 2023-10-24 DIAGNOSIS — M25.562 LEFT KNEE PAIN, UNSPECIFIED CHRONICITY: ICD-10-CM

## 2023-10-24 DIAGNOSIS — E66.01 MORBID OBESITY (H): ICD-10-CM

## 2023-10-24 DIAGNOSIS — M79.671 RIGHT FOOT PAIN: ICD-10-CM

## 2023-10-24 DIAGNOSIS — Z00.00 ROUTINE GENERAL MEDICAL EXAMINATION AT A HEALTH CARE FACILITY: Primary | ICD-10-CM

## 2023-10-24 DIAGNOSIS — Z13.29 SCREENING FOR THYROID DISORDER: ICD-10-CM

## 2023-10-24 DIAGNOSIS — Z23 NEED FOR PROPHYLACTIC VACCINATION AND INOCULATION AGAINST INFLUENZA: ICD-10-CM

## 2023-10-24 DIAGNOSIS — Z13.0 SCREENING FOR DISORDER OF BLOOD AND BLOOD-FORMING ORGANS: ICD-10-CM

## 2023-10-24 PROCEDURE — 99395 PREV VISIT EST AGE 18-39: CPT | Mod: 25 | Performed by: NURSE PRACTITIONER

## 2023-10-24 PROCEDURE — 90480 ADMN SARSCOV2 VAC 1/ONLY CMP: CPT | Performed by: NURSE PRACTITIONER

## 2023-10-24 PROCEDURE — 99213 OFFICE O/P EST LOW 20 MIN: CPT | Mod: 25 | Performed by: NURSE PRACTITIONER

## 2023-10-24 PROCEDURE — 91320 SARSCV2 VAC 30MCG TRS-SUC IM: CPT | Performed by: NURSE PRACTITIONER

## 2023-10-24 PROCEDURE — 73130 X-RAY EXAM OF HAND: CPT | Mod: TC | Performed by: RADIOLOGY

## 2023-10-24 PROCEDURE — 90686 IIV4 VACC NO PRSV 0.5 ML IM: CPT | Performed by: NURSE PRACTITIONER

## 2023-10-24 PROCEDURE — 90471 IMMUNIZATION ADMIN: CPT | Performed by: NURSE PRACTITIONER

## 2023-10-24 PROCEDURE — 73562 X-RAY EXAM OF KNEE 3: CPT | Mod: TC | Performed by: RADIOLOGY

## 2023-10-24 RX ORDER — NAPROXEN 500 MG/1
500 TABLET ORAL 2 TIMES DAILY PRN
Qty: 60 TABLET | Refills: 1 | Status: SHIPPED | OUTPATIENT
Start: 2023-10-24

## 2023-10-24 ASSESSMENT — ENCOUNTER SYMPTOMS
SHORTNESS OF BREATH: 0
PALPITATIONS: 0
NAUSEA: 0
CHILLS: 0
HEADACHES: 0
DIZZINESS: 0
HEARTBURN: 0
EYE PAIN: 0
MYALGIAS: 1
PARESTHESIAS: 0
ARTHRALGIAS: 1
SORE THROAT: 0
CONSTIPATION: 0
COUGH: 0
BREAST MASS: 0
WEAKNESS: 0
FEVER: 0
FREQUENCY: 0
HEMATURIA: 0
ABDOMINAL PAIN: 0
JOINT SWELLING: 1
DIARRHEA: 0
DYSURIA: 0
NERVOUS/ANXIOUS: 0
HEMATOCHEZIA: 0

## 2023-10-24 ASSESSMENT — PAIN SCALES - GENERAL: PAINLEVEL: MODERATE PAIN (5)

## 2023-10-24 NOTE — PROGRESS NOTES
Prior to immunization administration, verified patients identity using patient s name and date of birth. Please see Immunization Activity for additional information.     Screening Questionnaire for Adult Immunization    Are you sick today?   No   Do you have allergies to medications, food, a vaccine component or latex?   No   Have you ever had a serious reaction after receiving a vaccination?   No   Do you have a long-term health problem with heart, lung, kidney, or metabolic disease (e.g., diabetes), asthma, a blood disorder, no spleen, complement component deficiency, a cochlear implant, or a spinal fluid leak?  Are you on long-term aspirin therapy?   No   Do you have cancer, leukemia, HIV/AIDS, or any other immune system problem?   No   Do you have a parent, brother, or sister with an immune system problem?   No   In the past 3 months, have you taken medications that affect  your immune system, such as prednisone, other steroids, or anticancer drugs; drugs for the treatment of rheumatoid arthritis, Crohn s disease, or psoriasis; or have you had radiation treatments?   No   Have you had a seizure, or a brain or other nervous system problem?   No   During the past year, have you received a transfusion of blood or blood    products, or been given immune (gamma) globulin or antiviral drug?   No   For women: Are you pregnant or is there a chance you could become       pregnant during the next month?   No   Have you received any vaccinations in the past 4 weeks?   No     Immunization questionnaire answers were all negative.      Patient instructed to remain in clinic for 15 minutes afterwards, and to report any adverse reactions.     Screening performed by Kathy Crockett MA on 10/24/2023 at 11:29 AM.

## 2023-10-24 NOTE — PROGRESS NOTES
SUBJECTIVE:   CC: Malika is an 37 year old who presents for preventive health visit.       10/24/2023    10:15 AM   Additional Questions   Roomed by Latesha Keyes   Accompanied by Self         10/24/2023    10:15 AM   Patient Reported Additional Medications   Patient reports taking the following new medications None       Healthy Habits:     Getting at least 3 servings of Calcium per day:  Yes    Bi-annual eye exam:  NO    Dental care twice a year:  Yes    Sleep apnea or symptoms of sleep apnea:  Daytime drowsiness and Excessive snoring    Diet:  Regular (no restrictions)    Frequency of exercise:  None    Taking medications regularly:  Yes    Medication side effects:  Not applicable    Additional concerns today:  No      Today's PHQ-9 Score:       10/23/2023    10:45 AM   PHQ-9 SCORE   PHQ-9 Total Score MyChart 10 (Moderate depression)   PHQ-9 Total Score 10       Social History     Tobacco Use    Smoking status: Former     Packs/day: 0.00     Years: 1.00     Additional pack years: 0.00     Total pack years: 0.00     Types: Cigarettes, Dip, chew, snus or snuff     Start date: 1/10/2010     Quit date: 1/1/2013     Years since quitting: 10.8    Smokeless tobacco: Former     Quit date: 1/1/2013   Substance Use Topics    Alcohol use: Yes     Alcohol/week: 0.8 standard drinks of alcohol             10/23/2023    10:48 AM   Alcohol Use   Prescreen: >3 drinks/day or >7 drinks/week? No     Reviewed orders with patient.  Reviewed health maintenance and updated orders accordingly - Yes  Lab work is in process  Labs reviewed in EPIC  BP Readings from Last 3 Encounters:   10/24/23 127/83   01/17/23 126/80   12/23/22 138/85    Wt Readings from Last 3 Encounters:   10/24/23 140.6 kg (310 lb)   01/17/23 138.9 kg (306 lb 3.2 oz)   12/23/22 142.2 kg (313 lb 9.6 oz)                  Patient Active Problem List   Diagnosis    Mild major depression (H)    Anxiety    Non-specific low back pain    Morbid obesity (H)    Bariatric  surgery status    GERD without esophagitis    mirena IUD 2021    De Quervain's tenosynovitis, right    Moderate episode of recurrent major depressive disorder (H)     Past Surgical History:   Procedure Laterality Date    C EACH ADD TOOTH EXTRACTION      multiple tooth extractions     SECTION N/A 2016    Procedure:  SECTION;  Surgeon: Kelli Osullivan MD;  Location:  L+D    CHOLECYSTECTOMY, LAPOROSCOPIC      LAP ADJUSTABLE GASTRIC BAND      [facility] lost over 150 lb     LAPAROSCOPIC SALPINGECTOMY Bilateral 2018    Procedure: LAPAROSCOPIC SALPINGECTOMY;  Bilateral Laparoscopic Salpingectomy ;  Surgeon: Celeste Mai MD;  Location:  OR    [facility]      TONSILLECTOMY         Social History     Tobacco Use    Smoking status: Former     Packs/day: 0.00     Years: 1.00     Additional pack years: 0.00     Total pack years: 0.00     Types: Cigarettes, Dip, chew, snus or snuff     Start date: 1/10/2010     Quit date: 2013     Years since quitting: 10.8    Smokeless tobacco: Former     Quit date: 2013   Substance Use Topics    Alcohol use: Yes     Alcohol/week: 0.8 standard drinks of alcohol     Family History   Problem Relation Age of Onset    C.A.D. Maternal Grandfather     Diabetes Maternal Grandfather     Hypertension Maternal Grandfather     Obesity Maternal Grandfather     Hyperlipidemia Maternal Grandfather     Coronary Artery Disease Maternal Grandfather     Colon Polyps Maternal Grandfather     Heart Failure Paternal Grandmother         CHF    Anemia Paternal Grandmother     Glaucoma Father         corneal transplant. congenital glaucoma    Obesity Maternal Grandmother     Other Cancer Maternal Grandmother         cirrosis of liver. non-drinker    Obesity Other         Had GI surgery    Hypertension Other         mother's side of family    Arthritis Other         both sides of family    Hypertension Other     Obesity Other          Current Outpatient  Medications   Medication Sig Dispense Refill    buPROPion (WELLBUTRIN SR) 100 MG 12 hr tablet       clonazePAM (KLONOPIN) 2 MG tablet Take 1 tablet (2 mg) by mouth 2 times daily as needed for anxiety - use sparingly 60 tablet 0    diclofenac (VOLTAREN) 1 % topical gel Apply 2 g topically 4 times daily 100 g 3    levonorgestrel (MIRENA) 20 MCG/24HR IUD 1 each by Intrauterine route once      levonorgestrel (MIRENA) 20 MCG/24HR IUD 1 each by Intrauterine route once      naproxen (NAPROSYN) 500 MG tablet Take 1 tablet (500 mg) by mouth 2 times daily as needed for moderate pain 60 tablet 1    sertraline (ZOLOFT) 100 MG tablet Take 2 tablets (200 mg) by mouth daily 60 tablet 1    traZODone (DESYREL) 100 MG tablet        Allergies   Allergen Reactions    Adhesive Tape      blisters    Chlorhexidine      Pruritic rash    Fenugreek [Trigonella Foenum-Graecum] Hives    Ragweeds Hives    Colophony  [Pinus Strobus] Blisters, Dermatitis, Hives, Itching and Rash       Breast Cancer Screening:NA    FHS-7:       Patient under 40 years of age: Routine Mammogram Screening not recommended.   Pertinent mammograms are reviewed under the imaging tab.    History of abnormal Pap smear: NO - age 30-65 PAP every 5 years with negative HPV co-testing recommended      Latest Ref Rng & Units 8/18/2021     1:38 PM 2/24/2016     6:03 PM 2/24/2016    12:00 AM   PAP / HPV   PAP  Negative for Intraepithelial Lesion or Malignancy (NILM)      PAP (Historical)    NIL    HPV 16 DNA Negative Negative  Negative     HPV 18 DNA Negative Negative  Negative     Other HR HPV Negative Negative  Negative       Reviewed and updated as needed this visit by clinical staff   Tobacco  Allergies  Meds              Reviewed and updated as needed this visit by Provider                 Past Medical History:   Diagnosis Date    Depressive disorder     GERD without esophagitis     Hypertension     prior to weight loss    Latent tuberculosis 2003    neg chest x-ray.  treated with INH for 9 moniths    Mild major depression (H24)     celexa 100 mg     Morbid obesity (H)     Urinary incontinence       Past Surgical History:   Procedure Laterality Date    C EACH ADD TOOTH EXTRACTION      multiple tooth extractions     SECTION N/A 2016    Procedure:  SECTION;  Surgeon: Kelli Osullivan MD;  Location: [facility]    CHOLECYSTECTOMY, LAPOROSCOPIC      LAP ADJUSTABLE GASTRIC BAND      [facility] lost over 150 lb     LAPAROSCOPIC SALPINGECTOMY Bilateral 2018    Procedure: LAPAROSCOPIC SALPINGECTOMY;  Bilateral Laparoscopic Salpingectomy ;  Surgeon: Celeste Mai MD;  Location:  OR    [facility]      TONSILLECTOMY         Review of Systems   Constitutional:  Negative for chills and fever.   HENT:  Negative for congestion, ear pain, hearing loss and sore throat.    Eyes:  Negative for pain and visual disturbance.   Respiratory:  Negative for cough and shortness of breath.    Cardiovascular:  Negative for chest pain, palpitations and peripheral edema.   Gastrointestinal:  Negative for abdominal pain, constipation, diarrhea, heartburn, hematochezia and nausea.   Breasts:  Positive for tenderness. Negative for breast mass and discharge.        Just more sensitive she has noted in the last 6 month  Not drinking more caffeine    Genitourinary:  Negative for dysuria, frequency, genital sores, hematuria, pelvic pain, urgency, vaginal bleeding and vaginal discharge.   Musculoskeletal:  Positive for arthralgias, joint swelling and myalgias.        Her left knee has been bothering her and will a snapping or popping sensation   Same knee is also grinding and popping   Not unstable and is just stretching and does not remember any injury   Had kneeling and painting in July     Right hand pain after struggling with dog who had captured something and she was trying to get away from him and got fingers caught in the collar   Now wrist and the fingers are painful  "  However pain in wrist is with activity but no injury to the wrist    Skin:  Negative for rash.   Neurological:  Negative for dizziness, weakness, headaches and paresthesias.   Psychiatric/Behavioral:  Negative for mood changes. The patient is not nervous/anxious.         Sees psychiatry about every 3 months      OBJECTIVE:   /83 (BP Location: Right arm, Patient Position: Sitting, Cuff Size: Adult Large)   Pulse 85   Temp 98.6  F (37  C) (Oral)   Resp 18   Ht 1.688 m (5' 6.46\")   Wt 140.6 kg (310 lb)   SpO2 100%   BMI 49.35 kg/m    Wt Readings from Last 4 Encounters:   10/24/23 140.6 kg (310 lb)   01/17/23 138.9 kg (306 lb 3.2 oz)   12/23/22 142.2 kg (313 lb 9.6 oz)   08/18/21 130.3 kg (287 lb 3.2 oz)       Physical Exam  GENERAL: healthy, alert, and no distress  EYES: Eyes grossly normal to inspection and conjunctivae and sclerae normal  HENT: ear canals and TM's normal, nose and mouth without ulcers or lesions  NECK: no adenopathy, no asymmetry, masses, or scars and thyroid normal to palpation  RESP: lungs clear to auscultation - no rales, rhonchi or wheezes  BREAST: normal without masses, tenderness or nipple discharge and no palpable axillary masses or adenopathy  CV: regular rates and rhythm, no murmur, click or rub, peripheral pulses strong, and no peripheral edema  ABDOMEN: soft, nontender, no hepatosplenomegaly, no masses and bowel sounds normal   (female): normal female external genitalia, normal urethral meatus, vaginal mucosa pink, moist, well rugated, and normal cervix/adnexa/uterus without masses or discharge  MS: extremities normal- no gross deformities noted  extremities normal- no gross deformities noted, gait normal, and normal muscle tone  Wrist Exam: WRIST:  Inspection: no swelling  Palpation: Tender: distal radius  Non-tender: distal ulna  Range of Motion: normal, flexion:  full, painful, extension:  full, painful  Strength: no deficits  Special tests: positive Finkelstein's.  "   Knee Exam: Inspection: AP/lateral alignment normal  Non-tender: lateral patellar facet, medial patellar facet, medial joint line  Active Range of Motion: full flexion, pain with flexion, full extension, pain with extension  Strength: quad  5/5  SKIN: no suspicious lesions or rashes  NEURO: Normal strength and tone, mentation intact and speech normal  PSYCH: mentation appears normal, affect flat, fatigued, judgement and insight intact, and appearance well groomed  LYMPH: no cervical, supraclavicular, axillary, or inguinal adenopathy    Diagnostic Test Results:  Labs reviewed in Epic  Results for orders placed or performed in visit on 10/24/23   XR Hand Right G/E 3 Views     Status: None    Narrative    XR HAND RIGHT G/E 3 VIEWS   10/24/2023 11:21 AM     HISTORY: Pain of right hand  COMPARISON: None.       Impression    IMPRESSION: Normal joint spaces and alignment. No fracture.    KOREY LIM MD         SYSTEM ID:  [id number]   Results for orders placed or performed in visit on 10/24/23   XR Knee Left 3 Views     Status: None    Narrative    XR KNEE LEFT 3 VIEWS   10/24/2023 11:14 AM     HISTORY: Left knee pain, unspecified chronicity  COMPARISON: None.       Impression    IMPRESSION: No fracture. Mild patellofemoral and medial compartment  degenerative arthritis. Minimal effusion.    KOREY LIM MD         SYSTEM ID:  [id number]       ASSESSMENT/PLAN:   Malika was seen today for physical, imm/inj and imm/inj.    Diagnoses and all orders for this visit:    Routine general medical examination at a health care facility  -     PRIMARY CARE FOLLOW-UP SCHEDULING; Future  -     REVIEW OF HEALTH MAINTENANCE PROTOCOL ORDERS    CARDIOVASCULAR SCREENING; LDL GOAL LESS THAN 160  -     Lipid panel reflex to direct LDL Fasting; Future    Screening for thyroid disorder  -     TSH with free T4 reflex; Future    Screening for disorder of blood and blood-forming organs  -     CBC with platelets; Future    Screening for  diabetes mellitus (DM)  -     Comprehensive metabolic panel; Future    Left knee pain, unspecified chronicity  -     XR Knee Left 3 Views; Future  -     Orthopedic  Referral; Future  Will follow up and/or notify patient of  results via My Chart to determine further need for followup    Pain of right hand  -     XR Hand Right G/E 3 Views; Future  Will rule out previous injury or fracture     De Quervain's tenosynovitis, right  -     Wrist/Arm Supplies Order Wrist Brace; Right; with thumb spica  Will try splint   Try Voltaren gel for wrist pain or naprosyn as needed   rest the injured area as much as practical, apply ice packs, splint dispensed and applied    Right foot pain  -     naproxen (NAPROSYN) 500 MG tablet; Take 1 tablet (500 mg) by mouth 2 times daily as needed for moderate pain    Need for prophylactic vaccination and inoculation against influenza  -     INFLUENZA VACCINE IM > 6 MONTHS VALENT IIV4 (AFLURIA/FLUZONE)    High priority for 2019-nCoV vaccine  -     COVID-19 12+ (2023-24) (PFIZER)    Moderate episode of recurrent major depressive disorder (H)  FOLLOW UP WITH SPECIALIST :Psychiatry    Morbid obesity (H)  Healthy diet and exercise reviewed.  Limit pop and juice intake.  Risks of obesity discussed.  Encourage exercise.  Limit TV/Computer/game time to one hour a day.    PLAN:   FURTHER TESTING:       - xray knee and hand   FUTURE LABS:       - Schedule a fasting blood draw   Work on weight loss  Regular exercise  Follow up office visit in one year for annual health maintenance exam, sooner PRN.  Will follow up and/or notify patient of  results via My Chart to determine further need for followup   Patient needs to follow up in if no improvement,or sooner if worsening of symptoms or other symptoms develop.        Patient has been advised of split billing requirements and indicates understanding: Yes      COUNSELING:  Reviewed preventive health counseling, as reflected in patient  "instructions  Special attention given to:        Regular exercise       Healthy diet/nutrition       Vision screening       Contraception       Family planning       Osteoporosis prevention/bone health      BMI:   Estimated body mass index is 49.35 kg/m  as calculated from the following:    Height as of this encounter: 1.688 m (5' 6.46\").    Weight as of this encounter: 140.6 kg (310 lb).   Weight management plan: Discussed healthy diet and exercise guidelines      She reports that she quit smoking about 10 years ago. Her smoking use included cigarettes and dip, chew, snus or snuff. She started smoking about 13 years ago. She quit smokeless tobacco use about 10 years ago.          MATT Og CNP  [facility]  "

## 2023-10-24 NOTE — PATIENT INSTRUCTIONS
PLAN:   1.   Symptomatic therapy suggested: Increase calcium to 1000mg and 1000 iu Vit D  Try Voltaren gel for wrist pain or naprosyn as needed   rest the injured area as much as practical, apply ice packs, splint dispensed and applied    2.  Orders Placed This Encounter   Medications    naproxen (NAPROSYN) 500 MG tablet     Sig: Take 1 tablet (500 mg) by mouth 2 times daily as needed for moderate pain     Dispense:  60 tablet     Refill:  1     Orders Placed This Encounter   Procedures    REVIEW OF HEALTH MAINTENANCE PROTOCOL ORDERS    XR Knee Left 3 Views    XR Hand Right G/E 3 Views    INFLUENZA VACCINE IM > 6 MONTHS VALENT IIV4 (AFLURIA/FLUZONE)    COVID-19 12+ (2023-24) (PFIZER)    Lipid panel reflex to direct LDL Fasting    CBC with platelets    Comprehensive metabolic panel    TSH with free T4 reflex    Wrist/Arm Supplies Order Wrist Brace; Right; with thumb spica       3.  FURTHER TESTING:       - xray knee and hand   FUTURE LABS:       - Schedule a fasting blood draw   Work on weight loss  Regular exercise  Follow up office visit in one year for annual health maintenance exam, sooner PRN.  Will follow up and/or notify patient of  results via My Chart to determine further need for followup   Patient needs to follow up in if no improvement,or sooner if worsening of symptoms or other symptoms develop.            Preventive Health Recommendations  Female Ages 26 - 39  Yearly exam:   See your health care provider every year in order to  Review health changes.   Discuss preventive care.    Review your medicines if you your doctor has prescribed any.    Until age 30: Get a Pap test every three years (more often if you have had an abnormal result).    After age 30: Talk to your doctor about whether you should have a Pap test every 3 years or have a Pap test with HPV screening every 5 years.   You do not need a Pap test if your uterus was removed (hysterectomy) and you have not had cancer.  You should be tested each  year for STDs (sexually transmitted diseases), if you're at risk.   Talk to your provider about how often to have your cholesterol checked.  If you are at risk for diabetes, you should have a diabetes test (fasting glucose).  Shots: Get a flu shot each year. Get a tetanus shot every 10 years.   Nutrition:   Eat at least 5 servings of fruits and vegetables each day.  Eat whole-grain bread, whole-wheat pasta and brown rice instead of white grains and rice.  Get adequate Calcium and Vitamin D.     Lifestyle  Exercise at least 150 minutes a week (30 minutes a day, 5 days of the week). This will help you control your weight and prevent disease.  Limit alcohol to one drink per day.  No smoking.   Wear sunscreen to prevent skin cancer.  See your dentist every six months for an exam and cleaning.

## 2023-10-25 NOTE — RESULT ENCOUNTER NOTE
Pranay So,    Attached are your test results.  Your knee shows some moderate degenerative changes  Am going to refer you to orthopedics   Please be aware that coverage of these services is subject to the terms and limitations of your health insurance plan.  Call member services at your health plan with any benefit or coverage questions.  [facility]  will call you to coordinate your care as prescribed by your provider. A representative will call you within 2 business days to help you schedule your appointment, or you may contact the  Representative at: [phone].       Please contact us if you have any questions.    Selina Lancaster, CNP

## 2023-11-01 PROBLEM — F33.1 MODERATE EPISODE OF RECURRENT MAJOR DEPRESSIVE DISORDER (H): Status: ACTIVE | Noted: 2023-11-01

## 2023-11-06 ENCOUNTER — TRANSFERRED RECORDS (OUTPATIENT)
Dept: HEALTH INFORMATION MANAGEMENT | Facility: CLINIC | Age: 38
End: 2023-11-06
Payer: COMMERCIAL

## 2023-11-16 ENCOUNTER — TRANSFERRED RECORDS (OUTPATIENT)
Dept: HEALTH INFORMATION MANAGEMENT | Facility: CLINIC | Age: 38
End: 2023-11-16
Payer: COMMERCIAL

## 2023-12-28 ENCOUNTER — TRANSFERRED RECORDS (OUTPATIENT)
Dept: HEALTH INFORMATION MANAGEMENT | Facility: CLINIC | Age: 38
End: 2023-12-28
Payer: COMMERCIAL

## 2024-01-16 ENCOUNTER — TRANSFERRED RECORDS (OUTPATIENT)
Dept: HEALTH INFORMATION MANAGEMENT | Facility: CLINIC | Age: 39
End: 2024-01-16
Payer: COMMERCIAL

## 2024-11-04 SDOH — HEALTH STABILITY: PHYSICAL HEALTH: ON AVERAGE, HOW MANY MINUTES DO YOU ENGAGE IN EXERCISE AT THIS LEVEL?: 0 MIN

## 2024-11-04 SDOH — HEALTH STABILITY: PHYSICAL HEALTH: ON AVERAGE, HOW MANY DAYS PER WEEK DO YOU ENGAGE IN MODERATE TO STRENUOUS EXERCISE (LIKE A BRISK WALK)?: 0 DAYS

## 2024-11-04 ASSESSMENT — PATIENT HEALTH QUESTIONNAIRE - PHQ9
10. IF YOU CHECKED OFF ANY PROBLEMS, HOW DIFFICULT HAVE THESE PROBLEMS MADE IT FOR YOU TO DO YOUR WORK, TAKE CARE OF THINGS AT HOME, OR GET ALONG WITH OTHER PEOPLE: VERY DIFFICULT
SUM OF ALL RESPONSES TO PHQ QUESTIONS 1-9: 14
SUM OF ALL RESPONSES TO PHQ QUESTIONS 1-9: 14

## 2024-11-04 ASSESSMENT — SOCIAL DETERMINANTS OF HEALTH (SDOH): HOW OFTEN DO YOU GET TOGETHER WITH FRIENDS OR RELATIVES?: TWICE A WEEK

## 2024-11-05 ENCOUNTER — OFFICE VISIT (OUTPATIENT)
Dept: FAMILY MEDICINE | Facility: CLINIC | Age: 39
End: 2024-11-05
Attending: NURSE PRACTITIONER
Payer: COMMERCIAL

## 2024-11-05 VITALS
OXYGEN SATURATION: 95 % | TEMPERATURE: 98.6 F | HEART RATE: 81 BPM | SYSTOLIC BLOOD PRESSURE: 132 MMHG | BODY MASS INDEX: 45.99 KG/M2 | WEIGHT: 293 LBS | RESPIRATION RATE: 16 BRPM | HEIGHT: 67 IN | DIASTOLIC BLOOD PRESSURE: 85 MMHG

## 2024-11-05 DIAGNOSIS — Z00.00 ROUTINE GENERAL MEDICAL EXAMINATION AT A HEALTH CARE FACILITY: Primary | ICD-10-CM

## 2024-11-05 DIAGNOSIS — Z13.29 SCREENING FOR THYROID DISORDER: ICD-10-CM

## 2024-11-05 DIAGNOSIS — Z11.3 SCREEN FOR STD (SEXUALLY TRANSMITTED DISEASE): ICD-10-CM

## 2024-11-05 DIAGNOSIS — Z13.0 SCREENING FOR DISORDER OF BLOOD AND BLOOD-FORMING ORGANS: ICD-10-CM

## 2024-11-05 DIAGNOSIS — Z13.1 SCREENING FOR DIABETES MELLITUS (DM): ICD-10-CM

## 2024-11-05 DIAGNOSIS — Z23 HIGH PRIORITY FOR 2019-NCOV VACCINE: ICD-10-CM

## 2024-11-05 DIAGNOSIS — Z13.6 CARDIOVASCULAR SCREENING; LDL GOAL LESS THAN 160: ICD-10-CM

## 2024-11-05 LAB
ALBUMIN SERPL BCG-MCNC: 4.6 G/DL (ref 3.5–5.2)
ALP SERPL-CCNC: 103 U/L (ref 40–150)
ALT SERPL W P-5'-P-CCNC: 23 U/L (ref 0–50)
ANION GAP SERPL CALCULATED.3IONS-SCNC: 13 MMOL/L (ref 7–15)
AST SERPL W P-5'-P-CCNC: 24 U/L (ref 0–45)
BILIRUB SERPL-MCNC: 0.5 MG/DL
BUN SERPL-MCNC: 10.5 MG/DL (ref 6–20)
CALCIUM SERPL-MCNC: 9.7 MG/DL (ref 8.8–10.4)
CHLORIDE SERPL-SCNC: 102 MMOL/L (ref 98–107)
CHOLEST SERPL-MCNC: 175 MG/DL
CREAT SERPL-MCNC: 0.67 MG/DL (ref 0.51–0.95)
EGFRCR SERPLBLD CKD-EPI 2021: >90 ML/MIN/1.73M2
ERYTHROCYTE [DISTWIDTH] IN BLOOD BY AUTOMATED COUNT: 13.4 % (ref 10–15)
EST. AVERAGE GLUCOSE BLD GHB EST-MCNC: 111 MG/DL
FASTING STATUS PATIENT QL REPORTED: YES
FASTING STATUS PATIENT QL REPORTED: YES
GLUCOSE SERPL-MCNC: 100 MG/DL (ref 70–99)
HBA1C MFR BLD: 5.5 % (ref 0–5.6)
HCO3 SERPL-SCNC: 25 MMOL/L (ref 22–29)
HCT VFR BLD AUTO: 44.6 % (ref 35–47)
HCV AB SERPL QL IA: NONREACTIVE
HDLC SERPL-MCNC: 55 MG/DL
HGB BLD-MCNC: 14.8 G/DL (ref 11.7–15.7)
HIV 1+2 AB+HIV1 P24 AG SERPL QL IA: NONREACTIVE
LDLC SERPL CALC-MCNC: 105 MG/DL
MCH RBC QN AUTO: 29.5 PG (ref 26.5–33)
MCHC RBC AUTO-ENTMCNC: 33.2 G/DL (ref 31.5–36.5)
MCV RBC AUTO: 89 FL (ref 78–100)
NONHDLC SERPL-MCNC: 120 MG/DL
PLATELET # BLD AUTO: 310 10E3/UL (ref 150–450)
POTASSIUM SERPL-SCNC: 4.1 MMOL/L (ref 3.4–5.3)
PROT SERPL-MCNC: 7.6 G/DL (ref 6.4–8.3)
RBC # BLD AUTO: 5.01 10E6/UL (ref 3.8–5.2)
SODIUM SERPL-SCNC: 140 MMOL/L (ref 135–145)
T PALLIDUM AB SER QL: NONREACTIVE
TRIGL SERPL-MCNC: 75 MG/DL
TSH SERPL DL<=0.005 MIU/L-ACNC: 3.75 UIU/ML (ref 0.3–4.2)
WBC # BLD AUTO: 7.9 10E3/UL (ref 4–11)

## 2024-11-05 PROCEDURE — 87389 HIV-1 AG W/HIV-1&-2 AB AG IA: CPT | Performed by: NURSE PRACTITIONER

## 2024-11-05 PROCEDURE — 84443 ASSAY THYROID STIM HORMONE: CPT | Performed by: NURSE PRACTITIONER

## 2024-11-05 PROCEDURE — 90480 ADMN SARSCOV2 VAC 1/ONLY CMP: CPT | Performed by: NURSE PRACTITIONER

## 2024-11-05 PROCEDURE — 85027 COMPLETE CBC AUTOMATED: CPT | Performed by: NURSE PRACTITIONER

## 2024-11-05 PROCEDURE — 83036 HEMOGLOBIN GLYCOSYLATED A1C: CPT | Performed by: NURSE PRACTITIONER

## 2024-11-05 PROCEDURE — 91320 SARSCV2 VAC 30MCG TRS-SUC IM: CPT | Performed by: NURSE PRACTITIONER

## 2024-11-05 PROCEDURE — 80061 LIPID PANEL: CPT | Performed by: NURSE PRACTITIONER

## 2024-11-05 PROCEDURE — 86803 HEPATITIS C AB TEST: CPT | Performed by: NURSE PRACTITIONER

## 2024-11-05 PROCEDURE — 80053 COMPREHEN METABOLIC PANEL: CPT | Performed by: NURSE PRACTITIONER

## 2024-11-05 PROCEDURE — 99395 PREV VISIT EST AGE 18-39: CPT | Mod: 25 | Performed by: NURSE PRACTITIONER

## 2024-11-05 PROCEDURE — 86780 TREPONEMA PALLIDUM: CPT | Performed by: NURSE PRACTITIONER

## 2024-11-05 PROCEDURE — 36415 COLL VENOUS BLD VENIPUNCTURE: CPT | Performed by: NURSE PRACTITIONER

## 2024-11-05 ASSESSMENT — PAIN SCALES - GENERAL: PAINLEVEL_OUTOF10: NO PAIN (0)

## 2024-11-05 NOTE — RESULT ENCOUNTER NOTE
Pranay So,    Attached are your test results.  -Normal red blood cell (hgb) levels, normal white blood cell count and normal platelet levels.  -A1C (diabetic test) is normal and indicates that your blood sugar has been in a normal range the last 3 months.   Please contact us if you have any questions.    Selina Lancaster, CNP

## 2024-11-05 NOTE — PATIENT INSTRUCTIONS
PLAN:   1.   Symptomatic therapy suggested: Increase calcium to 1000mg and 1000iu Vit D .  Continue current medications as prescribed.    Consider start on zepbound through [facility]     2.  Orders Placed This Encounter   Procedures    REVIEW OF HEALTH MAINTENANCE PROTOCOL ORDERS    COVID-19 12+ (PFIZER)    Lipid panel reflex to direct LDL Fasting    CBC with platelets    Comprehensive metabolic panel    TSH with free T4 reflex    HIV Antigen Antibody Combo Cascade    Hepatitis C Screen Reflex to HCV RNA Quant and Genotype    Treponema Abs w Reflex to RPR and Titer    Hemoglobin A1c    Chlamydia trachomatis PCR    Neisseria gonorrhoeae PCR       3. Will follow up and/or notify patient of  results via My Chart to determine further need for followup   Follow up office visit in one year for annual health maintenance exam, sooner PRN.   Patient needs to follow up in if no improvement,or sooner if worsening of symptoms or other symptoms develop.         Patient Education   Preventive Care Advice   This is general advice given by our system to help you stay healthy. However, your care team may have specific advice just for you. Please talk to your care team about your preventive care needs.  Nutrition  Eat 5 or more servings of fruits and vegetables each day.  Try wheat bread, brown rice and whole grain pasta (instead of white bread, rice, and pasta).  Get enough calcium and vitamin D. Check the label on foods and aim for 100% of the RDA (recommended daily allowance).  Lifestyle  Exercise at least 150 minutes each week  (30 minutes a day, 5 days a week).  Do muscle strengthening activities 2 days a week. These help control your weight and prevent disease.  No smoking.  Wear sunscreen to prevent skin cancer.  Have a dental exam and cleaning every 6 months.  Yearly exams  See your health care team every year to talk about:  Any changes in your health.  Any medicines your care team has prescribed.  Preventive care, family  planning, and ways to prevent chronic diseases.  Shots (vaccines)   HPV shots (up to age 26), if you've never had them before.  Hepatitis B shots (up to age 59), if you've never had them before.  COVID-19 shot: Get this shot when it's due.  Flu shot: Get a flu shot every year.  Tetanus shot: Get a tetanus shot every 10 years.  Pneumococcal, hepatitis A, and RSV shots: Ask your care team if you need these based on your risk.  Shingles shot (for age 50 and up)  General health tests  Diabetes screening:  Starting at age 35, Get screened for diabetes at least every 3 years.  If you are younger than age 35, ask your care team if you should be screened for diabetes.  Cholesterol test: At age 39, start having a cholesterol test every 5 years, or more often if advised.  Bone density scan (DEXA): At age 50, ask your care team if you should have this scan for osteoporosis (brittle bones).  Hepatitis C: Get tested at least once in your life.  STIs (sexually transmitted infections)  Before age 24: Ask your care team if you should be screened for STIs.  After age 24: Get screened for STIs if you're at risk. You are at risk for STIs (including HIV) if:  You are sexually active with more than one person.  You don't use condoms every time.  You or a partner was diagnosed with a sexually transmitted infection.  If you are at risk for HIV, ask about PrEP medicine to prevent HIV.  Get tested for HIV at least once in your life, whether you are at risk for HIV or not.  Cancer screening tests  Cervical cancer screening: If you have a cervix, begin getting regular cervical cancer screening tests starting at age 21.  Breast cancer scan (mammogram): If you've ever had breasts, begin having regular mammograms starting at age 40. This is a scan to check for breast cancer.  Colon cancer screening: It is important to start screening for colon cancer at age 45.  Have a colonoscopy test every 10 years (or more often if you're at risk) Or, ask  your provider about stool tests like a FIT test every year or Cologuard test every 3 years.  To learn more about your testing options, visit:   .  For help making a decision, visit:   https://bit.ly/cq32410.  Prostate cancer screening test: If you have a prostate, ask your care team if a prostate cancer screening test (PSA) at age 55 is right for you.  Lung cancer screening: If you are a current or former smoker ages 50 to 80, ask your care team if ongoing lung cancer screenings are right for you.  For informational purposes only. Not to replace the advice of your health care provider. Copyright   2023 [employer]. All rights reserved. Clinically reviewed by the  [employer] [place] [facility]. [employer] [id number] - REV 01/24.  Learning About Stress  What is stress?     Stress is your body's response to a hard situation. Your body can have a physical, emotional, or mental response. Stress is a fact of life for most people, and it affects everyone differently. What causes stress for you may not be stressful for someone else.  A lot of things can cause stress. You may feel stress when you go on a job interview, take a test, or run a race. This kind of short-term stress is normal and even useful. It can help you if you need to work hard or react quickly. For example, stress can help you finish an important job on time.  Long-term stress is caused by ongoing stressful situations or events. Examples of long-term stress include long-term health problems, ongoing problems at work, or conflicts in your family. Long-term stress can harm your health.  How does stress affect your health?  When you are stressed, your body responds as though you are in danger. It makes hormones that speed up your heart, make you breathe faster, and give you a burst of energy. This is called the fight-or-flight stress response. If the stress is over quickly, your body goes back to normal and no harm is done.  But if stress  happens too often or lasts too long, it can have bad effects. Long-term stress can make you more likely to get sick, and it can make symptoms of some diseases worse. If you tense up when you are stressed, you may develop neck, shoulder, or low back pain. Stress is linked to high blood pressure and heart disease.  Stress also harms your emotional health. It can make you tinoco, tense, or depressed. Your relationships may suffer, and you may not do well at work or school.  What can you do to manage stress?  You can try these things to help manage stress:   Do something active. Exercise or activity can help reduce stress. Walking is a great way to get started. Even everyday activities such as housecleaning or yard work can help.  Try yoga or shashank chi. These techniques combine exercise and meditation. You may need some training at first to learn them.  Do something you enjoy. For example, listen to music or go to a movie. Practice your hobby or do volunteer work.  Meditate. This can help you relax, because you are not worrying about what happened before or what may happen in the future.  Do guided imagery. Imagine yourself in any setting that helps you feel calm. You can use online videos, books, or a teacher to guide you.  Do breathing exercises. For example:  From a standing position, bend forward from the waist with your knees slightly bent. Let your arms dangle close to the floor.  Breathe in slowly and deeply as you return to a standing position. Roll up slowly and lift your head last.  Hold your breath for just a few seconds in the standing position.  Breathe out slowly and bend forward from the waist.  Let your feelings out. Talk, laugh, cry, and express anger when you need to. Talking with supportive friends or family, a counselor, or a walter leader about your feelings is a healthy way to relieve stress. Avoid discussing your feelings with people who make you feel worse.  Write. It may help to write about things  "that are bothering you. This helps you find out how much stress you feel and what is causing it. When you know this, you can find better ways to cope.  What can you do to prevent stress?  You might try some of these things to help prevent stress:  Manage your time. This helps you find time to do the things you want and need to do.  Get enough sleep. Your body recovers from the stresses of the day while you are sleeping.  Get support. Your family, friends, and community can make a difference in how you experience stress.  Limit your news feed. Avoid or limit time on social media or news that may make you feel stressed.  Do something active. Exercise or activity can help reduce stress. Walking is a great way to get started.  Where can you learn more?  Go to https://www.[employer].net/patiented  Enter N032 in the search box to learn more about \"Learning About Stress.\"  Current as of: October 24, 2023  Content Version: 14.2 2024 [employer].   Care instructions adapted under license by your healthcare professional. If you have questions about a medical condition or this instruction, always ask your healthcare professional. [employer] disclaims any warranty or liability for your use of this information.    Learning About Depression Screening  What is depression screening?  Depression screening is a way to see if you have depression symptoms. It may be done by a doctor or counselor. It's often part of a routine checkup. That's because your mental health is just as important as your physical health.  Depression is a mental health condition that affects how you feel, think, and act. You may:  Have less energy.  Lose interest in your daily activities.  Feel sad and grouchy for a long time.  Depression is very common. It affects people of all ages.  Many things can lead to depression. Some people become depressed after they have a stroke or find out they have a major illness like cancer or heart " "disease. The death of a loved one or a breakup may lead to depression. It can run in families. Most experts believe that a combination of inherited genes and stressful life events can cause it.  What happens during screening?  You may be asked to fill out a form about your depression symptoms. You and the doctor will discuss your answers. The doctor may ask you more questions to learn more about how you think, act, and feel.  What happens after screening?  If you have symptoms of depression, your doctor will talk to you about your options.  Doctors usually treat depression with medicines or counseling. Often, combining the two works best. Many people don't get help because they think that they'll get over the depression on their own. But people with depression may not get better unless they get treatment.  The cause of depression is not well understood. There may be many factors involved. But if you have depression, it's not your fault.  A serious symptom of depression is thinking about death or suicide. If you or someone you care about talks about this or about feeling hopeless, get help right away.  It's important to know that depression can be treated. Medicine, counseling, and self-care may help.  Where can you learn more?  Go to https://www.[employer].net/patiented  Enter T185 in the search box to learn more about \"Learning About Depression Screening.\"  Current as of: June 24, 2023  Content Version: 14.2 2024 [facility] [employer].   Care instructions adapted under license by your healthcare professional. If you have questions about a medical condition or this instruction, always ask your healthcare professional. Healthwise[employer] Incorporated disclaims any warranty or liability for your use of this information.    Safer Sex: Care Instructions  Overview  Safer sex is a way to reduce your risk of getting a sexually transmitted infection (STI). It can also help prevent pregnancy.  Several products can help you " practice safer sex and reduce your chance of STIs. One of the best is a condom. There are internal and external condoms. You can use a special rubber sheet (dental dam) for protection during oral sex. Disposable gloves can keep your hands from touching blood, semen, or other body fluids that can carry infections.  Remember that birth control methods such as diaphragms, IUDs, foams, and birth control pills do not stop you from getting STIs.  Follow-up care is a key part of your treatment and safety. Be sure to make and go to all appointments, and call your doctor if you are having problems. It's also a good idea to know your test results and keep a list of the medicines you take.  How can you care for yourself at home?  Think about getting vaccinated to help prevent hepatitis A, hepatitis B, and human papillomavirus (HPV). They can be spread through sex.  Use a condom every time you have sex. Use an external condom, which goes on the penis. Or use an internal condom, which goes into the vagina or anus.  Make sure you use the right size external condom. A condom that's too small can break easily. A condom that's too big can slip off during sex.  Use a new condom each time you have sex. Be careful not to poke a hole in the condom when you open the wrapper.  Don't use an internal condom and an external condom at the same time.  Never use petroleum jelly (such as Vaseline), grease, hand lotion, baby oil, or anything with oil in it. These products can make holes in the condom.  After intercourse, hold the edge of the condom as you remove it. This will help keep semen from spilling out of the condom.  Do not have sex with anyone who has symptoms of an STI, such as sores on the genitals or mouth.  Do not drink a lot of alcohol or use drugs before sex.  Limit your sex partners. Sex with one partner who has sex only with you can reduce your risk of getting an STI.  Don't share sex toys. But if you do share them, use a condom  "and clean the sex toys between each use.  Talk to any partners before you have sex. Talk about what you feel comfortable with and whether you have any boundaries with sex. And find out if your partner or partners may be at risk for any STI. Keep in mind that a person may be able to spread an STI even if they do not have symptoms. You and any partners may want to get tested for STIs.  Where can you learn more?  Go to https://www.[employer].net/patiented  Enter B608 in the search box to learn more about \"Safer Sex: Care Instructions.\"  Current as of: November 27, 2023  Content Version: 14.2 2024 [employer].   Care instructions adapted under license by your healthcare professional. If you have questions about a medical condition or this instruction, always ask your healthcare professional. Healthwise[employer] Incorporated disclaims any warranty or liability for your use of this information.       "

## 2024-11-05 NOTE — PROGRESS NOTES
Prior to immunization administration, verified patients identity using patient s name and date of birth. Please see Immunization Activity for additional information.     Screening Questionnaire for Adult Immunization    Are you sick today?   No   Do you have allergies to medications, food, a vaccine component or latex? Adhesive Tape    Yes   Have you ever had a serious reaction after receiving a vaccination?   No   Do you have a long-term health problem with heart, lung, kidney, or metabolic disease (e.g., diabetes), asthma, a blood disorder, no spleen, complement component deficiency, a cochlear implant, or a spinal fluid leak?  Are you on long-term aspirin therapy?   No   Do you have cancer, leukemia, HIV/AIDS, or any other immune system problem?   No   Do you have a parent, brother, or sister with an immune system problem?   No   In the past 3 months, have you taken medications that affect  your immune system, such as prednisone, other steroids, or anticancer drugs; drugs for the treatment of rheumatoid arthritis, Crohn s disease, or psoriasis; or have you had radiation treatments?   No   Have you had a seizure, or a brain or other nervous system problem?   No   During the past year, have you received a transfusion of blood or blood    products, or been given immune (gamma) globulin or antiviral drug?   No   For women: Are you pregnant or is there a chance you could become       pregnant during the next month?   No   Have you received any vaccinations in the past 4 weeks?   No     Immunization questionnaire answers were all negative.      Patient instructed to remain in clinic for 15 minutes afterwards, and to report any adverse reactions.     Screening performed by Latesha Crandall MA on 11/5/2024 at 7:39 AM.

## 2024-11-05 NOTE — PROGRESS NOTES
"Preventive Care Visit  [facility]  Selina MATT Good CNP, Family Medicine  Nov 5, 2024      Assessment & Plan     Routine general medical examination at a health care facility  - PRIMARY CARE FOLLOW-UP SCHEDULING  - REVIEW OF HEALTH MAINTENANCE PROTOCOL ORDERS    CARDIOVASCULAR SCREENING; LDL GOAL LESS THAN 160  - Lipid panel reflex to direct LDL Fasting    Screening for thyroid disorder  - TSH with free T4 reflex    Screening for disorder of blood and blood-forming organs  - CBC with platelets    Screening for diabetes mellitus (DM)  - Comprehensive metabolic panel  - Hemoglobin A1c    Screen for STD (sexually transmitted disease)  - Treponema Abs w Reflex to RPR and Titer  - HIV Antigen Antibody Combo Cascade  - Hepatitis C Screen Reflex to HCV RNA Quant and Genotype  - Treponema Abs w Reflex to RPR and Titer  - Chlamydia trachomatis/Neisseria gonorrhoeae by PCR    High priority for 2019-nCoV vaccine  Covid vaccine       Patient has been advised of split billing requirements and indicates understanding: Yes        BMI  Estimated body mass index is 49.4 kg/m  as calculated from the following:    Height as of this encounter: 1.689 m (5' 6.5\").    Weight as of this encounter: 140.9 kg (310 lb 11.2 oz).   Weight management plan: Discussed healthy diet and exercise guidelines    Counseling  Appropriate preventive services were addressed with this patient via screening, questionnaire, or discussion as appropriate for fall prevention, nutrition, physical activity, Tobacco-use cessation, social engagement, weight loss and cognition.  Checklist reviewing preventive services available has been given to the patient.  Reviewed patient's diet, addressing concerns and/or questions.   The patient's PHQ-9 score is consistent with moderate depression. She was provided with information regarding depression.       FUTURE APPOINTMENTS:       - Follow-up for annual visit or as needed  See Patient " Tres Daly is a 39 year old, presenting for the following:  Physical        11/5/2024     6:53 AM   Additional Questions   Roomed by Brandy WALKER   Accompanied by self          Healthy Habits:     Getting at least 3 servings of Calcium per day:  Yes    Bi-annual eye exam:  NO    Dental care twice a year:  Yes    Sleep apnea or symptoms of sleep apnea:  None    Diet:  Regular (no restrictions)    Frequency of exercise:  None    Duration of exercise:  N/A    Taking medications regularly:  Yes    Barriers to taking medications:  None    Medication side effects:  None    Additional concerns today:  No    Health Care Directive  Patient has a Health Care Directive on file  Advance care planning document is on file and is current.      11/4/2024   General Health   How would you rate your overall physical health? Good   Feel stress (tense, anxious, or unable to sleep) Very much      (!) STRESS CONCERN      11/4/2024   Nutrition   Three or more servings of calcium each day? Yes   Diet: Regular (no restrictions)   How many servings of fruit and vegetables per day? (!) 0-1   How many sweetened beverages each day? 0-1            11/4/2024   Exercise   Days per week of moderate/strenous exercise 0 days   Average minutes spent exercising at this level 0 min      (!) EXERCISE CONCERN      11/4/2024   Social Factors   Frequency of gathering with friends or relatives Twice a week   Worry food won't last until get money to buy more No   Food not last or not have enough money for food? Yes   Do you have housing? (Housing is defined as stable permanent housing and does not include staying ouside in a car, in a tent, in an abandoned building, in an overnight shelter, or couch-surfing.) Yes   Are you worried about losing your housing? Yes   Lack of transportation? No   Unable to get utilities (heat,electricity)? No   Want help with housing or utility concern? No      (!) FOOD SECURITY CONCERN PRESENT(!) HOUSING CONCERN  PRESENT      2024   Dental   Dentist two times every year? Yes            2024   TB Screening   Were you born outside of the [place]? No          Today's PHQ-9 Score:       2024     7:09 PM   PHQ-9 SCORE   PHQ-9 Total Score MyChart 14 (Moderate depression)   PHQ-9 Total Score 14        Patient-reported         2024   Substance Use   Alcohol more than 3/day or more than 7/wk No   Do you use any other substances recreationally? No        Social History     Tobacco Use    Smoking status: Former     Current packs/day: 0.00     Types: Cigarettes, Dip, chew, snus or snuff     Start date: 1/10/2010     Quit date: 2013     Years since quittin.8    Smokeless tobacco: Former     Quit date: 2013   Vaping Use    Vaping status: Never Used   Substance Use Topics    Alcohol use: Yes     Alcohol/week: 0.8 standard drinks of alcohol    Drug use: No          Mammogram Screening - Patient under 40 years of age: Routine Mammogram Screening not recommended.         2024   STI Screening   New sexual partner(s) since last STI/HIV test? (!) YES         History of abnormal Pap smear: No - age 30- 64 PAP with HPV every 5 years recommended        Latest Ref Rng & Units 2021     1:38 PM 2016     6:03 PM 2016    12:00 AM   PAP / HPV   PAP  Negative for Intraepithelial Lesion or Malignancy (NILM)      PAP (Historical)    NIL    HPV 16 DNA Negative Negative  Negative     HPV 18 DNA Negative Negative  Negative     Other HR HPV Negative Negative  Negative             2024   Contraception/Family Planning   Questions about contraception or family planning No           Reviewed and updated as needed this visit by Provider      Problems               Past Medical History:   Diagnosis Date    Depressive disorder     GERD without esophagitis     Hypertension     prior to weight loss    Latent tuberculosis     neg chest x-ray. treated with INH for 9 moniths    Mild major depression (H)     celexa  100 mg     Morbid obesity (H)     Urinary incontinence      Past Surgical History:   Procedure Laterality Date    C EACH ADD TOOTH EXTRACTION      multiple tooth extractions     SECTION N/A 2016    Procedure:  SECTION;  Surgeon: Kelli Osullivan MD;  Location: [facility]    CHOLECYSTECTOMY, LAPOROSCOPIC      LAP ADJUSTABLE GASTRIC BAND      [facility] lost over 150 lb     LAPAROSCOPIC SALPINGECTOMY Bilateral 2018    Procedure: LAPAROSCOPIC SALPINGECTOMY;  Bilateral Laparoscopic Salpingectomy ;  Surgeon: Celeste Mai MD;  Location: [facility]      TONSILLECTOMY       Lab work is in process  Labs reviewed in EPIC  BP Readings from Last 3 Encounters:   24 132/85   10/24/23 127/83   23 126/80    Wt Readings from Last 3 Encounters:   24 140.9 kg (310 lb 11.2 oz)   10/24/23 140.6 kg (310 lb)   23 138.9 kg (306 lb 3.2 oz)                  Patient Active Problem List   Diagnosis    Mild major depression (H)    Anxiety    Non-specific low back pain    Morbid obesity (H)    Bariatric surgery status    GERD without esophagitis    mirena IUD 2021    De Quervain's tenosynovitis, right    Moderate episode of recurrent major depressive disorder (H)     Past Surgical History:   Procedure Laterality Date    C EACH ADD TOOTH EXTRACTION      multiple tooth extractions     SECTION N/A 2016    Procedure:  SECTION;  Surgeon: Kelli Osullivan MD;  Location: [facility]    CHOLECYSTECTOMY, LAPOROSCOPIC      LAP ADJUSTABLE GASTRIC BAND      [facility] lost over 150 lb     LAPAROSCOPIC SALPINGECTOMY Bilateral 2018    Procedure: LAPAROSCOPIC SALPINGECTOMY;  Bilateral Laparoscopic Salpingectomy ;  Surgeon: Celeste Mai MD;  Location: [facility]      TONSILLECTOMY         Social History     Tobacco Use    Smoking status: Former     Current packs/day: 0.00     Types: Cigarettes, Dip, chew, snus or snuff     Start date:  1/10/2010     Quit date: 2013     Years since quittin.8    Smokeless tobacco: Former     Quit date: 2013   Substance Use Topics    Alcohol use: Yes     Alcohol/week: 0.8 standard drinks of alcohol     Family History   Problem Relation Age of Onset    C.A.D. Maternal Grandfather     Diabetes Maternal Grandfather     Hypertension Maternal Grandfather     Obesity Maternal Grandfather     Hyperlipidemia Maternal Grandfather     Coronary Artery Disease Maternal Grandfather     Colon Polyps Maternal Grandfather     Heart Failure Paternal Grandmother         CHF    Anemia Paternal Grandmother     Glaucoma Father         corneal transplant. congenital glaucoma    Obesity Maternal Grandmother     Other Cancer Maternal Grandmother         cirrosis of liver. non-drinker    Obesity Other         Had GI surgery    Hypertension Other         mother's side of family    Arthritis Other         both sides of family    Hypertension Other     Obesity Other          Current Outpatient Medications   Medication Sig Dispense Refill    clonazePAM (KLONOPIN) 2 MG tablet Take 1 tablet (2 mg) by mouth 2 times daily as needed for anxiety - use sparingly 60 tablet 0    levonorgestrel (MIRENA) 20 MCG/24HR IUD 1 each by Intrauterine route once      sertraline (ZOLOFT) 100 MG tablet Take 2 tablets (200 mg) by mouth daily 60 tablet 1    traZODone (DESYREL) 100 MG tablet       tirzepatide-weight management (TIRZEPATIDE) 2.5 MG/0.5ML SOLN Inject 2.5 mg subcutaneously every 7 days. 2 mL 0    tirzepatide-Weight Management (ZEPBOUND) 2.5 MG/0.5ML prefilled pen Inject 0.5 mLs (2.5 mg) subcutaneously every 7 days. 2 mL 0     Allergies   Allergen Reactions    Adhesive Tape      blisters    Chlorhexidine      Pruritic rash    Fenugreek [Trigonella Foenum-Graecum] Hives    Ragweeds Hives    Colophony  [Pinus Strobus] Blisters, Dermatitis, Hives, Itching and Rash       CONSTITUTIONAL:POSITIVE  for weight gain and did have surgery in  NEGATIVE  " for anorexia and sweats  INTEGUMENTARY/SKIN: NEGATIVE for worrisome rashes, moles or lesions  EYES: NEGATIVE for vision changes or irritation  ENT: NEGATIVE for ear, mouth and throat problems  RESP:NEGATIVE for significant cough or SOB  BREAST: NEGATIVE for masses, tenderness or discharge  CV: NEGATIVE for chest pain, palpitations or peripheral edema  GI: NEGATIVE for nausea, abdominal pain, heartburn, or change in bowel habits   female: no unusual urinary symptoms, no unusual vaginal symptoms, and menses: has IUD occasional dark old blood   MUSCULOSKELETAL:POSITIVE  for joint pain left knee torn meniscus  and NEGATIVE for joint swelling  and joint warmth   NEURO: NEGATIVE for weakness, dizziness or paresthesias  ENDOCRINE: POSITIVE  for hair loss and NEGATIVE for polydipsia, polyphagia, and polyuria  HEME/ALLERGY/IMMUNE: POSITIVE  for allergies and NEGATIVE for night sweats and swollen nodes  PSYCHIATRIC: POSITIVE forHx anxiety and Hx depression and sees a therapist and sees [facility]  NEGATIVE forthoughts of hurting someone else and thoughts of self harm            Objective    Exam  /85 (BP Location: Right arm, Patient Position: Sitting, Cuff Size: Adult Large)   Pulse 81   Temp 98.6  F (37  C) (Oral)   Resp 16   Ht 1.689 m (5' 6.5\")   Wt 140.9 kg (310 lb 11.2 oz)   LMP  (LMP Unknown)   SpO2 95%   BMI 49.40 kg/m     Estimated body mass index is 49.4 kg/m  as calculated from the following:    Height as of this encounter: 1.689 m (5' 6.5\").    Weight as of this encounter: 140.9 kg (310 lb 11.2 oz).    Physical Exam  GENERAL: alert and no distress  EYES: Eyes grossly normal to inspection and conjunctivae and sclerae normal  HENT: ear canals and TM's normal, nose and mouth without ulcers or lesions  NECK: no adenopathy, no asymmetry, masses, or scars  RESP: lungs clear to auscultation - no rales, rhonchi or wheezes  BREAST: normal without masses, tenderness or nipple discharge and no " palpable axillary masses or adenopathy  CV: regular rates and rhythm, no murmur, click or rub, peripheral pulses strong, and no peripheral edema  ABDOMEN: soft, nontender, no hepatosplenomegaly, no masses and bowel sounds normal  MS: no gross musculoskeletal defects noted, no edema  SKIN: no suspicious lesions or rashes  NEURO: Normal strength and tone, mentation intact and speech normal  PSYCH: mentation appears normal, affect normal/bright  LYMPH: no cervical, supraclavicular, axillary, or inguinal adenopathy        Signed Electronically by: MATT Og CNP    Answers submitted by the patient for this visit:  Patient Health Questionnaire (Submitted on 11/4/2024)  If you checked off any problems, how difficult have these problems made it for you to do your work, take care of things at home, or get along with other people?: Very difficult  PHQ9 TOTAL SCORE: 14

## 2024-11-06 NOTE — RESULT ENCOUNTER NOTE
Pranay So,    Attached are your test results.  -LDL(bad) cholesterol level is elevated which can increase your heart disease risk.  A diet high in fat and simple carbohydrates, genetics and being overweight can contribute to this. ADVISE: exercising 150 minutes of aerobic exercise per week (30 minutes for 5 days per week or 50 minutes for 3 days per week are options) and eating a low saturated fat/low carbohydrate diet are helpful to improve this. In 12 months, you should recheck your fasting cholesterol panel by scheduling a lab-only appointment.  -Liver and gallbladder tests are normal (ALT,AST, Alk phos, bilirubin), kidney function is normal (Cr, GFR), sodium is normal, potassium is normal, calcium is normal, glucose is normal.  -TSH (thyroid stimulating hormone) level is normal which indicates normal thyroid function.  -Hepatitis C antibody screen test shows no signs of a previous hepatitis C infection.  -HIV test is normal.   Please contact us if you have any questions.    Selina Lancaster, CNP

## 2024-11-11 ENCOUNTER — MYC MEDICAL ADVICE (OUTPATIENT)
Dept: FAMILY MEDICINE | Facility: CLINIC | Age: 39
End: 2024-11-11
Payer: COMMERCIAL

## 2024-11-11 DIAGNOSIS — E66.01 MORBID OBESITY (H): Primary | ICD-10-CM

## 2024-11-12 ENCOUNTER — TELEPHONE (OUTPATIENT)
Dept: FAMILY MEDICINE | Facility: CLINIC | Age: 39
End: 2024-11-12

## 2024-11-12 RX ORDER — TIRZEPATIDE 2.5 MG/.5ML
2.5 INJECTION, SOLUTION SUBCUTANEOUS
Qty: 2 ML | Refills: 0 | Status: SHIPPED | OUTPATIENT
Start: 2024-11-12

## 2024-12-04 DIAGNOSIS — E66.01 MORBID OBESITY (H): ICD-10-CM

## 2024-12-30 ENCOUNTER — MYC MEDICAL ADVICE (OUTPATIENT)
Dept: FAMILY MEDICINE | Facility: CLINIC | Age: 39
End: 2024-12-30
Payer: COMMERCIAL

## 2024-12-30 DIAGNOSIS — E66.01 MORBID OBESITY (H): ICD-10-CM

## 2024-12-31 NOTE — TELEPHONE ENCOUNTER
Routing to provider to review and advise on sending 7.5mg Zepbound to pharmacy.    Callum Lara RN  [facility]

## 2025-01-07 RX ORDER — TIRZEPATIDE 5 MG/.5ML
5 INJECTION, SOLUTION SUBCUTANEOUS WEEKLY
Qty: 2 ML | Refills: 3 | Status: SHIPPED | OUTPATIENT
Start: 2025-01-07

## 2025-01-07 NOTE — TELEPHONE ENCOUNTER
Called [facility] pharmacy and spoke to Maddie, pharmacy staff regarding Zepbound prescription sent in today.     Maddie states prescription was rejected because it was sent in the form of auto-injector and would need provider to sent it in vial form instead.     Routing to provider to review and resend prescription.     GALINDO Acevedo  [facility]

## 2025-06-27 NOTE — RESULT ENCOUNTER NOTE
Pranay So,    Attached are your test results.  Hand xray is normal    Please contact us if you have any questions.    Selina Lancaster, CNP     no 1

## (undated) DEVICE — LINEN GOWN X4 5410

## (undated) DEVICE — STRAP KNEE/BODY 31143004

## (undated) DEVICE — DRAPE LAVH/LAPAROSCOPY W/POUCH 29474

## (undated) DEVICE — COVER CAMERA IN-LIGHT DISP LT-C02

## (undated) DEVICE — TUBING INSUFFLATION W/FILTER 10FT GS1016

## (undated) DEVICE — ENDO TROCAR 05MM VERSASTEP VS101005

## (undated) DEVICE — Device

## (undated) DEVICE — CATH TRAY FOLEY SURESTEP 16FR WDRAIN BAG STLK LATEX A300316A

## (undated) DEVICE — NDL INSUFFLATION 14GA STEP S100000

## (undated) DEVICE — GLOVE PROTEXIS W/NEU-THERA 6.5  2D73TE65

## (undated) DEVICE — NDL INSUFFLATION 14GA 150MM VS150000

## (undated) DEVICE — SOL WATER IRRIG 1000ML BOTTLE 2F7114

## (undated) DEVICE — SU MONOCRYL 4-0 PS-2 18" UND Y496G

## (undated) DEVICE — GLOVE PROTEXIS BLUE W/NEU-THERA 7.0  2D73EB70

## (undated) DEVICE — PAD CHUX UNDERPAD 30X30"

## (undated) DEVICE — JELLY LUBRICATING SURGILUBE 2OZ TUBE

## (undated) DEVICE — SU DERMABOND ADVANCED .7ML DNX12

## (undated) DEVICE — LINEN TOWEL PACK X5 5464

## (undated) DEVICE — ENDO TROCAR 12MM VERSASTEP VS101012P

## (undated) DEVICE — ESU LIGASURE LAPAROSCOPIC BLUNT TIP SEALER 5MMX37CM LF1837

## (undated) DEVICE — SOL NACL 0.9% IRRIG 1000ML BOTTLE 2F7124

## (undated) DEVICE — ENDO TROCAR 05MM LONG VERSASTEP EXP SLEEVE VS101505

## (undated) RX ORDER — PROPOFOL 10 MG/ML
INJECTION, EMULSION INTRAVENOUS
Status: DISPENSED
Start: 2018-05-09

## (undated) RX ORDER — OXYCODONE AND ACETAMINOPHEN 5; 325 MG/1; MG/1
TABLET ORAL
Status: DISPENSED
Start: 2018-05-09

## (undated) RX ORDER — FENTANYL CITRATE 50 UG/ML
INJECTION, SOLUTION INTRAMUSCULAR; INTRAVENOUS
Status: DISPENSED
Start: 2018-05-09

## (undated) RX ORDER — DEXAMETHASONE SODIUM PHOSPHATE 4 MG/ML
INJECTION, SOLUTION INTRA-ARTICULAR; INTRALESIONAL; INTRAMUSCULAR; INTRAVENOUS; SOFT TISSUE
Status: DISPENSED
Start: 2018-05-09

## (undated) RX ORDER — EPHEDRINE SULFATE 50 MG/ML
INJECTION, SOLUTION INTRAMUSCULAR; INTRAVENOUS; SUBCUTANEOUS
Status: DISPENSED
Start: 2018-05-09

## (undated) RX ORDER — KETOROLAC TROMETHAMINE 30 MG/ML
INJECTION, SOLUTION INTRAMUSCULAR; INTRAVENOUS
Status: DISPENSED
Start: 2018-05-09

## (undated) RX ORDER — ONDANSETRON 2 MG/ML
INJECTION INTRAMUSCULAR; INTRAVENOUS
Status: DISPENSED
Start: 2018-05-09

## (undated) RX ORDER — PHENYLEPHRINE HCL IN 0.9% NACL 1 MG/10 ML
SYRINGE (ML) INTRAVENOUS
Status: DISPENSED
Start: 2018-05-09